# Patient Record
Sex: FEMALE | Race: WHITE | NOT HISPANIC OR LATINO | Employment: FULL TIME | URBAN - METROPOLITAN AREA
[De-identification: names, ages, dates, MRNs, and addresses within clinical notes are randomized per-mention and may not be internally consistent; named-entity substitution may affect disease eponyms.]

---

## 2020-12-15 NOTE — PROGRESS NOTES
SUBJECTIVE:   CC: Andres Trivedi is an 35 year old woman who presents for preventive health visit.       Patient has been advised of split billing requirements and indicates understanding: Yes  Healthy Habits:    Do you get at least three servings of calcium containing foods daily (dairy, green leafy vegetables, etc.)? yes    Amount of exercise or daily activities, outside of work: none    Problems taking medications regularly No    Medication side effects: No    Have you had an eye exam in the past two years? yes    Do you see a dentist twice per year? yes    Do you have sleep apnea, excessive snoring or daytime drowsiness?no      No concerns    Today's PHQ-2 Score: No flowsheet data found.    Abuse: Current or Past(Physical, Sexual or Emotional)- No  Do you feel safe in your environment? Yes        Social History     Tobacco Use     Smoking status: Never Smoker     Tobacco comment: no smokers in the household   Substance Use Topics     Alcohol use: Not on file     If you drink alcohol do you typically have >3 drinks per day or >7 drinks per week? No                     Reviewed orders with patient.  Reviewed health maintenance and updated orders accordingly - Yes  Lab work is in process    Mammogram not appropriate for this patient based on age.    Pertinent mammograms are reviewed under the imaging tab.  History of abnormal Pap smear: NO - age 30-65 PAP every 5 years with negative HPV co-testing recommended     Reviewed and updated as needed this visit by clinical staff                 Reviewed and updated as needed this visit by Provider                    ROS:  CONSTITUTIONAL: NEGATIVE for fever, chills, change in weight  INTEGUMENTARU/SKIN: NEGATIVE for worrisome rashes, moles or lesions  EYES: NEGATIVE for vision changes or irritation  ENT: NEGATIVE for ear, mouth and throat problems  RESP: NEGATIVE for significant cough or SOB  BREAST: NEGATIVE for masses, tenderness or discharge  CV: NEGATIVE for  "chest pain, palpitations or peripheral edema  GI: NEGATIVE for nausea, abdominal pain, heartburn, or change in bowel habits  : NEGATIVE for unusual urinary or vaginal symptoms. Periods are regular.  MUSCULOSKELETAL: NEGATIVE for significant arthralgias or myalgia  NEURO: NEGATIVE for weakness, dizziness or paresthesias  ENDOCRINE: NEGATIVE for temperature intolerance, skin/hair changes  HEME/ALLERGY/IMMUNE: NEGATIVE for bleeding problems  PSYCHIATRIC: NEGATIVE for changes in mood or affect    OBJECTIVE:   /82 (BP Location: Right arm, Patient Position: Sitting, Cuff Size: Adult Large)   Pulse 99   Ht 1.575 m (5' 2\")   Wt 87.7 kg (193 lb 4 oz)   LMP 12/03/2020 (Exact Date)   BMI 35.35 kg/m    EXAM:  GENERAL: healthy, alert and no distress  EYES: Eyes grossly normal to inspection, PERRL and conjunctivae and sclerae normal  HENT: ear canals and TM's normal, nose and mouth without ulcers or lesions  NECK: no adenopathy, no asymmetry, masses, or scars and thyroid normal to palpation  RESP: lungs clear to auscultation - no rales, rhonchi or wheezes  BREAST: normal without masses, tenderness or nipple discharge and no palpable axillary masses or adenopathy  CV: regular rate and rhythm, normal S1 S2, no S3 or S4, no murmur, click or rub, no peripheral edema and peripheral pulses strong  ABDOMEN: soft, nontender, no hepatosplenomegaly, no masses and bowel sounds normal   (female): normal female external genitalia, normal urethral meatus, vaginal mucosa pink, moist, well rugated, and normal cervixwithout masses or discharge  MS: no gross musculoskeletal defects noted, no edema  SKIN: no suspicious lesions or rashes  NEURO: Normal strength and tone, mentation intact and speech normal  PSYCH: mentation appears normal, affect normal/bright    Diagnostic Test Results:  Labs reviewed in Epic  No results found for this or any previous visit (from the past 24 hour(s)).    ASSESSMENT/PLAN:   (Z01.419) Encounter for " "gynecological examination without abnormal finding  (primary encounter diagnosis)  Comment:   Plan: HIV Antigen Antibody Combo, Hepatitis C         antibody            (Z68.35) BMI 35.0-35.9,adult  Comment:   Plan: COMPREHENSIVE WEIGHT MANAGEMENT        Weight fluctuates.  Has done medifast in the past with mixed results.   Feels that COVID has added to her issues with weight    (Z11.3) Routine screening for STI (sexually transmitted infection)  Comment:   Plan: Chlamydia trachomatis PCR, Neisseria         gonorrhoeae PCR, HIV Antigen Antibody Combo,         Hepatitis C antibody            (Z12.4) Screening for malignant neoplasm of cervix  Comment:   Plan: Pap imaged thin layer screen with HPV -         recommended age 30 - 65 years (select HPV order        below), HPV High Risk Types DNA Cervical                COUNSELING:   Reviewed preventive health counseling, as reflected in patient instructions       Regular exercise       Healthy diet/nutrition       Contraception       Family planning    Estimated body mass index is 35.35 kg/m  as calculated from the following:    Height as of this encounter: 1.575 m (5' 2\").    Weight as of this encounter: 87.7 kg (193 lb 4 oz).    Weight management plan: Patient referred to endocrine and/or weight management specialty    She reports that she has never smoked. She does not have any smokeless tobacco history on file.      Counseling Resources:  ATP IV Guidelines  Pooled Cohorts Equation Calculator  Breast Cancer Risk Calculator  BRCA-Related Cancer Risk Assessment: FHS-7 Tool  FRAX Risk Assessment  ICSI Preventive Guidelines  Dietary Guidelines for Americans, 2010  USDA's MyPlate  ASA Prophylaxis  Lung CA Screening    [facility] [place]  "

## 2020-12-18 ENCOUNTER — OFFICE VISIT (OUTPATIENT)
Dept: OBGYN | Facility: CLINIC | Age: 35
End: 2020-12-18
Payer: COMMERCIAL

## 2020-12-18 VITALS
DIASTOLIC BLOOD PRESSURE: 82 MMHG | WEIGHT: 193.25 LBS | HEART RATE: 99 BPM | SYSTOLIC BLOOD PRESSURE: 129 MMHG | HEIGHT: 62 IN | BODY MASS INDEX: 35.56 KG/M2

## 2020-12-18 DIAGNOSIS — Z12.4 SCREENING FOR MALIGNANT NEOPLASM OF CERVIX: ICD-10-CM

## 2020-12-18 DIAGNOSIS — Z11.3 ROUTINE SCREENING FOR STI (SEXUALLY TRANSMITTED INFECTION): ICD-10-CM

## 2020-12-18 DIAGNOSIS — Z01.419 ENCOUNTER FOR GYNECOLOGICAL EXAMINATION WITHOUT ABNORMAL FINDING: Primary | ICD-10-CM

## 2020-12-18 PROCEDURE — 86803 HEPATITIS C AB TEST: CPT | Performed by: ADVANCED PRACTICE MIDWIFE

## 2020-12-18 PROCEDURE — 87389 HIV-1 AG W/HIV-1&-2 AB AG IA: CPT | Performed by: ADVANCED PRACTICE MIDWIFE

## 2020-12-18 PROCEDURE — 87624 HPV HI-RISK TYP POOLED RSLT: CPT | Performed by: ADVANCED PRACTICE MIDWIFE

## 2020-12-18 PROCEDURE — 87491 CHLMYD TRACH DNA AMP PROBE: CPT | Performed by: ADVANCED PRACTICE MIDWIFE

## 2020-12-18 PROCEDURE — 99385 PREV VISIT NEW AGE 18-39: CPT | Performed by: ADVANCED PRACTICE MIDWIFE

## 2020-12-18 PROCEDURE — G0145 SCR C/V CYTO,THINLAYER,RESCR: HCPCS | Performed by: ADVANCED PRACTICE MIDWIFE

## 2020-12-18 PROCEDURE — 36415 COLL VENOUS BLD VENIPUNCTURE: CPT | Performed by: ADVANCED PRACTICE MIDWIFE

## 2020-12-18 PROCEDURE — 87591 N.GONORRHOEAE DNA AMP PROB: CPT | Performed by: ADVANCED PRACTICE MIDWIFE

## 2020-12-18 ASSESSMENT — MIFFLIN-ST. JEOR: SCORE: 1534.83

## 2020-12-20 LAB
C TRACH DNA SPEC QL NAA+PROBE: NEGATIVE
HCV AB SERPL QL IA: NONREACTIVE
HIV 1+2 AB+HIV1 P24 AG SERPL QL IA: NONREACTIVE
N GONORRHOEA DNA SPEC QL NAA+PROBE: NEGATIVE
SPECIMEN SOURCE: NORMAL
SPECIMEN SOURCE: NORMAL

## 2020-12-23 LAB
COPATH REPORT: NORMAL
PAP: NORMAL

## 2020-12-24 LAB
FINAL DIAGNOSIS: NORMAL
HPV HR 12 DNA CVX QL NAA+PROBE: NEGATIVE
HPV16 DNA SPEC QL NAA+PROBE: NEGATIVE
HPV18 DNA SPEC QL NAA+PROBE: NEGATIVE
SPECIMEN DESCRIPTION: NORMAL
SPECIMEN SOURCE CVX/VAG CYTO: NORMAL

## 2021-01-15 ENCOUNTER — HEALTH MAINTENANCE LETTER (OUTPATIENT)
Age: 36
End: 2021-01-15

## 2021-04-14 ENCOUNTER — OFFICE VISIT (OUTPATIENT)
Dept: DERMATOLOGY | Facility: CLINIC | Age: 36
End: 2021-04-14
Payer: COMMERCIAL

## 2021-04-14 DIAGNOSIS — L91.8 SKIN TAG: Primary | ICD-10-CM

## 2021-04-14 DIAGNOSIS — D18.01 CHERRY ANGIOMA: ICD-10-CM

## 2021-04-14 DIAGNOSIS — D22.9 BENIGN NEVUS OF SKIN: ICD-10-CM

## 2021-04-14 PROCEDURE — 11200 RMVL SKIN TAGS UP TO&INC 15: CPT | Performed by: PHYSICIAN ASSISTANT

## 2021-04-14 PROCEDURE — 99203 OFFICE O/P NEW LOW 30 MIN: CPT | Mod: 25 | Performed by: PHYSICIAN ASSISTANT

## 2021-04-14 NOTE — PATIENT INSTRUCTIONS
Cryotherapy    What is it?    Use of a very cold liquid, such as liquid nitrogen, to freeze and destroy abnormal skin cells that need to be removed    What should I expect?    Tenderness and redness    A small blister that might grow and fill with dark purple blood. There may be crusting.    More than one treatment may be needed if the lesions do not go away.    How do I care for the treated area?    Gently wash the area with your hands when bathing.    Use a thin layer of Vaseline to help with healing. You may use a Band-Aid.     The area should heal within 7-10 days and may leave behind a pink or lighter color.     Do not use an antibiotic or Neosporin ointment.     You may take acetaminophen (Tylenol) for pain.     Call your Doctor if you have:    Severe pain    Signs of infection (warmth, redness, cloudy yellow drainage, and or a bad smell)    Questions or concerns    Who should I call with questions?       [facility]: [phone]       [facility]: [phone]       For urgent needs outside of business hours call the [facility] at [phone]        and ask for the dermatology resident on call

## 2021-04-14 NOTE — LETTER
4/14/2021         RE: Andres Trivedi  [address] [facility] [address]        Dear Colleague,    Thank you for referring your patient, Andres Trivedi, to the [facility]. Please see a copy of my visit note below.    [facility] Dermatology Note  Encounter Date: Apr 14, 2021  Office Visit     Dermatology Problem List:  1. Inflamed skin tag -s/p cryo 4/14/21    Family history: Negative for skin cancer.  Social hx: uses tanning beds  ____________________________________________    Assessment & Plan:  # Cherry angioma(s).    - No further intervention needed.    # Multiple clinically benign nevi on the extremities.    - No further intervention needed.   - ABCD's of melanoma were reviewed with patient and handout provided.   - Sunscreen: Apply 20 minutes prior to going outdoors and reapply every two hours, when wet or sweating. We recommend using an SPF 30 or higher, and to use one that is water resistant.   - Strongly discouraged tanning bed use      # Inflamed skin tag, L neck x1  - See procedure note.    Procedures Performed:   - Cryotherapy procedure note, location(s): right shoulder. After verbal consent and discussion of risks and benefits including, but not limited to, dyspigmentation/scar, blister, and pain, 1 lesion(s) was(were) treated with 1-2 mm freeze border for 1-2 cycles with liquid nitrogen. Post cryotherapy instructions were provided.    Follow-up: prn for new or changing lesions    Staff and Scribe:     Scribe Disclosure:   I, Jing Benson, am serving as a scribe to document services personally performed by Shyanne Worthy PA-C, based on data collection and the provider's statements to me.    Provider Disclosure:   The documentation recorded by the scribe accurately reflects the services I personally performed and the decisions made by me.    All risks, benefits and alternatives were discussed with patient.  Patient is in agreement and  understands the assessment and plan.  All questions were answered.    Shyanne Worthy PA-C, MPAS  [facility] [place]: Phone: [phone], Fax: [phone]  [facility]: Phone: [phone],  Fax: [phone]  ____________________________________________    CC: Derm Problem (Spot check on left neck, left shoulder, right knee. Patient has never seen dermatology previously. No known family history of skin cancer.)    HPI:  Ms. Andres Trivedi is a(n) 36 year old female who presents today as a new patient for spot check.    Self-referred.     Today, notes concerns on the left neck, left shoulder, and right knee. The lesion on right medial leg has appeared in the last 2 years or so. Patient has been tanning.    Patient is otherwise feeling well, without additional concerns.    Labs:  NA    Physical Exam:  Vitals: There were no vitals taken for this visit.  SKIN: Focused examination of upper and lower extremities and back was performed.  - 7mm flesh colored papule with some brown pigment centrally on the left shoulder.  - On the right medial knee there is a 4 mm pinkish brown papule  - There are dome shaped bright red papules on the lower extremity.   - Multiple regular brown pigmented macules and papules are identified on the trunk and extremities.    - There is(are) skin colored pedunculated papules on the left side of the neck.   - Brothers's skin type I-II  - No other lesions of concern on areas examined.     Medications:  No current outpatient medications on file.     No current facility-administered medications for this visit.       Past Medical History:   Patient Active Problem List   Diagnosis     BMI 35.0-35.9,adult     Past Medical History:   Diagnosis Date     Depression        CC Dr. Pendleton on close of this encounter.      Again, thank you for allowing me to participate in the care of your patient.         Sincerely,        Shyanne Worthy PA-C

## 2021-04-14 NOTE — NURSING NOTE
Andres Trivedi's goals for this visit include:   Chief Complaint   Patient presents with     Derm Problem     Spot check on left neck, left shoulder, right knee. Patient has never seen dermatology previously. No known family history of skin cancer.       She requests these members of her care team be copied on today's visit information:     PCP: No Ref-Primary, Physician    Referring Provider:  No referring provider defined for this encounter.    There were no vitals taken for this visit.    Do you need any medication refills at today's visit? Roxanne Guerrero [facility]

## 2021-05-27 ENCOUNTER — OFFICE VISIT (OUTPATIENT)
Dept: OTOLARYNGOLOGY | Facility: CLINIC | Age: 36
End: 2021-05-27
Payer: COMMERCIAL

## 2021-05-27 DIAGNOSIS — J02.0 STREPTOCOCCAL SORE THROAT: Primary | ICD-10-CM

## 2021-05-27 LAB
DEPRECATED S PYO AG THROAT QL EIA: NEGATIVE
SPECIMEN SOURCE: NORMAL
SPECIMEN SOURCE: NORMAL
STREP GROUP A PCR: NOT DETECTED

## 2021-05-27 PROCEDURE — 99202 OFFICE O/P NEW SF 15 MIN: CPT | Performed by: OTOLARYNGOLOGY

## 2021-05-27 PROCEDURE — 99N1174 PR STATISTIC STREP A RAPID: Performed by: OTOLARYNGOLOGY

## 2021-05-27 PROCEDURE — 87651 STREP A DNA AMP PROBE: CPT | Performed by: INTERNAL MEDICINE

## 2021-05-27 NOTE — PROGRESS NOTES
Andres Trivedi presents today with sore throat since Monday night that has gotten better since yesterday. She has been taking ibuprofen. No fevers. Rapid COVID test yesterday. Fully vaccinated for COVID. Has some secretions in the morning in her throat. No rhinorrhea. Has malaise. Never had mono.     No PMH or PSH.     On exam: NAD, no stridor, voice strong. Has full mouth opening. Right submandibular tenderness. Full neck ROM. Has right pharyngeal fullness. This seems behind the tonsil and not isolated to just isolated to the anterior pillar. Uvula is midline.     Medical Decision Making: Pharyngitis. Will obtain step test. If positive will start antibiotics. If negative, will consider viral. Rapid COVID negative yesterday. I would like to reexamine after resolution of symptoms to ensure the pharyngeal fullness resolves. If you don't get better, seek care with one of the ENT partners in this practice or go to urgent care/ED. Rajinder Shetty MD

## 2021-05-27 NOTE — LETTER
5/27/2021         RE: Andres Trivedi  [address] [address]        Dear Colleague,    Thank you for referring your patient, Andres Trivedi, to the [facility]. Please see a copy of my visit note below.    Andres rTivedi presents today with sore throat since Monday night that has gotten better since yesterday. She has been taking ibuprofen. No fevers. Rapid COVID test yesterday. Fully vaccinated for COVID. Has some secretions in the morning in her throat. No rhinorrhea. Has malaise. Never had mono.     No PMH or PSH.     On exam: NAD, no stridor, voice strong. Has full mouth opening. Right submandibular tenderness. Full neck ROM. Has right pharyngeal fullness. This seems behind the tonsil and not isolated to just isolated to the anterior pillar. Uvula is midline.     Medical Decision Making: Pharyngitis. Will obtain step test. If positive will start antibiotics. If negative, will consider viral. Rapid COVID negative yesterday. I would like to reexamine after resolution of symptoms to ensure the pharyngeal fullness resolves. If you don't get better, seek care with one of the ENT partners in this practice or go to urgent care/ED. Rajinder Shetty MD            Again, thank you for allowing me to participate in the care of your patient.        Sincerely,        Rajinder Shetty MD

## 2021-09-25 ENCOUNTER — HEALTH MAINTENANCE LETTER (OUTPATIENT)
Age: 36
End: 2021-09-25

## 2022-03-12 ENCOUNTER — HEALTH MAINTENANCE LETTER (OUTPATIENT)
Age: 37
End: 2022-03-12

## 2022-08-16 ENCOUNTER — OFFICE VISIT (OUTPATIENT)
Dept: OBGYN | Facility: CLINIC | Age: 37
End: 2022-08-16
Payer: COMMERCIAL

## 2022-08-16 VITALS
BODY MASS INDEX: 39.91 KG/M2 | WEIGHT: 218.19 LBS | DIASTOLIC BLOOD PRESSURE: 82 MMHG | SYSTOLIC BLOOD PRESSURE: 124 MMHG | HEART RATE: 93 BPM

## 2022-08-16 DIAGNOSIS — Z30.430 ENCOUNTER FOR INSERTION OF INTRAUTERINE CONTRACEPTIVE DEVICE: Primary | ICD-10-CM

## 2022-08-16 DIAGNOSIS — Z97.5 IUD (INTRAUTERINE DEVICE) IN PLACE: ICD-10-CM

## 2022-08-16 PROCEDURE — 58300 INSERT INTRAUTERINE DEVICE: CPT | Performed by: ADVANCED PRACTICE MIDWIFE

## 2022-08-16 NOTE — PROGRESS NOTES
CC/HPI: Andres Trivedi is a 37 year old who presents to the clinic for an IUD insertion.   Patient's last menstrual period was 07/31/2022 (exact date)..  Current birth control method: condom and abstinence  Indication for insertion:   birth control    Patient has been provided with written information.  I have reviewed the risks of the IUD including pregnancy, PID, life threatening infection, perforation, expulsion, cramping, changes in bleeding and ovarian cysts. Benefits of the IUD and alternative birth control and cycle control methods have been discussed.  Patients questions have been answered.  Patient has verbalized understanding of risks and benefits and has signed the consent form.    No Known Allergies  Current Outpatient Medications   Medication Sig Dispense Refill     levonorgestrel (MIRENA) 20 MCG/DAY IUD 1 each (20 mcg) by Intrauterine route once        Past Medical History:   Diagnosis Date     Depression      Family History   Problem Relation Age of Onset     No Known Problems Mother      Hyperlipidemia Father      Hypertension Father      No Known Problems Sister      No Known Problems Maternal Grandmother      No Known Problems Maternal Grandfather      Diabetes Paternal Grandmother      Cardiovascular Paternal Grandmother      Autoimmune Disease Sister      Diabetes Paternal Grandfather      Cardiovascular Paternal Grandfather      Social History     Socioeconomic History     Marital status: Single     Spouse name: Not on file     Number of children: Not on file     Years of education: Not on file     Highest education level: Not on file   Occupational History     Not on file   Tobacco Use     Smoking status: Never Smoker     Smokeless tobacco: Never Used     Tobacco comment: no smokers in the household   Vaping Use     Vaping Use: Never used   Substance and Sexual Activity     Alcohol use: Yes     Comment: occ.     Drug use: Never     Sexual activity: Yes     Partners: Male     Birth  control/protection: Condom   Other Topics Concern     Not on file   Social History Narrative     Not on file     Social Determinants of Health     Financial Resource Strain: Not on file   Food Insecurity: Not on file   Transportation Needs: Not on file   Physical Activity: Not on file   Stress: Not on file   Social Connections: Not on file   Intimate Partner Violence: Not on file   Housing Stability: Not on file     Past Surgical History:   Procedure Laterality Date     NO HISTORY OF SURGERY           EXAM:  /82 (BP Location: Right arm, Patient Position: Sitting, Cuff Size: Adult Large)   Pulse 93   Wt 99 kg (218 lb 3 oz)   LMP 07/31/2022 (Exact Date)   BMI 39.91 kg/m    PELVIC EXAM:  Vulva: No external lesions, normal hair distribution, no adenopathy, BUS WNL  Vagina: Moist, pink, no abnormal discharge, well rugated, no lesions  Cervix: smooth, pink, no visible lesions, neg CMT   Uterus: Normal size, Anteverted , non-tender, mobile  Ovaries: No mass, non-tender, mobile  Rectal exam: deferred    IUD type: Mirena  Lot # [id number]  NDC# 36401-428-10 Mirena    EXP DATE:   9/2024        Procedure:  Uterus assessed for position and is Anteverted.  Speculum inserted.  Betadine prep of cervix done.  Tenaculum applied at  10/2  o'clock position and gentle traction was appiled to elongate the cervical canal.  Uterus sounded to 7 cm's.    IUD inserted in the usual fashion according to manufacture's instructions, without significant resistance, severe protracted pain or excessive bleeding. The tenaculum was removed with scant bleeding from the puncture sites   Strings trimmed to 3 cm's.  Patient  tolerated the procedure well without any prolonged pain or syncopy.    ASSESSMENT/ PLAN:  (Z30.430) Encounter for insertion of intrauterine contraceptive device  (primary encounter diagnosis)  Comment:   Plan: levonorgestrel (MIRENA) 20 MCG/DAY IUD,         levonorgestrel (MIRENA) 20 MCG/DAY IUD 20 mcg,         INSERTION  INTRAUTERINE DEVICE            (Z97.5) IUD (intrauterine device) in place  Comment:   Plan:         Instructions given to patient regarding checking IUD strings, returning to the clinic if pain, fever, unusual vaginal discharge or inability to check strings and/or irregular bleeding and avoiding placing anything in her vagina for the next 24 hours.  BUM of birth control indicated  recommended for 7 days  Return to the clinic in 4-6 weeks for IUD follow up   See AVS for complete instructions

## 2022-08-16 NOTE — PATIENT INSTRUCTIONS
What Mirena Users May Expect    What to watch for right after Mirena is placed  Some women may experience uterine cramps, bleeding, and/or dizziness during and right after Mirena is placed. To help minimize the cramps, you may taken ibuprofen 600 mg with food prior to and every 6 hours after your appointment if needed. These symptoms should improve over the next 24 hours.  Mild cramping may be present for a few days after your placement  As a follow up, you should visit your clinic once in the first 4 to 12 weeks after Mirena is placed to make sure it is in the right position. After that, Mirena can be checked once a year as part of your routine exam.    Please use a back-up method (abstinence or condoms) for 7 days after placement. Unless instructed differently at your appointment    Your periods may change  For the first 3 to 6 months, your monthly period may become irregular. You may also have frequent spotting or light bleeding. A few women have heavy bleeding during this time. After your body adjusts, the number of bleeding days is likely to decrease (but may remain irregular), and you may even find that your periods stop altogether for as long as Mirena is in place. Around the end of the third month of use, you may see up to a 75% reduction in the amount of menstrual bleeding. By one year, about 1 out of 5 users may hay have no period at all. At the end of two years, 70% have little or no bleeding. Your periods will return rapidly once Mirena is removed.     Mirena Strings  You may check your own Mirena strings by inserting a finger into the vagina and feeling the strings as they exit the cervix.  The strings will initially feel firm, like fishing line, but will soften over a few weeks.  After the strings have softened, you or your partner should not be able to feel the strings during intercourse. If your partner continues to feel the strings you can have them shortened by your provider If you can feel the  IUD, see your healthcare provider to have the position confirmed.  You may use tampons with Mirena in place.    Mirena does not protect against HIV or STDs.  Mirena does not prevent the formation of ovarian cysts.  Mirena does not typically reduce acne or cause weight gain or mood changes.    Call the clinic immediately if you:  Notice any change in the length of the strings or can feel part of the IUD  Have pain or bleeding with sex.  Have unusually heavy bleeding from the vagina.   Think you are pregnant  Have been or might have been exposed to a sexually transmitted infection  Have unusual pelvic pain, cramping or soreness in your abdomen  Have unexplained fever or chills.       Please call [facility] [phone]  if you have questions or concerns.    For more information:  [email or web address]

## 2022-12-06 ENCOUNTER — E-VISIT (OUTPATIENT)
Dept: URGENT CARE | Facility: CLINIC | Age: 37
End: 2022-12-06
Payer: COMMERCIAL

## 2022-12-06 DIAGNOSIS — J06.9 VIRAL URI WITH COUGH: Primary | ICD-10-CM

## 2022-12-06 PROCEDURE — 99421 OL DIG E/M SVC 5-10 MIN: CPT | Performed by: NURSE PRACTITIONER

## 2022-12-07 ENCOUNTER — LAB (OUTPATIENT)
Dept: LAB | Facility: CLINIC | Age: 37
End: 2022-12-07
Payer: COMMERCIAL

## 2022-12-07 DIAGNOSIS — J06.9 VIRAL URI WITH COUGH: ICD-10-CM

## 2022-12-07 LAB
FLUAV AG SPEC QL IA: POSITIVE
FLUBV AG SPEC QL IA: NEGATIVE

## 2022-12-07 PROCEDURE — 87804 INFLUENZA ASSAY W/OPTIC: CPT

## 2022-12-07 NOTE — PATIENT INSTRUCTIONS
Dear Andres,      Based on your responses, you may have influenza.     Will I be tested for COVID-19?  We would like to test you for influenza.     To schedule: go to your [employer] home page and scroll down to the section that says  You have an appointment that needs to be scheduled  and click the large green button that says  Schedule Now  and follow the steps to find the next available openings.    If you are unable to complete these [employer] scheduling steps, please call [phone] to schedule your testing.     How can I take care of myself?  Over the counter medications may help with your symptoms such as runny or stuffy nose, cough, chills, or fever.  Talk to your care team about your options.

## 2023-02-20 ENCOUNTER — TELEPHONE (OUTPATIENT)
Dept: FAMILY MEDICINE | Facility: CLINIC | Age: 38
End: 2023-02-20
Payer: COMMERCIAL

## 2023-02-20 NOTE — TELEPHONE ENCOUNTER
Reason for Call:  appointment    Detailed comments: patient has an appointment this thursday to discuss food allergies and family history of diabetes - she is worried about the weather and wondering if she should change that to virtual or just reschedule for another day    Routing to provider to review and advise     [facility]       Call taken on 2/20/2023 at 3:11 PM by Reba Pedraza

## 2023-02-23 ENCOUNTER — VIRTUAL VISIT (OUTPATIENT)
Dept: FAMILY MEDICINE | Facility: CLINIC | Age: 38
End: 2023-02-23
Payer: COMMERCIAL

## 2023-02-23 DIAGNOSIS — Z13.220 LIPID SCREENING: ICD-10-CM

## 2023-02-23 DIAGNOSIS — Z13.21 ENCOUNTER FOR VITAMIN DEFICIENCY SCREENING: ICD-10-CM

## 2023-02-23 DIAGNOSIS — Z91.018 FOOD ALLERGY: Primary | ICD-10-CM

## 2023-02-23 DIAGNOSIS — Z13.1 DIABETES MELLITUS SCREENING: ICD-10-CM

## 2023-02-23 DIAGNOSIS — R68.2 DRY MOUTH: ICD-10-CM

## 2023-02-23 PROCEDURE — 99203 OFFICE O/P NEW LOW 30 MIN: CPT | Mod: VID | Performed by: INTERNAL MEDICINE

## 2023-02-23 NOTE — PROGRESS NOTES
Andres is a 37 year old who is being evaluated via a billable video visit.      How would you like to obtain your AVS? MyChart  If the video visit is dropped, the invitation should be resent by: Send to e-mail at: [email or web address]  Will anyone else be joining your video visit? No        Assessment & Plan   Andres was seen today for allergies.    Diagnoses and all orders for this visit:    Food allergy  -     Allergen shrimp IgE; Future  -     Allergen crab IgE; Future  -     Allergen salmon IgE; Future  -     Allergen tuna IgE; Future    Dry mouth  -     Anti Nuclear Mansi IgG by IFA with Reflex; Future  -     SSA Ro DAIMON Antibody IgG; Future  -     SSB La DAMION Antibody IgG; Future  -     CBC with platelets; Future  -     Basic metabolic panel  (Ca, Cl, CO2, Creat, Gluc, K, Na, BUN); Future  -     TSH with free T4 reflex; Future    Lipid screening  -     Lipid panel reflex to direct LDL Fasting; Future    Diabetes mellitus screening  -     Glucose; Future    Encounter for vitamin deficiency screening  -     Vitamin D Deficiency; Future    Other orders  -     REVIEW OF HEALTH MAINTENANCE PROTOCOL ORDERS       Rule out food allergy, rule out Sjogren's.  If laboratory testings unremarkable, refer to ENT for further evaluation.    Return if symptoms worsen or fail to improve.    Rosaura Ambrocio MD PhD  [facility]   Andres is a 37 year old, presenting for the following health issues:  Allergies      History of Present Illness       Reason for visit:  Dry mouth concerns / eatablish PCP  Symptom onset:  More than a month  Symptoms include:  Mouth/Lips/Gums feel dry, tongue feels dry/ like it was burnt on something hot  Symptom intensity:  Moderate  Symptom progression:  Staying the same  Had these symptoms before:  No  What makes it worse:  It flucuates, unsure why.  What makes it better:  Sleeping w/ humidifier helps my lips not feel as dry.    She eats 2-3 servings of fruits and  vegetables daily.She consumes 0 sweetened beverage(s) daily.She exercises with enough effort to increase her heart rate 9 or less minutes per day.  She exercises with enough effort to increase her heart rate 3 or less days per week.   She is taking medications regularly.     At first she thought it was food allergies. It came on suddenly after eating sushi (soft shell crab, tuna, salmon, may be shrimp tempura, other raw fish, not sure the name), noted her tongue tingly, the next day, everything she eat made her tongue felt burning. She avoided seafood and nuts. Then the symptoms persisted, lipds feeling lip and dry. The tongue tingling, feels like she burnt it almost, the front half of the tongue but not the back. No difficulty swallowing.    Skin not ore dry than normal. No dry eyes.    Sister with hives outbreaks, no cause found.     No family history of autoimmune disease. Mom and maternal grandmother developed allergy to raw shrimp later in life, able to eat cooked shrimp.     Otherwise healthy, has an IUD, placed in august 2022.     Review of Systems   Constitutional, HEENT, cardiovascular, pulmonary, gi and gu systems are negative, except as otherwise noted.      Objective           Vitals:  No vitals were obtained today due to virtual visit.    Physical Exam   GENERAL: Healthy, alert and no distress  EYES: Eyes grossly normal to inspection.  No discharge or erythema, or obvious scleral/conjunctival abnormalities.  RESP: No audible wheeze, cough, or visible cyanosis.  No visible retractions or increased work of breathing.    SKIN: Visible skin clear. No significant rash, abnormal pigmentation or lesions.  NEURO: Cranial nerves grossly intact.  Mentation and speech appropriate for age.  PSYCH: Mentation appears normal, affect normal/bright, judgement and insight intact, normal speech and appearance well-groomed.        Video-Visit Details    Type of service:  Video Visit     Originating Location (pt. Location):  Home  Distant Location (provider location):  On-site  Platform used for Video Visit: Laney

## 2023-02-24 ENCOUNTER — LAB (OUTPATIENT)
Dept: LAB | Facility: CLINIC | Age: 38
End: 2023-02-24
Payer: COMMERCIAL

## 2023-02-24 DIAGNOSIS — Z91.018 FOOD ALLERGY: ICD-10-CM

## 2023-02-24 DIAGNOSIS — R68.2 DRY MOUTH: ICD-10-CM

## 2023-02-24 DIAGNOSIS — Z13.21 ENCOUNTER FOR VITAMIN DEFICIENCY SCREENING: ICD-10-CM

## 2023-02-24 DIAGNOSIS — Z13.220 LIPID SCREENING: ICD-10-CM

## 2023-02-24 DIAGNOSIS — Z13.1 DIABETES MELLITUS SCREENING: ICD-10-CM

## 2023-02-24 LAB
ANION GAP SERPL CALCULATED.3IONS-SCNC: 5 MMOL/L (ref 3–14)
BUN SERPL-MCNC: 10 MG/DL (ref 7–30)
CALCIUM SERPL-MCNC: 8.6 MG/DL (ref 8.5–10.1)
CHLORIDE BLD-SCNC: 110 MMOL/L (ref 94–109)
CHOLEST SERPL-MCNC: 180 MG/DL
CO2 SERPL-SCNC: 26 MMOL/L (ref 20–32)
CREAT SERPL-MCNC: 0.66 MG/DL (ref 0.52–1.04)
DEPRECATED CALCIDIOL+CALCIFEROL SERPL-MC: 28 UG/L (ref 20–75)
ERYTHROCYTE [DISTWIDTH] IN BLOOD BY AUTOMATED COUNT: 13 % (ref 10–15)
FASTING STATUS PATIENT QL REPORTED: YES
FASTING STATUS PATIENT QL REPORTED: YES
GFR SERPL CREATININE-BSD FRML MDRD: >90 ML/MIN/1.73M2
GLUCOSE BLD-MCNC: 92 MG/DL (ref 70–99)
GLUCOSE BLD-MCNC: 92 MG/DL (ref 70–99)
HCT VFR BLD AUTO: 40.3 % (ref 35–47)
HDLC SERPL-MCNC: 77 MG/DL
HGB BLD-MCNC: 13.1 G/DL (ref 11.7–15.7)
LDLC SERPL CALC-MCNC: 91 MG/DL
MCH RBC QN AUTO: 28.5 PG (ref 26.5–33)
MCHC RBC AUTO-ENTMCNC: 32.5 G/DL (ref 31.5–36.5)
MCV RBC AUTO: 88 FL (ref 78–100)
NONHDLC SERPL-MCNC: 103 MG/DL
PLATELET # BLD AUTO: 283 10E3/UL (ref 150–450)
POTASSIUM BLD-SCNC: 4.2 MMOL/L (ref 3.4–5.3)
RBC # BLD AUTO: 4.59 10E6/UL (ref 3.8–5.2)
SODIUM SERPL-SCNC: 141 MMOL/L (ref 133–144)
TRIGL SERPL-MCNC: 60 MG/DL
TSH SERPL DL<=0.005 MIU/L-ACNC: 1.03 MU/L (ref 0.4–4)
WBC # BLD AUTO: 8.7 10E3/UL (ref 4–11)

## 2023-02-24 PROCEDURE — 86003 ALLG SPEC IGE CRUDE XTRC EA: CPT

## 2023-02-24 PROCEDURE — 82306 VITAMIN D 25 HYDROXY: CPT

## 2023-02-24 PROCEDURE — 85027 COMPLETE CBC AUTOMATED: CPT

## 2023-02-24 PROCEDURE — 84443 ASSAY THYROID STIM HORMONE: CPT

## 2023-02-24 PROCEDURE — 80048 BASIC METABOLIC PNL TOTAL CA: CPT

## 2023-02-24 PROCEDURE — 86038 ANTINUCLEAR ANTIBODIES: CPT

## 2023-02-24 PROCEDURE — 86235 NUCLEAR ANTIGEN ANTIBODY: CPT | Mod: 59

## 2023-02-24 PROCEDURE — 36415 COLL VENOUS BLD VENIPUNCTURE: CPT

## 2023-02-24 PROCEDURE — 86235 NUCLEAR ANTIGEN ANTIBODY: CPT

## 2023-02-24 PROCEDURE — 80061 LIPID PANEL: CPT

## 2023-02-24 NOTE — RESULT ENCOUNTER NOTE
Dear Andres,   Your recent test results showed the following:  --Your labs were all normal.  Borderline chloride is not clinically significant.  --Food allergy labs and Sjogren labs are not back yet.    Please call or Mychart to our office if you have further questions.     Rosaura Ambrocio MD-PhD

## 2023-02-25 NOTE — RESULT ENCOUNTER NOTE
Dear Andres,   Your recent test results showed the following:  -- Vitamin D level is in the lower end of normal.  You can take an additional vitamin D supplement 1000 units daily to bring it into a mid to high normal range.    Please call or Mychart to our office if you have further questions.     Rosaura Ambrocio MD-PhD

## 2023-02-27 LAB
ANA SER QL IF: NEGATIVE
CRAB IGE QN: <0.1 KU(A)/L
ENA SS-A AB SER IA-ACNC: <0.5 U/ML
ENA SS-A AB SER IA-ACNC: NEGATIVE
ENA SS-B IGG SER IA-ACNC: <0.6 U/ML
ENA SS-B IGG SER IA-ACNC: NEGATIVE
SALMON IGE QN: <0.1 KU(A)/L
SHRIMP IGE QN: <0.1 KU(A)/L
TUNA IGE QN: <0.1 KU(A)/L

## 2023-03-01 NOTE — RESULT ENCOUNTER NOTE
Dear Andres,   Your recent test results showed the following:  -- no seafood or fish allergy identified.  -- Sjogren' tests were negative as well. The next step for the mouth symptoms, is to see ENT.   --  I made a referral. You can call [phone] to schedule at [facility].      Please call or Mychart to our office if you have further questions.     Rosaura Ambrocio MD-PhD

## 2023-03-17 ENCOUNTER — OFFICE VISIT (OUTPATIENT)
Dept: OTOLARYNGOLOGY | Facility: CLINIC | Age: 38
End: 2023-03-17
Payer: COMMERCIAL

## 2023-03-17 VITALS — SYSTOLIC BLOOD PRESSURE: 132 MMHG | HEART RATE: 112 BPM | DIASTOLIC BLOOD PRESSURE: 85 MMHG

## 2023-03-17 DIAGNOSIS — R43.2 TASTE IMPAIRMENT: Primary | ICD-10-CM

## 2023-03-17 PROCEDURE — 99214 OFFICE O/P EST MOD 30 MIN: CPT | Performed by: OTOLARYNGOLOGY

## 2023-03-17 ASSESSMENT — ENCOUNTER SYMPTOMS
BRUISES/BLEEDS EASILY: 0
TREMORS: 0
HEADACHES: 0
COUGH: 0
SPUTUM PRODUCTION: 0
DOUBLE VISION: 0
VOMITING: 0
HEMOPTYSIS: 0
PHOTOPHOBIA: 0
HEARTBURN: 0
TINGLING: 0
CONSTITUTIONAL NEGATIVE: 1
NAUSEA: 0
DIZZINESS: 0
BLURRED VISION: 0

## 2023-03-17 NOTE — PROGRESS NOTES
HPI     Andres is having dry mouth and taste changes for the past a couple of months. Denies any smell changes. She recently changed her toothpaste and helped a little. Denies any new medications, ear or nasal infections, trauma, food allergies, or any autoimmune diseases. No hx of thyroid diseases, diabetes, or anemia.    Review of Systems   Constitutional: Negative.    HENT: Negative.    Eyes: Negative for blurred vision, double vision and photophobia.   Respiratory: Negative for cough, hemoptysis and sputum production.    Gastrointestinal: Negative for heartburn, nausea and vomiting.   Skin: Negative.    Neurological: Negative for dizziness, tingling, tremors and headaches.   Endo/Heme/Allergies: Negative for environmental allergies. Does not bruise/bleed easily.         Physical Exam  Vitals and nursing note reviewed.   Constitutional:       Appearance: Normal appearance.   HENT:      Head: Normocephalic and atraumatic.      Right Ear: Hearing, tympanic membrane, ear canal and external ear normal. No decreased hearing noted. No middle ear effusion.      Left Ear: Tympanic membrane, ear canal and external ear normal. No decreased hearing noted.  No middle ear effusion.      Nose: Nose normal. No septal deviation, mucosal edema, congestion or rhinorrhea.      Right Turbinates: Not enlarged or swollen.      Left Turbinates: Not enlarged or swollen.      Mouth/Throat:      Mouth: Mucous membranes are dry.      Tongue: No lesions. Tongue does not deviate from midline.      Palate: No mass and lesions.      Pharynx: Oropharynx is clear. Uvula midline.   Eyes:      Extraocular Movements: Extraocular movements intact.      Pupils: Pupils are equal, round, and reactive to light.   Neurological:      Mental Status: She is alert.       A/P  Andres is having burning mouth syndrome and taste changes. Options were discussed. She will have good hydration, B12, trace elements including Mg, Zinc and be seen in the f/u as  needed.

## 2023-03-17 NOTE — NURSING NOTE
Andres Trivedi's chief complaint for this visit includes:  Chief Complaint   Patient presents with     Consult     Dry mouth and no taste on one spot on tongue. Onset in Jan, started with a burning and tingling, then dry mouth and no taste. Changed toothpaste and helped a little.      PCP: No Ref-Primary, Physician    Referring Provider:  No referring provider defined for this encounter.    /85   Pulse 112   Data Unavailable      No Known Allergies      Do you need any medication refills at today's visit?

## 2023-03-17 NOTE — PROGRESS NOTES
Chief Complaint   Patient presents with     Consult     Dry mouth and no taste on one spot on tongue. Onset in Jan, started with a burning and tingling, then dry mouth and no taste. Changed toothpaste and helped a little.

## 2023-03-17 NOTE — LETTER
3/17/2023         RE: Andres Trivedi  [id number] [address] [facility] [address]        Dear Colleague,    Thank you for referring your patient, Andres Trivedi, to the [facility]. Please see a copy of my visit note below.    Chief Complaint   Patient presents with     Consult     Dry mouth and no taste on one spot on tongue. Onset in Jan, started with a burning and tingling, then dry mouth and no taste. Changed toothpaste and helped a little.          HPI     Andres is having dry mouth and taste changes for the past a couple of months. Denies any smell changes. She recently changed her toothpaste and helped a little. Denies any new medications, ear or nasal infections, trauma, food allergies, or any autoimmune diseases. No hx of thyroid diseases, diabetes, or anemia.    Review of Systems   Constitutional: Negative.    HENT: Negative.    Eyes: Negative for blurred vision, double vision and photophobia.   Respiratory: Negative for cough, hemoptysis and sputum production.    Gastrointestinal: Negative for heartburn, nausea and vomiting.   Skin: Negative.    Neurological: Negative for dizziness, tingling, tremors and headaches.   Endo/Heme/Allergies: Negative for environmental allergies. Does not bruise/bleed easily.         Physical Exam  Vitals and nursing note reviewed.   Constitutional:       Appearance: Normal appearance.   HENT:      Head: Normocephalic and atraumatic.      Right Ear: Hearing, tympanic membrane, ear canal and external ear normal. No decreased hearing noted. No middle ear effusion.      Left Ear: Tympanic membrane, ear canal and external ear normal. No decreased hearing noted.  No middle ear effusion.      Nose: Nose normal. No septal deviation, mucosal edema, congestion or rhinorrhea.      Right Turbinates: Not enlarged or swollen.      Left Turbinates: Not enlarged or swollen.      Mouth/Throat:      Mouth: Mucous membranes are dry.      Tongue: No lesions.  Tongue does not deviate from midline.      Palate: No mass and lesions.      Pharynx: Oropharynx is clear. Uvula midline.   Eyes:      Extraocular Movements: Extraocular movements intact.      Pupils: Pupils are equal, round, and reactive to light.   Neurological:      Mental Status: She is alert.       A/P  Andres is having burning mouth syndrome and taste changes. Options were discussed. She will have good hydration, B12, trace elements including Mg, Zinc and be seen in the f/u as needed.         Again, thank you for allowing me to participate in the care of your patient.        Sincerely,        Bryanna Wolf MD

## 2023-04-22 ENCOUNTER — HEALTH MAINTENANCE LETTER (OUTPATIENT)
Age: 38
End: 2023-04-22

## 2023-05-05 ENCOUNTER — MYC MEDICAL ADVICE (OUTPATIENT)
Dept: FAMILY MEDICINE | Facility: CLINIC | Age: 38
End: 2023-05-05
Payer: COMMERCIAL

## 2023-05-05 DIAGNOSIS — U07.1 INFECTION DUE TO 2019 NOVEL CORONAVIRUS: Primary | ICD-10-CM

## 2023-12-27 ASSESSMENT — ENCOUNTER SYMPTOMS
MYALGIAS: 1
ABDOMINAL PAIN: 0
SHORTNESS OF BREATH: 0
DIARRHEA: 0
CHILLS: 0
JOINT SWELLING: 0
HEMATURIA: 0
EYE PAIN: 0
PARESTHESIAS: 0
FEVER: 0
ARTHRALGIAS: 0
SORE THROAT: 0
BREAST MASS: 0
CONSTIPATION: 0
NERVOUS/ANXIOUS: 0
WEAKNESS: 0
DYSURIA: 0
HEADACHES: 0
FREQUENCY: 0
NAUSEA: 0
COUGH: 0
PALPITATIONS: 0
HEARTBURN: 0
HEMATOCHEZIA: 0
DIZZINESS: 0

## 2023-12-28 ENCOUNTER — OFFICE VISIT (OUTPATIENT)
Dept: FAMILY MEDICINE | Facility: CLINIC | Age: 38
End: 2023-12-28
Payer: COMMERCIAL

## 2023-12-28 VITALS
HEIGHT: 62 IN | TEMPERATURE: 98.1 F | BODY MASS INDEX: 39.44 KG/M2 | RESPIRATION RATE: 17 BRPM | OXYGEN SATURATION: 97 % | DIASTOLIC BLOOD PRESSURE: 78 MMHG | SYSTOLIC BLOOD PRESSURE: 128 MMHG | WEIGHT: 214.31 LBS | HEART RATE: 90 BPM

## 2023-12-28 DIAGNOSIS — M54.9 UPPER BACK PAIN: ICD-10-CM

## 2023-12-28 DIAGNOSIS — M54.2 CERVICALGIA: ICD-10-CM

## 2023-12-28 DIAGNOSIS — Z00.00 ROUTINE GENERAL MEDICAL EXAMINATION AT A HEALTH CARE FACILITY: Primary | ICD-10-CM

## 2023-12-28 DIAGNOSIS — N64.89 BILATERAL PENDULOUS BREASTS: ICD-10-CM

## 2023-12-28 DIAGNOSIS — L08.9 SKIN INFECTION: ICD-10-CM

## 2023-12-28 PROCEDURE — 99395 PREV VISIT EST AGE 18-39: CPT | Performed by: PHYSICIAN ASSISTANT

## 2023-12-28 PROCEDURE — 99213 OFFICE O/P EST LOW 20 MIN: CPT | Mod: 25 | Performed by: PHYSICIAN ASSISTANT

## 2023-12-28 RX ORDER — PHENTERMINE HYDROCHLORIDE 15 MG/1
15 CAPSULE ORAL EVERY MORNING
Qty: 30 CAPSULE | Refills: 0 | Status: SHIPPED | OUTPATIENT
Start: 2023-12-28 | End: 2024-02-01

## 2023-12-28 ASSESSMENT — ENCOUNTER SYMPTOMS
PALPITATIONS: 0
WEAKNESS: 0
EYE PAIN: 0
SORE THROAT: 0
FREQUENCY: 0
NERVOUS/ANXIOUS: 0
MYALGIAS: 1
FEVER: 0
DIARRHEA: 0
COUGH: 0
SHORTNESS OF BREATH: 0
NAUSEA: 0
CONSTIPATION: 0
CHILLS: 0
ABDOMINAL PAIN: 0
ARTHRALGIAS: 0
HEMATOCHEZIA: 0
BREAST MASS: 0
DYSURIA: 0
HEMATURIA: 0
HEARTBURN: 0
JOINT SWELLING: 0
DIZZINESS: 0
PARESTHESIAS: 0
HEADACHES: 0

## 2023-12-28 ASSESSMENT — PAIN SCALES - GENERAL: PAINLEVEL: MILD PAIN (2)

## 2023-12-28 NOTE — PROGRESS NOTES
"   SUBJECTIVE:   Andres is a 38 year old, presenting for the following:  Physical        2023     7:52 AM   Additional Questions   Roomed by VE         2023     7:52 AM   Patient Reported Additional Medications   Patient reports taking the following new medications Vitamin D, Vitamin B12, Zince, Magnesium       Healthy Habits:     Getting at least 3 servings of Calcium per day:  Yes    Bi-annual eye exam:  NO    Dental care twice a year:  Yes    Sleep apnea or symptoms of sleep apnea:  None    Diet:  Regular (no restrictions)    Frequency of exercise:  None    Taking medications regularly:  Yes    Medication side effects:  None    Additional concerns today:  Yes    Routine Health Maintenance:  Immunizations up to date     Fasting: yes  Lipids screenin2023- normal   Diabetes screenin- normal     GYN Hx:  Pap: Last: 2020.  Repeat in 5 yr. Always normal.  Contraception: IUD-Mirena- placed 2022 Anahi Addison Day NP  Bleeding with IUD- monthly bleeding lasts 2 days, light.    Sexually active/STD screening concerns: not active. No  new partners. No STI concerns.   Denies GYN concerns of PCB, pelvic pain, dyspareunia, vaginal discharge    Back- always had upper back, shoulder and neck pain for a long time.   Breasts have always been large and feels this is a main contributor to her sx.   Has grooves in her shoulders from her bras.   38 H currently. At lowest weight 36G.  Interested in breast reduction surgery.     Weight- BMI 38- medifast in past but felt not sustainable. Calorie counting. Starting Noom past few mo - lost 5 lb. Snacking and portion sizes issue.   Gained weight past few years. Always some fluctuation. [id number]- 185-195lbs.  [id number] was down to 150 lbs. Now at 214 lb.  Exercise- not anything now. Don't enjoy it. Tried things in the past.     Cysts- prone to getting \"cysts\" between thighs or on breasts or abdomen. Deep red in color. They pop and drain and white material " "comes out. They leave a \"crater\" and scar. Can go months without them, then will have 1-2 randomly.  Not in axilla really. Not ever needed oral antibiotics. Does use bacitracin.       Have you ever done Advance Care Planning? (For example, a Health Directive, POLST, or a discussion with a medical provider or your loved ones about your wishes): Yes, patient states has an Advance Care Planning document and will bring a copy to the clinic.    Social History     Tobacco Use    Smoking status: Never     Passive exposure: Never    Smokeless tobacco: Never    Tobacco comments:     no smokers in the household   Substance Use Topics    Alcohol use: Yes     Comment: occ.             12/27/2023     9:46 AM   Alcohol Use   Prescreen: >3 drinks/day or >7 drinks/week? No       Reviewed orders with patient.  Reviewed health maintenance and updated orders accordingly - Yes  Lab work is in process  Labs reviewed in EPIC  BP Readings from Last 3 Encounters:   12/28/23 128/78   03/17/23 132/85   08/16/22 124/82    Wt Readings from Last 3 Encounters:   12/28/23 97.2 kg (214 lb 5 oz)   08/16/22 99 kg (218 lb 3 oz)   12/18/20 87.7 kg (193 lb 4 oz)                  Patient Active Problem List   Diagnosis    BMI 35.0-35.9,adult    IUD (intrauterine device) in place     Past Surgical History:   Procedure Laterality Date    NO HISTORY OF SURGERY         Social History     Tobacco Use    Smoking status: Never     Passive exposure: Never    Smokeless tobacco: Never    Tobacco comments:     no smokers in the household   Substance Use Topics    Alcohol use: Yes     Alcohol/week: 1.0 standard drink of alcohol     Types: 1 Standard drinks or equivalent per week     Comment: occ.- social. some weeks none. 1-2 socially.     Family History   Problem Relation Age of Onset    Food Allergy Mother         shrimp    Hyperlipidemia Father     Hypertension Father     Diabetes Father         Pre-Diabetic    No Known Problems Sister     Autoimmune Disease " Sister     Food Allergy Maternal Grandmother         shrimp    Dementia Maternal Grandfather     Diabetes Paternal Grandmother             Cardiovascular Paternal Grandmother     Diabetes Paternal Grandfather             Cardiovascular Paternal Grandfather          Current Outpatient Medications   Medication Sig Dispense Refill    levonorgestrel (MIRENA) 20 MCG/DAY IUD 1 each (20 mcg) by Intrauterine route once      phentermine (ADIPEX-P) 15 MG capsule Take 1 capsule (15 mg) by mouth every morning 30 capsule 0     No Known Allergies    Breast Cancer Screening:    FHS-7:       2023     9:49 AM   Breast CA Risk Assessment (FHS-7)   Did any of your first-degree relatives have breast or ovarian cancer? No   Did any of your relatives have bilateral breast cancer? Unknown   Did any man in your family have breast cancer? No   Did any woman in your family have breast and ovarian cancer? Yes   Did any woman in your family have breast cancer before age 50 y? Unknown   Do you have 2 or more relatives with breast and/or ovarian cancer? No   Do you have 2 or more relatives with breast and/or bowel cancer? No         Patient under 40 years of age: Routine Mammogram Screening not recommended.     Pertinent mammograms are reviewed under the imaging tab.    History of abnormal Pap smear: NO - age 30-65 PAP every 5 years with negative HPV co-testing recommended      Latest Ref Rng & Units 2020    11:40 AM 2020    11:22 AM   PAP / HPV   PAP (Historical)  NIL     HPV 16 DNA NEG^Negative  Negative    HPV 18 DNA NEG^Negative  Negative    Other HR HPV NEG^Negative  Negative      Reviewed and updated as needed this visit by clinical staff   Tobacco  Allergies  Meds              Reviewed and updated as needed this visit by Provider                     Review of Systems   Constitutional:  Negative for chills and fever.   HENT:  Negative for congestion, ear pain, hearing loss and sore throat.    Eyes:   "Negative for pain and visual disturbance.   Respiratory:  Negative for cough and shortness of breath.    Cardiovascular:  Negative for chest pain, palpitations and peripheral edema.   Gastrointestinal:  Negative for abdominal pain, constipation, diarrhea, heartburn, hematochezia and nausea.   Breasts:  Positive for tenderness. Negative for breast mass and discharge.   Genitourinary:  Negative for dysuria, frequency, genital sores, hematuria, pelvic pain, urgency, vaginal bleeding and vaginal discharge.   Musculoskeletal:  Positive for myalgias. Negative for arthralgias and joint swelling.   Skin:  Positive for rash.   Neurological:  Negative for dizziness, weakness, headaches and paresthesias.   Psychiatric/Behavioral:  Negative for mood changes. The patient is not nervous/anxious.           OBJECTIVE:   /78 (BP Location: Left arm, Patient Position: Chair, Cuff Size: Adult Regular)   Pulse 110   Temp 98.1  F (36.7  C) (Temporal)   Resp 17   Ht 1.581 m (5' 2.25\")   Wt 97.2 kg (214 lb 5 oz)   LMP 12/10/2023 (Approximate)   SpO2 97%   Breastfeeding No   BMI 38.88 kg/m    Physical Exam  GENERAL: healthy, alert and no distress  EYES: Eyes grossly normal to inspection, PERRL and conjunctivae and sclerae normal  HENT: ear canals and TM's normal, nose and mouth without ulcers or lesions  NECK: no adenopathy, no asymmetry, masses, or scars and thyroid normal to palpation  RESP: lungs clear to auscultation - no rales, rhonchi or wheezes  BREAST: normal without masses, tenderness or nipple discharge and no palpable axillary masses or adenopathy  CV: regular rate and rhythm, normal S1 S2, no S3 or S4, no murmur, click or rub, no peripheral edema and peripheral pulses strong  ABDOMEN: soft, nontender, no hepatosplenomegaly, no masses and bowel sounds normal   (female): normal female external genitalia, normal urethral meatus, vaginal mucosa pink, moist, well rugated, and normal cervix- IUD strings visible.   MS: " no gross musculoskeletal defects noted, no edema  SKIN: left mid-lower abdomen- single unroofed (ulcerate) lesion, no redness or drainage. Inguinale area- groin crease to vulvar area and toward buttocks hyperpigmented scars from prior skin infections, few black pores. No active cysts or pustules. no suspicious lesions or rashes  NEURO: Normal strength and tone, mentation intact and speech normal  PSYCH: mentation appears normal, affect normal/bright    Diagnostic Test Results:  Labs reviewed in Epic  No results found for any visits on 12/28/23.    ASSESSMENT/PLAN:       ICD-10-CM    1. Routine general medical examination at a health care facility  Z00.00       2. Skin infection  L08.9 Adult Dermatology  Referral      3. Bilateral pendulous breasts  N64.89       4. Cervicalgia  M54.2 Physical Therapy Referral      5. Upper back pain  M54.9 Physical Therapy Referral      6. BMI 38.0-38.9,adult  Z68.38 phentermine (ADIPEX-P) 15 MG capsule        1. Routine general medical examination at a health care facility  Reviewed VS, weight/BMI with pt   Immunizations: see discussion in HPI above or orders. Discussed some vaccines covered through the pharmacy benefit (ie Shingrix, RSV, Tdap)  Counseling see below   Health screenings/maintenance per orders below  Counseled on appropriate cancer screenings   Heart healthy exercise and eating habits discussed  If HMA for HepC or HIV screening outstanding, then discussed risk factors/indications. Ordered or discontinued/postponed   Reviewed labs pt had done 02/2023. No additional labs done today     2. Skin infection  Current lesion is healing, not infection. Area in groin/buttock region - question hidradenitis . Ref to derm.  - Adult Dermatology  Referral; Future    3. Bilateral pendulous breasts  4. Cervicalgia  5. Upper back pain  Breast size contributing to upper back and neck pain.   Ref to PT placed.   Work on wt loss efforts  She has consult with plastic  "surgery  scheduled for March 2024.  - Physical Therapy Referral; Future    6. BMI 38.0-38.9,adult  Discussed medication options, risks vs benefits, possible side effects  Her insurance plan starting Jan 2024 will only cover GLP1 meds if BMI >/= 40.   Advised dietary changes and regular exercise.   She would like to try phentermine. Recheck in 3-4 weeks in person visit.   - phentermine (ADIPEX-P) 15 MG capsule; Take 1 capsule (15 mg) by mouth every morning  Dispense: 30 capsule; Refill: 0      Patient has been advised of split billing requirements and indicates understanding: Yes      COUNSELING:  Reviewed preventive health counseling, as reflected in patient instructions       Regular exercise       Healthy diet/nutrition       Contraception      BMI:   Estimated body mass index is 38.88 kg/m  as calculated from the following:    Height as of this encounter: 1.581 m (5' 2.25\").    Weight as of this encounter: 97.2 kg (214 lb 5 oz).     Weight management plan: Discussed healthy diet and exercise guidelines discussed meds      She reports that she has never smoked. She has never been exposed to tobacco smoke. She has never used smokeless tobacco.            CRISTIAN Humphrey [facility] LUDWIG  "

## 2024-01-04 ENCOUNTER — THERAPY VISIT (OUTPATIENT)
Dept: PHYSICAL THERAPY | Facility: CLINIC | Age: 39
End: 2024-01-04
Attending: PHYSICIAN ASSISTANT
Payer: COMMERCIAL

## 2024-01-04 DIAGNOSIS — M54.2 CERVICALGIA: ICD-10-CM

## 2024-01-04 DIAGNOSIS — G89.29 CHRONIC BILATERAL THORACIC BACK PAIN: ICD-10-CM

## 2024-01-04 DIAGNOSIS — M54.9 UPPER BACK PAIN: ICD-10-CM

## 2024-01-04 DIAGNOSIS — M54.6 CHRONIC BILATERAL THORACIC BACK PAIN: ICD-10-CM

## 2024-01-04 DIAGNOSIS — M54.2 NECK PAIN: Primary | ICD-10-CM

## 2024-01-04 PROCEDURE — 97110 THERAPEUTIC EXERCISES: CPT | Mod: GP | Performed by: PHYSICAL THERAPIST

## 2024-01-04 PROCEDURE — 97161 PT EVAL LOW COMPLEX 20 MIN: CPT | Mod: GP | Performed by: PHYSICAL THERAPIST

## 2024-01-04 NOTE — PROGRESS NOTES
PHYSICAL THERAPY EVALUATION  Type of Visit: Evaluation    See electronic medical record for Abuse and Falls Screening details.    Subjective       Presenting condition or subjective complaint: Several years of near constant neck and upper back pain. It has hurt off and on for most of her adult life. No specific injuries or aggravating events.  Date of onset: 12/28/23 (date of order)    Relevant medical history: Overweight   Dates & types of surgery: None listed    Prior diagnostic imaging/testing results:       Prior therapy history for the same diagnosis, illness or injury: Yes chiropractor in the past (no major event)      Employment: Yes Optometry tech  Hobbies/Interests: cooking/baking, playing with dogs, reading    Patient goals for therapy: read, use computer, drive, daily activities without pain    Pain assessment: See objective evaluation for additional pain details     Objective   CERVICAL SPINE EVALUATION  PAIN: Pain Level at Best: 2/10  Pain Level at Worst: 5/10  Pain Location: posterior neck, upper trapezius, central thoracic spine, some upper scapular area  Pain Quality: Aching, Dull, Tender, and tight  Pain Frequency: intermittent  Pain is Exacerbated By: prolonged sitting, driving; normally worst towards the end of the day overall  Pain is Relieved By: otc medications and stretch  Pain Progression: variable/overall no different    INTEGUMENTARY (edema, incisions):   POSTURE:   GAIT:   Weightbearing Status:   Assistive Device(s):   Gait Deviations:   BALANCE/PROPRIOCEPTION:   WEIGHTBEARING ALIGNMENT:   ROM: Cervical ROM (degrees): Flexion WNL, extension 58, L rotation 66*, R rotation 70, L sidebend 40, R sidebend 40  * denotes pain       MYOTOMES: WNL  DTR S:   CORD SIGNS:   DERMATOMES:   NEURAL TENSION:   FLEXIBILITY:    SPECIAL TESTS: + Cervical flexion rotation test R  PALPATION:  B UT, L 1st rib, SCM, suboccipitals and paraspinals  SPINAL SEGMENTAL CONCLUSIONS: hypomobile C2-4, T3-6 thoracic spine  hypomobile as well      Assessment & Plan   CLINICAL IMPRESSIONS  Medical Diagnosis: Cervicalgia, upper back pain    Treatment Diagnosis: Chronic neck and thoracic pain   Impression/Assessment: Patient is a 38 year old female with neck and upper back complaints.  The following significant findings have been identified: Pain, Decreased ROM/flexibility, Decreased joint mobility, Decreased strength, and Impaired posture. These impairments interfere with their ability to perform work tasks, recreational activities, and driving  as compared to previous level of function.     Clinical Decision Making (Complexity):  Clinical Presentation: Stable/Uncomplicated  Clinical Presentation Rationale: based on medical and personal factors listed in PT evaluation  Clinical Decision Making (Complexity): Low complexity    PLAN OF CARE  Treatment Interventions:  Interventions: Manual Therapy, Neuromuscular Re-education, Therapeutic Activity, Therapeutic Exercise, Self-Care/Home Management    Long Term Goals     PT Goal 1  Goal Identifier: LTG 1  Goal Description: Patient will be able to complete 2 hours of computer work or reading without pain in neck with proper posture.  Rationale: to maximize safety and independence with performance of ADLs and functional tasks;to maximize safety and independence within the home  Target Date: 02/29/24  PT Goal 2  Goal Identifier: LTG 2  Goal Description: Patient will have no headaches over a 2 week period  Rationale: to maximize safety and independence with performance of ADLs and functional tasks;to maximize safety and independence within the home;to maximize safety and independence with self cares;to maximize safety and independence within the community  Target Date: 02/29/24      Frequency of Treatment: 2x/month  Duration of Treatment: 4 visits    Recommended Referrals to Other Professionals:  none  Education Assessment:   Learner/Method: No Barriers to  Learning;Pictures/Video;Demonstration;Reading;Listening;Patient    Risks and benefits of evaluation/treatment have been explained.   Patient/Family/caregiver agrees with Plan of Care.     Evaluation Time:     PT Eval, Low Complexity Minutes (29422): 25     Signing Clinician: Denis Champion PT

## 2024-01-18 ENCOUNTER — THERAPY VISIT (OUTPATIENT)
Dept: PHYSICAL THERAPY | Facility: CLINIC | Age: 39
End: 2024-01-18
Payer: COMMERCIAL

## 2024-01-18 DIAGNOSIS — G89.29 CHRONIC BILATERAL THORACIC BACK PAIN: ICD-10-CM

## 2024-01-18 DIAGNOSIS — M54.6 CHRONIC BILATERAL THORACIC BACK PAIN: ICD-10-CM

## 2024-01-18 DIAGNOSIS — M54.2 NECK PAIN: Primary | ICD-10-CM

## 2024-01-18 PROCEDURE — 97530 THERAPEUTIC ACTIVITIES: CPT | Mod: GP | Performed by: PHYSICAL THERAPIST

## 2024-01-18 PROCEDURE — 97110 THERAPEUTIC EXERCISES: CPT | Mod: GP | Performed by: PHYSICAL THERAPIST

## 2024-01-31 ENCOUNTER — THERAPY VISIT (OUTPATIENT)
Dept: PHYSICAL THERAPY | Facility: CLINIC | Age: 39
End: 2024-01-31
Payer: COMMERCIAL

## 2024-01-31 DIAGNOSIS — M54.6 CHRONIC BILATERAL THORACIC BACK PAIN: ICD-10-CM

## 2024-01-31 DIAGNOSIS — G89.29 CHRONIC BILATERAL THORACIC BACK PAIN: ICD-10-CM

## 2024-01-31 DIAGNOSIS — M54.2 NECK PAIN: Primary | ICD-10-CM

## 2024-01-31 PROCEDURE — 97140 MANUAL THERAPY 1/> REGIONS: CPT | Mod: GP | Performed by: PHYSICAL THERAPY ASSISTANT

## 2024-01-31 PROCEDURE — 97110 THERAPEUTIC EXERCISES: CPT | Mod: GP | Performed by: PHYSICAL THERAPY ASSISTANT

## 2024-02-01 ENCOUNTER — OFFICE VISIT (OUTPATIENT)
Dept: FAMILY MEDICINE | Facility: CLINIC | Age: 39
End: 2024-02-01
Payer: COMMERCIAL

## 2024-02-01 VITALS
HEIGHT: 62 IN | TEMPERATURE: 97.8 F | RESPIRATION RATE: 18 BRPM | BODY MASS INDEX: 38.28 KG/M2 | WEIGHT: 208 LBS | SYSTOLIC BLOOD PRESSURE: 122 MMHG | OXYGEN SATURATION: 100 % | HEART RATE: 92 BPM | DIASTOLIC BLOOD PRESSURE: 76 MMHG

## 2024-02-01 DIAGNOSIS — M54.2 CERVICALGIA: ICD-10-CM

## 2024-02-01 DIAGNOSIS — M54.9 UPPER BACK PAIN: ICD-10-CM

## 2024-02-01 DIAGNOSIS — E66.09 CLASS 2 OBESITY DUE TO EXCESS CALORIES WITH BODY MASS INDEX (BMI) OF 38.0 TO 38.9 IN ADULT, UNSPECIFIED WHETHER SERIOUS COMORBIDITY PRESENT: Primary | ICD-10-CM

## 2024-02-01 DIAGNOSIS — E66.812 CLASS 2 OBESITY DUE TO EXCESS CALORIES WITH BODY MASS INDEX (BMI) OF 38.0 TO 38.9 IN ADULT, UNSPECIFIED WHETHER SERIOUS COMORBIDITY PRESENT: Primary | ICD-10-CM

## 2024-02-01 PROCEDURE — 99213 OFFICE O/P EST LOW 20 MIN: CPT | Performed by: PHYSICIAN ASSISTANT

## 2024-02-01 RX ORDER — PHENTERMINE HYDROCHLORIDE 15 MG/1
15 CAPSULE ORAL EVERY MORNING
Qty: 30 CAPSULE | Refills: 1 | Status: SHIPPED | OUTPATIENT
Start: 2024-02-01 | End: 2024-04-18

## 2024-02-01 ASSESSMENT — PAIN SCALES - GENERAL: PAINLEVEL: MILD PAIN (2)

## 2024-02-01 NOTE — PROGRESS NOTES
Assessment & Plan     Class 2 obesity due to excess calories with body mass index (BMI) of 38.0 to 38.9 in adult, unspecified whether serious comorbidity present  Doing well with addition of phentermine to her weight loss efforts  Tolerating well without side effects  Weight is down 6lb since visit 5 weeks ago.   Refilled phentermine x 2 mo.  Will be able to see vital sign and weight for upcoming visit in March with surgeon.  Follow up visit May 2024.   Continue to increase exercise  Cont calorie controlled eating plans   - phentermine (ADIPEX-P) 15 MG capsule; Take 1 capsule (15 mg) by mouth every morning    Upper back pain  Cervicalgia  Cont with PT. Surgery consult scheduled for breast reduction      Follow Up: see above. Additionally patient was instructed to contact clinic for worsening symptoms, non-improvement in time frame discussed, and for questions regarding treatment plan.   For virtual visits, the patient was advised to be seen for in person evaluation if symptoms or condition are worsening or non-improvement as expected.       Sommer Campos is a 38 year old, presenting for the following health issues:  Recheck Medication      2/1/2024     7:52 AM   Additional Questions   Roomed by VE     History of Present Illness       Reason for visit:  Medication follow up    She eats 4 or more servings of fruits and vegetables daily.She consumes 0 sweetened beverage(s) daily.She exercises with enough effort to increase her heart rate 10 to 19 minutes per day.  She exercises with enough effort to increase her heart rate 3 or less days per week.   She is taking medications regularly.         Medication Followup of Phentermine  Taking Medication as prescribed: yes  Side Effects:  None  Medication Helping Symptoms:  yes    Weight- BMI 37.7.   Started phentermine 15mg daily. Takes right away in the morning.   I can tell I feel more full. Helps reduce snacking.   It is helping.   Denies any side effects. No CV,  "sleep side effects.   Down 6 lb on our scale here.   Calorie counting. Doing online [email or web address] tim past few mo - lost 5 lb.   Exercise- walking the dogs 3d per week- 20min.      Neck/back/shoulders- doing PT .   Last visit yesterday, added some strength training.   I still have the pain but I am gaining flexibility and ROM is better.           Review of Systems  Constitutional, HEENT, cardiovascular, pulmonary, gi and gu systems are negative, except as otherwise noted.      Objective    /76 (BP Location: Left arm, Patient Position: Chair, Cuff Size: Adult Regular)   Pulse 92   Temp 97.8  F (36.6  C) (Temporal)   Resp 18   Ht 1.581 m (5' 2.25\")   Wt 94.3 kg (208 lb)   LMP 01/14/2024 (Approximate)   SpO2 100%   Breastfeeding No   BMI 37.74 kg/m    Body mass index is 37.74 kg/m .  Physical Exam   GENERAL: alert and no distress  EYES: Eyes grossly normal to inspection, PERRL and conjunctivae and sclerae normal  HENT: ear canals and TM's normal, nose and mouth without ulcers or lesions  NECK: no adenopathy, no asymmetry, masses, or scars  RESP: lungs clear to auscultation - no rales, rhonchi or wheezes  CV: regular rate and rhythm, normal S1 S2, no S3 or S4, no murmur, click or rub, no peripheral edema  MS: Cspine and shoulders normal ROM. Mild tenderness w/palpation of super trapezius and between shoulder blades and tspine.  NEURO: Normal strength and tone, mentation intact and speech normal  PSYCH: mentation appears normal, affect normal/bright            Signed Electronically by: Paulina Phipps PA-C    "

## 2024-02-01 NOTE — PATIENT INSTRUCTIONS
I refilled the phentermine for Feb and March 2024   I can look at weight and vital signs from your consult in March with the surgeon  Then we can recheck again at a preop if you decide to proceed with surgery (or visit in May 2024)

## 2024-02-09 PROBLEM — E66.09 CLASS 2 OBESITY DUE TO EXCESS CALORIES WITH BODY MASS INDEX (BMI) OF 38.0 TO 38.9 IN ADULT, UNSPECIFIED WHETHER SERIOUS COMORBIDITY PRESENT: Status: ACTIVE | Noted: 2024-02-09

## 2024-02-09 PROBLEM — E66.812 CLASS 2 OBESITY DUE TO EXCESS CALORIES WITH BODY MASS INDEX (BMI) OF 38.0 TO 38.9 IN ADULT, UNSPECIFIED WHETHER SERIOUS COMORBIDITY PRESENT: Status: ACTIVE | Noted: 2024-02-09

## 2024-02-13 ENCOUNTER — THERAPY VISIT (OUTPATIENT)
Dept: PHYSICAL THERAPY | Facility: CLINIC | Age: 39
End: 2024-02-13
Payer: COMMERCIAL

## 2024-02-13 DIAGNOSIS — M54.6 CHRONIC BILATERAL THORACIC BACK PAIN: ICD-10-CM

## 2024-02-13 DIAGNOSIS — G89.29 CHRONIC BILATERAL THORACIC BACK PAIN: ICD-10-CM

## 2024-02-13 DIAGNOSIS — M54.2 NECK PAIN: Primary | ICD-10-CM

## 2024-02-13 PROCEDURE — 97110 THERAPEUTIC EXERCISES: CPT | Mod: GP | Performed by: PHYSICAL THERAPIST

## 2024-02-13 PROCEDURE — 97530 THERAPEUTIC ACTIVITIES: CPT | Mod: GP | Performed by: PHYSICAL THERAPIST

## 2024-03-22 ENCOUNTER — OFFICE VISIT (OUTPATIENT)
Dept: PLASTIC SURGERY | Facility: AMBULATORY SURGERY CENTER | Age: 39
End: 2024-03-22
Payer: COMMERCIAL

## 2024-03-22 VITALS
HEIGHT: 62 IN | HEART RATE: 75 BPM | BODY MASS INDEX: 36.93 KG/M2 | WEIGHT: 200.7 LBS | SYSTOLIC BLOOD PRESSURE: 118 MMHG | DIASTOLIC BLOOD PRESSURE: 70 MMHG

## 2024-03-22 DIAGNOSIS — N64.59 NIPPLE PROBLEM: ICD-10-CM

## 2024-03-22 DIAGNOSIS — N62 MACROMASTIA: Primary | ICD-10-CM

## 2024-03-22 PROCEDURE — 99203 OFFICE O/P NEW LOW 30 MIN: CPT | Performed by: PLASTIC SURGERY

## 2024-03-22 NOTE — NURSING NOTE
Patient here today for breast reduction consultation.     The patient reports shoulder, neck and back pain related to breast size. The patient also reports rashes under bilateral breast during the summer. She treats the rashes by keeping the area clean and dry.     She reports trying chiropractic care and physical therapy without relief of symptoms.      Ashley Vega RN on 3/22/2024 at 2:47 PM

## 2024-03-22 NOTE — LETTER
3/22/2024         RE: Andres Trivedi  [address] [address] [address]        Dear Colleague,    Thank you for referring your patient, Andres Trivedi, to the [facility] [place]. Please see a copy of my visit note below.    Chief complaint:  Bilateral macromastia     History of present illness:  This is a 38 year old year old who complains of symptomatic bilateral macromastia.  Essentially this patient wears a 38 H size bra and she is complaining of neck pain, upper back pain, significant shoulder grooving as well as feeling pressure on her chest secondary to the weight of her breasts upon her chest. She complains of having difficulties exercising secondary to the size of her breasts.  For the pain she has been taking ibuprofen and Tylenol. She is having difficulty sleeping as well secondary to her macromastia.  In the summertime she also presents with intertrigo at the level of bilateral inframammary folds. She treats this condition with baby powder.  She has seen both a physical therapist and a chiropractor for her neck and back pain but her symptoms persist.  Patient reports that she is having the symptoms for the last 20 years.  Patient feels that her quality of life is affected by her macromastia and she is referred to me for evaluation of possible bilateral reduction mammoplasty.     Past medical history:  Denies     Past surgical history:  Denies     Allergies:  No known drug allergies     Medications:    Current Outpatient Medications:      levonorgestrel (MIRENA) 20 MCG/DAY IUD, 1 each (20 mcg) by Intrauterine route once, Disp: , Rfl:      phentermine (ADIPEX-P) 15 MG capsule, Take 1 capsule (15 mg) by mouth every morning, Disp: 30 capsule, Rfl: 1     Family history:  Noncontributory     GYN history:  G 0, P 0     Social History:  Denies tobacco, drinks alcohol socially.     Review of systems:  General ROS: No complaints or constitutional symptoms  Skin:  "No complaints or symptoms   Hematologic/Lymphatic: No symptoms or complaints  Psychiatric: No symptoms or complaints  Endocrine: No excessive fatigue, no hypermetabolic symptoms reported  Respiratory ROS: No cough, shortness of breath, or wheezing  Cardiovascular ROS: No chest pain or dyspnea on exertion  Breast ROS: Denies nipple discharge, denies nipple inversion, denies palpable breast masses, denies changes in the color of the skin of both breasts  Gastrointestinal ROS: No abdominal pain, nausea, diarrhea, or constipation  Musculoskeletal ROS: Complains of neck and upper back pain.  Neurological ROS: No focal neurologic defects reported.       Physical exam:    /70 (BP Location: Right arm, Patient Position: Sitting)   Pulse 75   Ht 1.575 m (5' 2\")   Wt 91 kg (200 lb 11.2 oz)   LMP 01/14/2024 (Approximate)   BMI 36.71 kg/m    General: Alert, cooperative, appears stated age   Skin: Skin color, texture, turgor normal, no rashes or lesions   Lymphatic: No obvious adenopathy, no swelling   Eyes: No scleral icterus, pupils equal  HENT: No traumatic injury to the head or face, no gross abnormalities  Lungs: Normal respiratory effort, breath sounds equal bilaterally  Heart: Regular rate and rhythm  Breasts: Bilateral grade 2 ptosis. There is no nipple inversion or nipple discharge.  There is no peau d'orange.  There are no palpable breast masses.  There are no palpable axillary lymphadenopathies.  The right breast presents with sternal notch to nipple distance of 38 cm, nipple to inframammary fold 14 cm, and breast diameter 29 cm.  On the left breast sternal notch to nipple distance is 34 cm, nipple to inframammary fold is 10 cm and breast diameter is 26 cm.  Abdomen: Soft, non-distended and non-tender to palpation  Neurologic: Grossly intact                                Assessment:     38 year old years old female with symptomatic bilateral macromastia.     According to the Schnur scale based upon her body " surface area which is 1.92 which is given by her height 157 cm and her weight which is 91 kg, this patient should have at least 500 g removed per breast.         PLAN:   This patient is a good surgical candidate for bilateral reduction mammoplasty.  I will utilize Soriano pattern reduction with inferior pedicle.     Risks were explained to the patient and they include but are not limited to scarring, keloid, infection, bleeding, temporary versus permanent altered sensation of the nipple areolar complexes as well of the skin of both breasts, necrosis of the nipple areolar complex requiring potential tattooing, inability to breast-feed, need for further surgeries. Patient has acknowledged all these risks and has agreed to proceed.    Time spent with the patient and charting 30 minutes.    Avinash Johnson MD , FACS   Diplomate American Board of Plastic Surgery  Diplomate American Board of Surgery  Adj. Assistant Professor of Surgery  Division of Plastic & Reconstructive Surgery   [facility]  Office: [phone]   3/22/2024 at 3:30 PM          Again, thank you for allowing me to participate in the care of your patient.        Sincerely,        Avinash Johnson MD

## 2024-03-22 NOTE — PROGRESS NOTES
Chief complaint:  Bilateral macromastia     History of present illness:  This is a 38 year old year old who complains of symptomatic bilateral macromastia.  Essentially this patient wears a 38 H size bra and she is complaining of neck pain, upper back pain, significant shoulder grooving as well as feeling pressure on her chest secondary to the weight of her breasts upon her chest. She complains of having difficulties exercising secondary to the size of her breasts.  For the pain she has been taking ibuprofen and Tylenol. She is having difficulty sleeping as well secondary to her macromastia.  In the summertime she also presents with intertrigo at the level of bilateral inframammary folds. She treats this condition with baby powder.  She has seen both a physical therapist and a chiropractor for her neck and back pain but her symptoms persist.  Patient reports that she is having the symptoms for the last 20 years.  Patient feels that her quality of life is affected by her macromastia and she is referred to me for evaluation of possible bilateral reduction mammoplasty.     Past medical history:  Denies     Past surgical history:  Denies     Allergies:  No known drug allergies     Medications:    Current Outpatient Medications:     levonorgestrel (MIRENA) 20 MCG/DAY IUD, 1 each (20 mcg) by Intrauterine route once, Disp: , Rfl:     phentermine (ADIPEX-P) 15 MG capsule, Take 1 capsule (15 mg) by mouth every morning, Disp: 30 capsule, Rfl: 1     Family history:  Noncontributory     GYN history:  G 0, P 0     Social History:  Denies tobacco, drinks alcohol socially.     Review of systems:  General ROS: No complaints or constitutional symptoms  Skin: No complaints or symptoms   Hematologic/Lymphatic: No symptoms or complaints  Psychiatric: No symptoms or complaints  Endocrine: No excessive fatigue, no hypermetabolic symptoms reported  Respiratory ROS: No cough, shortness of breath, or wheezing  Cardiovascular ROS: No chest  "pain or dyspnea on exertion  Breast ROS: Denies nipple discharge, denies nipple inversion, denies palpable breast masses, denies changes in the color of the skin of both breasts  Gastrointestinal ROS: No abdominal pain, nausea, diarrhea, or constipation  Musculoskeletal ROS: Complains of neck and upper back pain.  Neurological ROS: No focal neurologic defects reported.       Physical exam:    /70 (BP Location: Right arm, Patient Position: Sitting)   Pulse 75   Ht 1.575 m (5' 2\")   Wt 91 kg (200 lb 11.2 oz)   LMP 01/14/2024 (Approximate)   BMI 36.71 kg/m    General: Alert, cooperative, appears stated age   Skin: Skin color, texture, turgor normal, no rashes or lesions   Lymphatic: No obvious adenopathy, no swelling   Eyes: No scleral icterus, pupils equal  HENT: No traumatic injury to the head or face, no gross abnormalities  Lungs: Normal respiratory effort, breath sounds equal bilaterally  Heart: Regular rate and rhythm  Breasts: Bilateral grade 2 ptosis. There is no nipple inversion or nipple discharge.  There is no peau d'orange.  There are no palpable breast masses.  There are no palpable axillary lymphadenopathies.  The right breast presents with sternal notch to nipple distance of 38 cm, nipple to inframammary fold 14 cm, and breast diameter 29 cm.  On the left breast sternal notch to nipple distance is 34 cm, nipple to inframammary fold is 10 cm and breast diameter is 26 cm.  Abdomen: Soft, non-distended and non-tender to palpation  Neurologic: Grossly intact                                Assessment:     38 year old years old female with symptomatic bilateral macromastia.     According to the Schnur scale based upon her body surface area which is 1.92 which is given by her height 157 cm and her weight which is 91 kg, this patient should have at least 600 g removed per breast.         PLAN:   This patient is a good surgical candidate for bilateral reduction mammoplasty.  I will utilize Soriano pattern " reduction with inferior pedicle.     Risks were explained to the patient and they include but are not limited to scarring, keloid, infection, bleeding, temporary versus permanent altered sensation of the nipple areolar complexes as well of the skin of both breasts, necrosis of the nipple areolar complex requiring potential tattooing, inability to breast-feed, need for further surgeries. Patient has acknowledged all these risks and has agreed to proceed.    Time spent with the patient and charting 30 minutes.    Avinash Johnson MD , FACS   Diplomate American Board of Plastic Surgery  Diplomate American Board of Surgery  Adj. Assistant Professor of Surgery  Division of Plastic & Reconstructive Surgery   [facility]  Office: [phone]   3/22/2024 at 3:30 PM

## 2024-03-28 ENCOUNTER — DOCUMENTATION ONLY (OUTPATIENT)
Dept: PLASTIC SURGERY | Facility: AMBULATORY SURGERY CENTER | Age: 39
End: 2024-03-28
Payer: COMMERCIAL

## 2024-03-28 ENCOUNTER — MYC MEDICAL ADVICE (OUTPATIENT)
Dept: SURGERY | Facility: CLINIC | Age: 39
End: 2024-03-28
Payer: COMMERCIAL

## 2024-03-28 NOTE — LETTER
Pre-op Physical: 5/6/2024 at 11:00 am with Paulina Phipps PA-C at the [facility]    Surgery Date: 5/13/2024     Location: [place] [address]    Approximate Arrival Time: 8:00 am  (Unless instructed differently by the pre-op call nurse)     Post op Appointment: 5/16/2024 at  3:15 pm with  Dr[facility] [address] [place] [address] [address] [address].    Pre-Surgical Tasks:     Schedule a pre-op physical with your primary care doctor if not internal to [facility].  If internal, we have scheduled this.   The pre-op physical must be 10-30 days before surgery and since it is required by anesthesia, your surgery will be cancelled if it's not done.      Review all medications with your primary care or prescribing physician; they will advise you which meds to stop and when, and when you can resume taking.  Certain medications like blood thinners and weight loss medications need to be stopped in advance of surgery to proceed safely.      Blood thinners including but not exclusive to drugs like Xarelto, Eliquis, Warfarin and Aspirin, should be stopped five days before surgery, if your prescribing provider agrees. Follow your provider's advice on stopping blood thinners because they know you best.  If you are unsure if your medication is a blood thinner, ask your prescribing provider.    Weight loss medications: There are multiple medications being used for weight management and diabetes today, and the list is growing.  Phentermine, Ozempic, Wegovy, Trulicity, and other similar medications need to be stopped one week before surgery to avoid being cancelled.  Victoza and Saxenda can be continued longer but must be stopped one full day before surgery.  Please ask your prescribing provider for advice.    Diabetic medications: in addition to the medications talked about above that are used for either weight  loss or diabetes, some people are on insulin that may require adjustment.  Please discuss managing diabetic medications with your prescribing doctor as these medications may require modification prior to surgery.     Please shower the evening before and morning of surgery with Hibiclens soap.  Any [facility] can provide this to you at no cost, or it can be found at your local pharmacy.     Fasting instructions will be provided by the pre-op nurse who will call you 1-3 days before surgery.  Typically, we advise normal food up to 8 hours before you arrive for surgery. Clear liquids only from then until 2 hours before you arrive surgery, then nothing at all by mouth.  The nurse will review your specific instructions with you at the call.      Smoking impacts your body's ability to heal properly so we advise patients to quit if possible before surgery.  Plastic Surgery patients are required to be nicotine free for at least 8 weeks before surgery.      You will need an adult to drive you home and stay with you 24 hours after surgery. Public transportation or Medical Van Services are not permitted.    Visitor restrictions are subject to change, please verify with the pre-op nurse when they call how many people are permitted to accompany you.    We always encourage you to notify your insurance any time you have medical tests or procedures scheduled including surgery. The number is usually right on the back of your insurance card. To obtain pricing for surgery, please call [facility] Cost of Care at [phone] or email [email or web address].        Call our office if you have any questions! Thank you!     Ondina Griffin MA  [facility]   (General Surgery/ ENT/ Plastics)  Direct Office: [phone]

## 2024-03-28 NOTE — PROGRESS NOTES
Surgical Prior-Auth (Pre-Scheduling)  DOS: TBD   Facility: [facility]   Insurance: [employer]   Plan Exclusion? -   Case # -   CPT: 11477 Description: Breast Reduction       Clinicals Provided:    Order: -   Visit Notes: yes   Results: -   Photos: yes   Other: -   Submitted Via: email   Date Submitted: 3/28/2024       Follow-up Phone# [phone]   : Ondina Griffin       Surgeon Name: Avinash Huggins NPI: [id number]

## 2024-04-04 NOTE — PROGRESS NOTES
Denial received due to amount of tissue to be removed.  Insurance will not approved unless it is a minimum of 600 g, provider initially stated a minimum of 500 g.    Provider did addend notes to a minimum of 600 g - updated information sent to insurance.

## 2024-04-18 ENCOUNTER — MYC REFILL (OUTPATIENT)
Dept: FAMILY MEDICINE | Facility: CLINIC | Age: 39
End: 2024-04-18
Payer: COMMERCIAL

## 2024-04-18 DIAGNOSIS — E66.09 CLASS 2 OBESITY DUE TO EXCESS CALORIES WITH BODY MASS INDEX (BMI) OF 38.0 TO 38.9 IN ADULT, UNSPECIFIED WHETHER SERIOUS COMORBIDITY PRESENT: ICD-10-CM

## 2024-04-18 DIAGNOSIS — E66.812 CLASS 2 OBESITY DUE TO EXCESS CALORIES WITH BODY MASS INDEX (BMI) OF 38.0 TO 38.9 IN ADULT, UNSPECIFIED WHETHER SERIOUS COMORBIDITY PRESENT: ICD-10-CM

## 2024-04-18 NOTE — PROGRESS NOTES
Physical Therapy Discharge Note      DISCHARGE  Reason for Discharge: Patient chooses to discontinue therapy.    Equipment Issued: none    Discharge Plan: Patient to continue home program.    Referring Provider:  Paulina Phipps

## 2024-04-23 ENCOUNTER — MYC MEDICAL ADVICE (OUTPATIENT)
Dept: FAMILY MEDICINE | Facility: CLINIC | Age: 39
End: 2024-04-23
Payer: COMMERCIAL

## 2024-04-23 RX ORDER — PHENTERMINE HYDROCHLORIDE 15 MG/1
15 CAPSULE ORAL EVERY MORNING
Qty: 30 CAPSULE | Refills: 0 | Status: SHIPPED | OUTPATIENT
Start: 2024-04-23 | End: 2024-09-26

## 2024-04-23 NOTE — TELEPHONE ENCOUNTER
Are you ok doing a follow up/med check on her phentermine at her preop scheduled with you  on 5/6/2024 or does she need a separate appt?    Ashleigh Perez CMA ([facility])

## 2024-04-26 ENCOUNTER — TELEPHONE (OUTPATIENT)
Dept: FAMILY MEDICINE | Facility: CLINIC | Age: 39
End: 2024-04-26
Payer: COMMERCIAL

## 2024-04-29 ENCOUNTER — MYC MEDICAL ADVICE (OUTPATIENT)
Dept: PLASTIC SURGERY | Facility: AMBULATORY SURGERY CENTER | Age: 39
End: 2024-04-29
Payer: COMMERCIAL

## 2024-04-30 NOTE — TELEPHONE ENCOUNTER
FMLA / NANCY or Short Term Disability Paperwork completed and signed on providers behalf.     Leave start date: 05/13/2024    Leave end date: 05/27/2024    RTW on 05/27/2024 with light duty restrictions until 06/24/2024    Forms faxed to: [employer], at: [phone].    Forms labeled and sent to HIM for scanning. Copy e-mailed to patient for personal records to [email or web address]    Ashley Vega RN on 4/30/2024 at 11:28 AM

## 2024-05-01 NOTE — TELEPHONE ENCOUNTER
[employer] called to discuss patients FMLA / LOS Paperwork. All questions answered.     Ashley Vega RN on 5/1/2024 at 12:48 PM

## 2024-05-02 ENCOUNTER — DOCUMENTATION ONLY (OUTPATIENT)
Dept: PLASTIC SURGERY | Facility: CLINIC | Age: 39
End: 2024-05-02
Payer: COMMERCIAL

## 2024-05-02 NOTE — PROGRESS NOTES
[facility] sent a fax requesting additional information for patients NACNY. All documentation was completed and provided.     Fax sent back to [facility] at [phone]    Ashley Vega RN on 5/2/2024 at 8:27 AM

## 2024-05-03 NOTE — PROGRESS NOTES
[employer] called to follow-up on paperwork that was faxed yesterday and again today.     All questions answered. No additional information was needed.     Ashley Vega RN on 5/3/2024 at 2:37 PM

## 2024-05-06 ENCOUNTER — OFFICE VISIT (OUTPATIENT)
Dept: FAMILY MEDICINE | Facility: CLINIC | Age: 39
End: 2024-05-06
Payer: COMMERCIAL

## 2024-05-06 VITALS
DIASTOLIC BLOOD PRESSURE: 80 MMHG | HEIGHT: 62 IN | TEMPERATURE: 98.4 F | BODY MASS INDEX: 36.83 KG/M2 | OXYGEN SATURATION: 100 % | HEART RATE: 102 BPM | SYSTOLIC BLOOD PRESSURE: 124 MMHG | RESPIRATION RATE: 17 BRPM | WEIGHT: 200.13 LBS

## 2024-05-06 DIAGNOSIS — E66.812 CLASS 2 OBESITY DUE TO EXCESS CALORIES WITHOUT SERIOUS COMORBIDITY WITH BODY MASS INDEX (BMI) OF 36.0 TO 36.9 IN ADULT: ICD-10-CM

## 2024-05-06 DIAGNOSIS — Z53.1 TRANSFUSION OF BLOOD PRODUCT DECLINED DUE TO RELIGIOUS REASON: ICD-10-CM

## 2024-05-06 DIAGNOSIS — Z01.818 PREOP GENERAL PHYSICAL EXAM: Primary | ICD-10-CM

## 2024-05-06 DIAGNOSIS — Z97.5 IUD (INTRAUTERINE DEVICE) IN PLACE: ICD-10-CM

## 2024-05-06 DIAGNOSIS — N62 MACROMASTIA: ICD-10-CM

## 2024-05-06 DIAGNOSIS — E66.09 CLASS 2 OBESITY DUE TO EXCESS CALORIES WITHOUT SERIOUS COMORBIDITY WITH BODY MASS INDEX (BMI) OF 36.0 TO 36.9 IN ADULT: ICD-10-CM

## 2024-05-06 PROCEDURE — 99214 OFFICE O/P EST MOD 30 MIN: CPT | Performed by: PHYSICIAN ASSISTANT

## 2024-05-06 ASSESSMENT — PAIN SCALES - GENERAL: PAINLEVEL: NO PAIN (0)

## 2024-05-06 NOTE — PROGRESS NOTES
Preoperative Evaluation  [facility] [place]  [address]  [place] [address]  Phone: [phone]  Fax: [phone]  Primary Provider: No Ref-Primary, Physician  Pre-op Performing Provider: KASH ERIC  May 6, 2024       Andres is a 39 year old, presenting for the following:  Pre-Op Exam        5/6/2024    10:44 AM   Additional Questions   Roomed by VE         5/6/2024    10:44 AM   Patient Reported Additional Medications   Patient reports taking the following new medications Vitamin B12, Vitamin D, Zinc, Magnesium     Surgical Information  Surgery/Procedure: MAMMOPLASTY, REDUCTION   Surgery Location: [facility]  Surgeon: Avinash Johnson MD  Surgery Date: 5/13/2024  Time of Surgery: TBD  Where patient plans to recover: At home with family  Fax number for surgical facility: Note does not need to be faxed, will be available electronically in Epic.    Assessment & Plan     The proposed surgical procedure is considered INTERMEDIATE risk.    Preop general physical exam  Macromastia  Pt does not have heart disease, arrhythmia history, diabetes on insulin, CKD or PVD.   Pt can exercise >4 METs , no CV sx at rest or with exertion.  Chronic conditions are stable  Surgery as scheduled.   Labs ordered below or as indicated.   Reviewed preop info in AVS with pt including NPO, showering, medication recommendations, indications to delay/schedule (ie new illness s/sx). Pt questions answered.    IUD (intrauterine device) in place  No concerns for pregnancy. IUD in place. Has monthly bleeding on schedule, has currently.     Transfusion of blood product declined due to [place] reason  Pt declines blood products. This was added to her chart for this surgery and any potential future needs.     Class 2 obesity due to excess calories without serious comorbidity with body mass index (BMI) of 36.0 to 36.9 in adult  Has been doing well with phentermine  Has lost 14 lbs  over 4 months of use. Starting BMI of 38.9 to now BMI 36.3.   She is holding phentermine for 7 days prior to surgery. She will resume post-operatively per surgeon.   Will have follow up visit in 2-3months, targeting early August for wt management.             - No identified additional risk factors other than previously addressed    Antiplatelet or Anticoagulation Medication Instructions   - Patient is on no antiplatelet or anticoagulation medications.    Additional Medication Instructions   - phentermine: HOLD 7 days prior to surgery.    Recommendation  APPROVAL GIVEN to proceed with proposed procedure, without further diagnostic evaluation.    Follow Up: see above. Additionally patient was instructed to contact clinic for worsening symptoms, non-improvement in time frame discussed, and for questions regarding treatment plan.   For virtual visits, the patient was advised to be seen for in person evaluation if symptoms or condition are worsening or non-improvement as expected.   Paulina Phipps PA-C      Subjective       HPI related to upcoming procedure: symptomatic macromastia. Breast size causing neck and upper back pain. Plans for reduction.         5/3/2024     8:50 AM   Preop Questions   1. Have you ever had a heart attack or stroke? No   2. Have you ever had surgery on your heart or blood vessels, such as a stent placement, a coronary artery bypass, or surgery on an artery in your head, neck, heart, or legs? No   3. Do you have chest pain with activity? No   4. Do you have a history of  heart failure? No   5. Do you currently have a cold, bronchitis or symptoms of other infection? No   6. Do you have a cough, shortness of breath, or wheezing? No   7. Do you or anyone in your family have previous history of blood clots? No   8. Do you or does anyone in your family have a serious bleeding problem such as prolonged bleeding following surgeries or cuts? No   9. Have you ever had problems with anemia or been  told to take iron pills? No   10. Have you had any abnormal blood loss such as black, tarry or bloody stools, or abnormal vaginal bleeding? No   11. Have you ever had a blood transfusion? No   12. Are you willing to have a blood transfusion if it is medically needed before, during, or after your surgery? NO - she declines blood transfusion.    13. Have you or any of your relatives ever had problems with anesthesia? No   14. Do you have sleep apnea, excessive snoring or daytime drowsiness? No   15. Do you have any artifical heart valves or other implanted medical devices like a pacemaker, defibrillator, or continuous glucose monitor? No   16. Do you have artificial joints? No   17. Are you allergic to latex? No   18. Is there any chance that you may be pregnant? No       Health Care Directive  Patient does not have a Health Care Directive or Living Will: Patient states has Advance Directive and will bring in a copy to clinic.    Preoperative Review of    reviewed - no record of controlled substances prescribed.    Obesity- has been on phentermine for about 4 months.  She is tolerating well. No significant side effects. No palpitations or CV sx.   Starting weight in clinic 214 lbs. She is down to 200 lbs on our scale..   Has been using [employer] tim to track calories  Exercise- walking the dog, stretches/floor exercises.     Has IUD in place.     Status of Chronic Conditions:  See problem list for active medical problems.  Problems all longstanding and stable, except as noted/documented.  See ROS for pertinent symptoms related to these conditions.    Patient Active Problem List    Diagnosis Date Noted    Class 2 obesity due to excess calories with body mass index (BMI) of 38.0 to 38.9 in adult, unspecified whether serious comorbidity present 02/09/2024     Priority: Medium    Neck pain 01/04/2024     Priority: Medium    Chronic bilateral thoracic back pain 01/04/2024     Priority: Medium    IUD (intrauterine device)  in place 2022     Priority: Medium     Mirena placed 2022        BMI 35.0-35.9,adult 2020     Priority: Medium      Past Medical History:   Diagnosis Date    Depression     Depressive disorder 2019    Was on Lexapro for 2 years. No current medications.     Past Surgical History:   Procedure Laterality Date    NO HISTORY OF SURGERY       Current Outpatient Medications   Medication Sig Dispense Refill    levonorgestrel (MIRENA) 20 MCG/DAY IUD 1 each (20 mcg) by Intrauterine route once      phentermine 15 MG capsule Take 1 capsule (15 mg) by mouth every morning Due for visit May 2024. 30 capsule 0       No Known Allergies     Social History     Tobacco Use    Smoking status: Never     Passive exposure: Never    Smokeless tobacco: Never    Tobacco comments:     no smokers in the household   Substance Use Topics    Alcohol use: Yes     Alcohol/week: 1.0 standard drink of alcohol     Types: 1 Standard drinks or equivalent per week     Comment: occ.- social. some weeks none. 1-2 socially.     Family History   Problem Relation Age of Onset    Food Allergy Mother         shrimp    Hyperlipidemia Father     Hypertension Father     Diabetes Father         Pre-Diabetic    No Known Problems Sister     Autoimmune Disease Sister     Food Allergy Maternal Grandmother         shrimp    Dementia Maternal Grandfather     Diabetes Paternal Grandmother             Cardiovascular Paternal Grandmother     Diabetes Paternal Grandfather             Cardiovascular Paternal Grandfather      History   Drug Use Unknown         Review of Systems    Review of Systems  CONSTITUTIONAL: NEGATIVE for fever, chills, change in weight  INTEGUMENTARY/SKIN: NEGATIVE for worrisome rashes, moles or lesions  EYES: NEGATIVE for vision changes or irritation  ENT/MOUTH: NEGATIVE for ear, mouth and throat problems  RESP: NEGATIVE for significant cough or SOB  BREAST: NEGATIVE for masses, tenderness or discharge  CV: NEGATIVE for  "chest pain, palpitations or peripheral edema  GI: NEGATIVE for nausea, abdominal pain, heartburn, or change in bowel habits  : NEGATIVE for frequency, dysuria, or hematuria  MUSCULOSKELETAL: NEGATIVE for significant arthralgias or myalgia  NEURO: NEGATIVE for weakness, dizziness or paresthesias  ENDOCRINE: NEGATIVE for temperature intolerance, skin/hair changes  HEME: NEGATIVE for bleeding problems  PSYCHIATRIC: NEGATIVE for changes in mood or affect    Objective    /80 (BP Location: Left arm, Patient Position: Chair, Cuff Size: Adult Regular)   Pulse 102   Temp 98.4  F (36.9  C) (Temporal)   Resp 17   Ht 1.581 m (5' 2.25\")   Wt 90.8 kg (200 lb 2 oz)   LMP  (LMP Unknown)   SpO2 100%   Breastfeeding No   BMI 36.31 kg/m     Estimated body mass index is 36.31 kg/m  as calculated from the following:    Height as of this encounter: 1.581 m (5' 2.25\").    Weight as of this encounter: 90.8 kg (200 lb 2 oz).  Physical Exam  GENERAL: alert and no distress  EYES: Eyes grossly normal to inspection, PERRL and conjunctivae and sclerae normal  HENT: ear canals and TM's normal, nose and mouth without ulcers or lesions  NECK: no adenopathy, no asymmetry, masses, or scars  RESP: lungs clear to auscultation - no rales, rhonchi or wheezes  CV: regular rate and rhythm, normal S1 S2, no S3 or S4, no murmur, click or rub, no peripheral edema  ABDOMEN: soft, nontender, no hepatosplenomegaly, no masses and bowel sounds normal  MS: no gross musculoskeletal defects noted, no edema  NEURO: Normal strength and tone, mentation intact and speech normal  PSYCH: mentation appears normal, affect normal/bright    Recent Labs   Lab Test 02/24/23  0751   HGB 13.1         POTASSIUM 4.2   CR 0.66        Diagnostics  No labs were ordered during this visit.   No EKG required, no history of coronary heart disease, significant arrhythmia, peripheral arterial disease or other structural heart disease.    Revised Cardiac Risk " Index (RCRI)  The patient has the following serious cardiovascular risks for perioperative complications:   - No serious cardiac risks = 0 points     RCRI Interpretation: 0 points: Class I (very low risk - 0.4% complication rate)         Signed Electronically by: Paulina Phipps PA-C  Copy of this evaluation report is provided to requesting physician.

## 2024-05-06 NOTE — PATIENT INSTRUCTIONS
Preparing for Your Surgery  Getting started  A nurse will call you to review your health history and instructions. They will give you an arrival time based on your scheduled surgery time. Please be ready to share:  Your doctor's clinic name and phone number  Your medical, surgical, and anesthesia history  A list of allergies and sensitivities  A list of medicines, including herbal treatments and over-the-counter drugs  Whether the patient has a legal guardian (ask how to send us the papers in advance)  Please tell us if you're pregnant--or if there's any chance you might be pregnant. Some surgeries may injure a fetus (unborn baby), so they require a pregnancy test. Surgeries that are safe for a fetus don't always need a test, and you can choose whether to have one.   If you have a child who's having surgery, please ask for a copy of Preparing for Your Child's Surgery.    Preparing for surgery  Within 10 to 30 days of surgery: Have a pre-op exam (sometimes called an H&P, or History and Physical). This can be done at a clinic or pre-operative center.  If you're having a , you may not need this exam. Talk to your care team.  At your pre-op exam, talk to your care team about all medicines you take. If you need to stop any medicines before surgery, ask when to start taking them again.  We do this for your safety. Many medicines can make you bleed too much during surgery. Some change how well surgery (anesthesia) drugs work.  Call your insurance company to let them know you're having surgery. (If you don't have insurance, call [phone].)  Call your clinic if there's any change in your health. This includes signs of a cold or flu (sore throat, runny nose, cough, rash, fever). It also includes a scrape or scratch near the surgery site.  If you have questions on the day of surgery, call your hospital or surgery center.  Eating and drinking guidelines  For your safety: Unless your surgeon tells you otherwise,  follow the guidelines below.  Eat and drink as usual until 8 hours before you arrive for surgery. After that, no food or milk.  Drink clear liquids until 2 hours before you arrive. These are liquids you can see through, like water, Gatorade, and Propel Water. They also include plain black coffee and tea (no cream or milk), candy, and breath mints. You can spit out gum when you arrive.  If you drink alcohol: Stop drinking it the night before surgery.  If your care team tells you to take medicine on the morning of surgery, it's okay to take it with a sip of water.  Preventing infection  Shower or bathe the night before and morning of your surgery. Follow the instructions your clinic gave you. (If no instructions, use regular soap.)  Don't shave or clip hair near your surgery site. We'll remove the hair if needed.  Don't smoke or vape the morning of surgery. You may chew nicotine gum up to 2 hours before surgery. A nicotine patch is okay.  Note: Some surgeries require you to completely quit smoking and nicotine. Check with your surgeon.  Your care team will make every effort to keep you safe from infection. We will:  Clean our hands often with soap and water (or an alcohol-based hand rub).  Clean the skin at your surgery site with a special soap that kills germs.  Give you a special gown to keep you warm. (Cold raises the risk of infection.)  Wear special hair covers, masks, gowns and gloves during surgery.  Give antibiotic medicine, if prescribed. Not all surgeries need antibiotics.  What to bring on the day of surgery  Photo ID and insurance card  Copy of your health care directive, if you have one  Glasses and hearing aids (bring cases)  You can't wear contacts during surgery  Inhaler and eye drops, if you use them (tell us about these when you arrive)  CPAP machine or breathing device, if you use them  A few personal items, if spending the night  If you have . . .  A pacemaker, ICD (cardiac defibrillator) or other  implant: Bring the ID card.  An implanted stimulator: Bring the remote control.  A legal guardian: Bring a copy of the certified (court-stamped) guardianship papers.  Please remove any jewelry, including body piercings. Leave jewelry and other valuables at home.  If you're going home the day of surgery  You must have a responsible adult drive you home. They should stay with you overnight as well.  If you don't have someone to stay with you, and you aren't safe to go home alone, we may keep you overnight. Insurance often won't pay for this.  After surgery  If it's hard to control your pain or you need more pain medicine, please call your surgeon's office.  Questions?   If you have any questions for your care team, list them here: _________________________________________________________________________________________________________________________________________________________________________ ____________________________________ ____________________________________ ____________________________________  For informational purposes only. Not to replace the advice of your health care provider. Copyright   2003, 2019 [employer]. All rights reserved. Clinically reviewed by Shayla Gutierrez MD. [employer] 469904 - REV 12/22.    How to Take Your Medication Before Surgery  - HOLD (do not take) the phentermine for 7 days prior to surgery  Hold supplements starting now.

## 2024-05-06 NOTE — H&P (VIEW-ONLY)
Preoperative Evaluation  [facility] [place]  [address]  [place] [address]  Phone: [phone]  Fax: [phone]  Primary Provider: No Ref-Primary, Physician  Pre-op Performing Provider: KASH ERIC  May 6, 2024       Andres is a 39 year old, presenting for the following:  Pre-Op Exam        5/6/2024    10:44 AM   Additional Questions   Roomed by VE         5/6/2024    10:44 AM   Patient Reported Additional Medications   Patient reports taking the following new medications Vitamin B12, Vitamin D, Zinc, Magnesium     Surgical Information  Surgery/Procedure: MAMMOPLASTY, REDUCTION   Surgery Location: [facility]  Surgeon: Avinash Johnson MD  Surgery Date: 5/13/2024  Time of Surgery: TBD  Where patient plans to recover: At home with family  Fax number for surgical facility: Note does not need to be faxed, will be available electronically in Epic.    Assessment & Plan     The proposed surgical procedure is considered INTERMEDIATE risk.    Preop general physical exam  Macromastia  Pt does not have heart disease, arrhythmia history, diabetes on insulin, CKD or PVD.   Pt can exercise >4 METs , no CV sx at rest or with exertion.  Chronic conditions are stable  Surgery as scheduled.   Labs ordered below or as indicated.   Reviewed preop info in AVS with pt including NPO, showering, medication recommendations, indications to delay/schedule (ie new illness s/sx). Pt questions answered.    IUD (intrauterine device) in place  No concerns for pregnancy. IUD in place. Has monthly bleeding on schedule, has currently.     Transfusion of blood product declined due to Gnosticism reason  Pt declines blood products. This was added to her chart for this surgery and any potential future needs.     Class 2 obesity due to excess calories without serious comorbidity with body mass index (BMI) of 36.0 to 36.9 in adult  Has been doing well with phentermine  Has lost 14 lbs  over 4 months of use. Starting BMI of 38.9 to now BMI 36.3.   She is holding phentermine for 7 days prior to surgery. She will resume post-operatively per surgeon.   Will have follow up visit in 2-3months, targeting early August for wt management.             - No identified additional risk factors other than previously addressed    Antiplatelet or Anticoagulation Medication Instructions   - Patient is on no antiplatelet or anticoagulation medications.    Additional Medication Instructions   - phentermine: HOLD 7 days prior to surgery.    Recommendation  APPROVAL GIVEN to proceed with proposed procedure, without further diagnostic evaluation.    Follow Up: see above. Additionally patient was instructed to contact clinic for worsening symptoms, non-improvement in time frame discussed, and for questions regarding treatment plan.   For virtual visits, the patient was advised to be seen for in person evaluation if symptoms or condition are worsening or non-improvement as expected.   Paulina Phipps PA-C      Subjective       HPI related to upcoming procedure: symptomatic macromastia. Breast size causing neck and upper back pain. Plans for reduction.         5/3/2024     8:50 AM   Preop Questions   1. Have you ever had a heart attack or stroke? No   2. Have you ever had surgery on your heart or blood vessels, such as a stent placement, a coronary artery bypass, or surgery on an artery in your head, neck, heart, or legs? No   3. Do you have chest pain with activity? No   4. Do you have a history of  heart failure? No   5. Do you currently have a cold, bronchitis or symptoms of other infection? No   6. Do you have a cough, shortness of breath, or wheezing? No   7. Do you or anyone in your family have previous history of blood clots? No   8. Do you or does anyone in your family have a serious bleeding problem such as prolonged bleeding following surgeries or cuts? No   9. Have you ever had problems with anemia or been  told to take iron pills? No   10. Have you had any abnormal blood loss such as black, tarry or bloody stools, or abnormal vaginal bleeding? No   11. Have you ever had a blood transfusion? No   12. Are you willing to have a blood transfusion if it is medically needed before, during, or after your surgery? NO - she declines blood transfusion.    13. Have you or any of your relatives ever had problems with anesthesia? No   14. Do you have sleep apnea, excessive snoring or daytime drowsiness? No   15. Do you have any artifical heart valves or other implanted medical devices like a pacemaker, defibrillator, or continuous glucose monitor? No   16. Do you have artificial joints? No   17. Are you allergic to latex? No   18. Is there any chance that you may be pregnant? No       Health Care Directive  Patient does not have a Health Care Directive or Living Will: Patient states has Advance Directive and will bring in a copy to clinic.    Preoperative Review of    reviewed - no record of controlled substances prescribed.    Obesity- has been on phentermine for about 4 months.  She is tolerating well. No significant side effects. No palpitations or CV sx.   Starting weight in clinic 214 lbs. She is down to 200 lbs on our scale..   Has been using [employer] Snap tim to track calories  Exercise- walking the dog, stretches/floor exercises.     Has IUD in place.     Status of Chronic Conditions:  See problem list for active medical problems.  Problems all longstanding and stable, except as noted/documented.  See ROS for pertinent symptoms related to these conditions.    Patient Active Problem List    Diagnosis Date Noted    Class 2 obesity due to excess calories with body mass index (BMI) of 38.0 to 38.9 in adult, unspecified whether serious comorbidity present 02/09/2024     Priority: Medium    Neck pain 01/04/2024     Priority: Medium    Chronic bilateral thoracic back pain 01/04/2024     Priority: Medium    IUD (intrauterine device)  in place 2022     Priority: Medium     Mirena placed 2022        BMI 35.0-35.9,adult 2020     Priority: Medium      Past Medical History:   Diagnosis Date    Depression     Depressive disorder 2019    Was on Lexapro for 2 years. No current medications.     Past Surgical History:   Procedure Laterality Date    NO HISTORY OF SURGERY       Current Outpatient Medications   Medication Sig Dispense Refill    levonorgestrel (MIRENA) 20 MCG/DAY IUD 1 each (20 mcg) by Intrauterine route once      phentermine 15 MG capsule Take 1 capsule (15 mg) by mouth every morning Due for visit May 2024. 30 capsule 0       No Known Allergies     Social History     Tobacco Use    Smoking status: Never     Passive exposure: Never    Smokeless tobacco: Never    Tobacco comments:     no smokers in the household   Substance Use Topics    Alcohol use: Yes     Alcohol/week: 1.0 standard drink of alcohol     Types: 1 Standard drinks or equivalent per week     Comment: occ.- social. some weeks none. 1-2 socially.     Family History   Problem Relation Age of Onset    Food Allergy Mother         shrimp    Hyperlipidemia Father     Hypertension Father     Diabetes Father         Pre-Diabetic    No Known Problems Sister     Autoimmune Disease Sister     Food Allergy Maternal Grandmother         shrimp    Dementia Maternal Grandfather     Diabetes Paternal Grandmother             Cardiovascular Paternal Grandmother     Diabetes Paternal Grandfather             Cardiovascular Paternal Grandfather      History   Drug Use Unknown         Review of Systems    Review of Systems  CONSTITUTIONAL: NEGATIVE for fever, chills, change in weight  INTEGUMENTARY/SKIN: NEGATIVE for worrisome rashes, moles or lesions  EYES: NEGATIVE for vision changes or irritation  ENT/MOUTH: NEGATIVE for ear, mouth and throat problems  RESP: NEGATIVE for significant cough or SOB  BREAST: NEGATIVE for masses, tenderness or discharge  CV: NEGATIVE for  "chest pain, palpitations or peripheral edema  GI: NEGATIVE for nausea, abdominal pain, heartburn, or change in bowel habits  : NEGATIVE for frequency, dysuria, or hematuria  MUSCULOSKELETAL: NEGATIVE for significant arthralgias or myalgia  NEURO: NEGATIVE for weakness, dizziness or paresthesias  ENDOCRINE: NEGATIVE for temperature intolerance, skin/hair changes  HEME: NEGATIVE for bleeding problems  PSYCHIATRIC: NEGATIVE for changes in mood or affect    Objective    /80 (BP Location: Left arm, Patient Position: Chair, Cuff Size: Adult Regular)   Pulse 102   Temp 98.4  F (36.9  C) (Temporal)   Resp 17   Ht 1.581 m (5' 2.25\")   Wt 90.8 kg (200 lb 2 oz)   LMP  (LMP Unknown)   SpO2 100%   Breastfeeding No   BMI 36.31 kg/m     Estimated body mass index is 36.31 kg/m  as calculated from the following:    Height as of this encounter: 1.581 m (5' 2.25\").    Weight as of this encounter: 90.8 kg (200 lb 2 oz).  Physical Exam  GENERAL: alert and no distress  EYES: Eyes grossly normal to inspection, PERRL and conjunctivae and sclerae normal  HENT: ear canals and TM's normal, nose and mouth without ulcers or lesions  NECK: no adenopathy, no asymmetry, masses, or scars  RESP: lungs clear to auscultation - no rales, rhonchi or wheezes  CV: regular rate and rhythm, normal S1 S2, no S3 or S4, no murmur, click or rub, no peripheral edema  ABDOMEN: soft, nontender, no hepatosplenomegaly, no masses and bowel sounds normal  MS: no gross musculoskeletal defects noted, no edema  NEURO: Normal strength and tone, mentation intact and speech normal  PSYCH: mentation appears normal, affect normal/bright    Recent Labs   Lab Test 02/24/23  0751   HGB 13.1         POTASSIUM 4.2   CR 0.66        Diagnostics  No labs were ordered during this visit.   No EKG required, no history of coronary heart disease, significant arrhythmia, peripheral arterial disease or other structural heart disease.    Revised Cardiac Risk " Index (RCRI)  The patient has the following serious cardiovascular risks for perioperative complications:   - No serious cardiac risks = 0 points     RCRI Interpretation: 0 points: Class I (very low risk - 0.4% complication rate)         Signed Electronically by: Paulina Phipps PA-C  Copy of this evaluation report is provided to requesting physician.

## 2024-05-13 ENCOUNTER — HOSPITAL ENCOUNTER (OUTPATIENT)
Facility: HOSPITAL | Age: 39
Setting detail: OBSERVATION
Discharge: HOME | End: 2024-05-16
Attending: PLASTIC SURGERY | Admitting: PLASTIC SURGERY
Payer: COMMERCIAL

## 2024-05-13 ENCOUNTER — ANESTHESIA (OUTPATIENT)
Dept: SURGERY | Facility: HOSPITAL | Age: 39
End: 2024-05-13
Payer: COMMERCIAL

## 2024-05-13 ENCOUNTER — ANESTHESIA EVENT (OUTPATIENT)
Dept: SURGERY | Facility: HOSPITAL | Age: 39
End: 2024-05-13
Payer: COMMERCIAL

## 2024-05-13 DIAGNOSIS — Z98.890 S/P BILATERAL BREAST REDUCTION: Primary | ICD-10-CM

## 2024-05-13 PROCEDURE — 250N000011 HC RX IP 250 OP 636: Performed by: PLASTIC SURGERY

## 2024-05-13 PROCEDURE — 999N000141 HC STATISTIC PRE-PROCEDURE NURSING ASSESSMENT: Performed by: PLASTIC SURGERY

## 2024-05-13 PROCEDURE — 19318 BREAST REDUCTION: CPT | Mod: 50 | Performed by: PLASTIC SURGERY

## 2024-05-13 PROCEDURE — 250N000011 HC RX IP 250 OP 636

## 2024-05-13 PROCEDURE — 710N000009 HC RECOVERY PHASE 1, LEVEL 1, PER MIN: Performed by: PLASTIC SURGERY

## 2024-05-13 PROCEDURE — 250N000009 HC RX 250: Performed by: ANESTHESIOLOGY

## 2024-05-13 PROCEDURE — 272N000001 HC OR GENERAL SUPPLY STERILE: Performed by: PLASTIC SURGERY

## 2024-05-13 PROCEDURE — 37799 UNLISTED PX VASCULAR SURGERY: CPT | Performed by: PLASTIC SURGERY

## 2024-05-13 PROCEDURE — 258N000003 HC RX IP 258 OP 636: Performed by: ANESTHESIOLOGY

## 2024-05-13 PROCEDURE — 250N000009 HC RX 250: Performed by: PLASTIC SURGERY

## 2024-05-13 PROCEDURE — 250N000025 HC SEVOFLURANE, PER MIN: Performed by: PLASTIC SURGERY

## 2024-05-13 PROCEDURE — 250N000011 HC RX IP 250 OP 636: Performed by: ANESTHESIOLOGY

## 2024-05-13 PROCEDURE — 88305 TISSUE EXAM BY PATHOLOGIST: CPT | Mod: 26 | Performed by: PATHOLOGY

## 2024-05-13 PROCEDURE — 360N000076 HC SURGERY LEVEL 3, PER MIN: Performed by: PLASTIC SURGERY

## 2024-05-13 PROCEDURE — 258N000003 HC RX IP 258 OP 636

## 2024-05-13 PROCEDURE — 88305 TISSUE EXAM BY PATHOLOGIST: CPT | Mod: TC | Performed by: PLASTIC SURGERY

## 2024-05-13 PROCEDURE — 370N000017 HC ANESTHESIA TECHNICAL FEE, PER MIN: Performed by: PLASTIC SURGERY

## 2024-05-13 PROCEDURE — 250N000009 HC RX 250

## 2024-05-13 PROCEDURE — 250N000013 HC RX MED GY IP 250 OP 250 PS 637: Performed by: PLASTIC SURGERY

## 2024-05-13 PROCEDURE — 258N000003 HC RX IP 258 OP 636: Performed by: PLASTIC SURGERY

## 2024-05-13 RX ORDER — AMOXICILLIN 250 MG
1 CAPSULE ORAL 2 TIMES DAILY
Status: DISCONTINUED | OUTPATIENT
Start: 2024-05-13 | End: 2024-05-16 | Stop reason: HOSPADM

## 2024-05-13 RX ORDER — SODIUM CHLORIDE, SODIUM LACTATE, POTASSIUM CHLORIDE, CALCIUM CHLORIDE 600; 310; 30; 20 MG/100ML; MG/100ML; MG/100ML; MG/100ML
INJECTION, SOLUTION INTRAVENOUS CONTINUOUS
Status: DISCONTINUED | OUTPATIENT
Start: 2024-05-13 | End: 2024-05-13 | Stop reason: HOSPADM

## 2024-05-13 RX ORDER — HYDROCODONE BITARTRATE AND ACETAMINOPHEN 5; 325 MG/1; MG/1
1-2 TABLET ORAL EVERY 4 HOURS PRN
Qty: 30 TABLET | Refills: 0 | Status: SHIPPED | OUTPATIENT
Start: 2024-05-13 | End: 2024-05-31

## 2024-05-13 RX ORDER — GINSENG 100 MG
CAPSULE ORAL PRN
Status: DISCONTINUED | OUTPATIENT
Start: 2024-05-13 | End: 2024-05-13 | Stop reason: HOSPADM

## 2024-05-13 RX ORDER — CEFAZOLIN SODIUM/WATER 2 G/20 ML
2 SYRINGE (ML) INTRAVENOUS
Status: COMPLETED | OUTPATIENT
Start: 2024-05-13 | End: 2024-05-13

## 2024-05-13 RX ORDER — ONDANSETRON 2 MG/ML
4 INJECTION INTRAMUSCULAR; INTRAVENOUS EVERY 30 MIN PRN
Status: CANCELLED | OUTPATIENT
Start: 2024-05-13

## 2024-05-13 RX ORDER — ONDANSETRON 4 MG/1
4 TABLET, ORALLY DISINTEGRATING ORAL EVERY 30 MIN PRN
Status: DISCONTINUED | OUTPATIENT
Start: 2024-05-13 | End: 2024-05-13 | Stop reason: HOSPADM

## 2024-05-13 RX ORDER — PROPOFOL 10 MG/ML
INJECTION, EMULSION INTRAVENOUS PRN
Status: DISCONTINUED | OUTPATIENT
Start: 2024-05-13 | End: 2024-05-13

## 2024-05-13 RX ORDER — PROPOFOL 10 MG/ML
INJECTION, EMULSION INTRAVENOUS CONTINUOUS PRN
Status: DISCONTINUED | OUTPATIENT
Start: 2024-05-13 | End: 2024-05-13

## 2024-05-13 RX ORDER — LIDOCAINE HYDROCHLORIDE 10 MG/ML
INJECTION, SOLUTION INFILTRATION; PERINEURAL PRN
Status: DISCONTINUED | OUTPATIENT
Start: 2024-05-13 | End: 2024-05-13

## 2024-05-13 RX ORDER — HYDROMORPHONE HCL IN WATER/PF 6 MG/30 ML
0.2 PATIENT CONTROLLED ANALGESIA SYRINGE INTRAVENOUS EVERY 5 MIN PRN
Status: DISCONTINUED | OUTPATIENT
Start: 2024-05-13 | End: 2024-05-13 | Stop reason: HOSPADM

## 2024-05-13 RX ORDER — HYDROMORPHONE HYDROCHLORIDE 4 MG/1
4 TABLET ORAL EVERY 4 HOURS PRN
Status: DISCONTINUED | OUTPATIENT
Start: 2024-05-13 | End: 2024-05-16 | Stop reason: HOSPADM

## 2024-05-13 RX ORDER — OXYCODONE HYDROCHLORIDE 5 MG/1
10 TABLET ORAL
Status: CANCELLED | OUTPATIENT
Start: 2024-05-13

## 2024-05-13 RX ORDER — CEFAZOLIN SODIUM 2 G/100ML
2 INJECTION, SOLUTION INTRAVENOUS EVERY 8 HOURS
Qty: 200 ML | Refills: 0 | Status: COMPLETED | OUTPATIENT
Start: 2024-05-14 | End: 2024-05-14

## 2024-05-13 RX ORDER — DIMENHYDRINATE 50 MG/ML
25 INJECTION, SOLUTION INTRAMUSCULAR; INTRAVENOUS
Status: CANCELLED | OUTPATIENT
Start: 2024-05-13

## 2024-05-13 RX ORDER — POLYETHYLENE GLYCOL 3350 17 G/17G
17 POWDER, FOR SOLUTION ORAL DAILY
Status: DISCONTINUED | OUTPATIENT
Start: 2024-05-14 | End: 2024-05-16 | Stop reason: HOSPADM

## 2024-05-13 RX ORDER — HALOPERIDOL 5 MG/ML
1 INJECTION INTRAMUSCULAR
Status: DISCONTINUED | OUTPATIENT
Start: 2024-05-13 | End: 2024-05-13 | Stop reason: HOSPADM

## 2024-05-13 RX ORDER — MAGNESIUM HYDROXIDE 1200 MG/15ML
LIQUID ORAL PRN
Status: DISCONTINUED | OUTPATIENT
Start: 2024-05-13 | End: 2024-05-13 | Stop reason: HOSPADM

## 2024-05-13 RX ORDER — FENTANYL CITRATE 50 UG/ML
25 INJECTION, SOLUTION INTRAMUSCULAR; INTRAVENOUS EVERY 5 MIN PRN
Status: DISCONTINUED | OUTPATIENT
Start: 2024-05-13 | End: 2024-05-13 | Stop reason: HOSPADM

## 2024-05-13 RX ORDER — ACETAMINOPHEN 325 MG/1
650 TABLET ORAL EVERY 4 HOURS PRN
Status: DISCONTINUED | OUTPATIENT
Start: 2024-05-16 | End: 2024-05-16 | Stop reason: HOSPADM

## 2024-05-13 RX ORDER — AMOXICILLIN 250 MG
1-2 CAPSULE ORAL 2 TIMES DAILY
Qty: 30 TABLET | Refills: 0 | Status: SHIPPED | OUTPATIENT
Start: 2024-05-13 | End: 2024-05-31

## 2024-05-13 RX ORDER — PROPOFOL 10 MG/ML
INJECTION, EMULSION INTRAVENOUS
Status: COMPLETED
Start: 2024-05-13 | End: 2024-05-13

## 2024-05-13 RX ORDER — LIDOCAINE 40 MG/G
CREAM TOPICAL
Status: DISCONTINUED | OUTPATIENT
Start: 2024-05-13 | End: 2024-05-16 | Stop reason: HOSPADM

## 2024-05-13 RX ORDER — ONDANSETRON 2 MG/ML
INJECTION INTRAMUSCULAR; INTRAVENOUS PRN
Status: DISCONTINUED | OUTPATIENT
Start: 2024-05-13 | End: 2024-05-13

## 2024-05-13 RX ORDER — ONDANSETRON 4 MG/1
4 TABLET, ORALLY DISINTEGRATING ORAL EVERY 6 HOURS PRN
Status: DISCONTINUED | OUTPATIENT
Start: 2024-05-13 | End: 2024-05-16 | Stop reason: HOSPADM

## 2024-05-13 RX ORDER — ONDANSETRON 2 MG/ML
4 INJECTION INTRAMUSCULAR; INTRAVENOUS EVERY 6 HOURS PRN
Status: DISCONTINUED | OUTPATIENT
Start: 2024-05-13 | End: 2024-05-16 | Stop reason: HOSPADM

## 2024-05-13 RX ORDER — ACETAMINOPHEN 325 MG/1
975 TABLET ORAL EVERY 8 HOURS
Status: COMPLETED | OUTPATIENT
Start: 2024-05-13 | End: 2024-05-16

## 2024-05-13 RX ORDER — HYDROMORPHONE HCL IN WATER/PF 6 MG/30 ML
0.2 PATIENT CONTROLLED ANALGESIA SYRINGE INTRAVENOUS
Status: DISCONTINUED | OUTPATIENT
Start: 2024-05-13 | End: 2024-05-16 | Stop reason: HOSPADM

## 2024-05-13 RX ORDER — HYDROMORPHONE HCL IN WATER/PF 6 MG/30 ML
0.4 PATIENT CONTROLLED ANALGESIA SYRINGE INTRAVENOUS
Status: DISCONTINUED | OUTPATIENT
Start: 2024-05-13 | End: 2024-05-16 | Stop reason: HOSPADM

## 2024-05-13 RX ORDER — NALOXONE HYDROCHLORIDE 0.4 MG/ML
0.2 INJECTION, SOLUTION INTRAMUSCULAR; INTRAVENOUS; SUBCUTANEOUS
Status: DISCONTINUED | OUTPATIENT
Start: 2024-05-13 | End: 2024-05-16 | Stop reason: HOSPADM

## 2024-05-13 RX ORDER — FENTANYL CITRATE 50 UG/ML
25 INJECTION, SOLUTION INTRAMUSCULAR; INTRAVENOUS
Status: CANCELLED | OUTPATIENT
Start: 2024-05-13

## 2024-05-13 RX ORDER — PROCHLORPERAZINE MALEATE 10 MG
10 TABLET ORAL EVERY 6 HOURS PRN
Status: DISCONTINUED | OUTPATIENT
Start: 2024-05-13 | End: 2024-05-16 | Stop reason: HOSPADM

## 2024-05-13 RX ORDER — HYDROMORPHONE HCL IN WATER/PF 6 MG/30 ML
0.4 PATIENT CONTROLLED ANALGESIA SYRINGE INTRAVENOUS EVERY 5 MIN PRN
Status: DISCONTINUED | OUTPATIENT
Start: 2024-05-13 | End: 2024-05-13 | Stop reason: HOSPADM

## 2024-05-13 RX ORDER — NALOXONE HYDROCHLORIDE 0.4 MG/ML
0.1 INJECTION, SOLUTION INTRAMUSCULAR; INTRAVENOUS; SUBCUTANEOUS
Status: DISCONTINUED | OUTPATIENT
Start: 2024-05-13 | End: 2024-05-13 | Stop reason: HOSPADM

## 2024-05-13 RX ORDER — CEPHALEXIN 500 MG/1
500 CAPSULE ORAL 3 TIMES DAILY
Qty: 9 CAPSULE | Refills: 0 | Status: SHIPPED | OUTPATIENT
Start: 2024-05-13 | End: 2024-05-16

## 2024-05-13 RX ORDER — NALOXONE HYDROCHLORIDE 0.4 MG/ML
0.4 INJECTION, SOLUTION INTRAMUSCULAR; INTRAVENOUS; SUBCUTANEOUS
Status: DISCONTINUED | OUTPATIENT
Start: 2024-05-13 | End: 2024-05-16 | Stop reason: HOSPADM

## 2024-05-13 RX ORDER — SCOLOPAMINE TRANSDERMAL SYSTEM 1 MG/1
1 PATCH, EXTENDED RELEASE TRANSDERMAL ONCE
Status: COMPLETED | OUTPATIENT
Start: 2024-05-13 | End: 2024-05-14

## 2024-05-13 RX ORDER — OXYCODONE HYDROCHLORIDE 5 MG/1
5 TABLET ORAL
Status: CANCELLED | OUTPATIENT
Start: 2024-05-13

## 2024-05-13 RX ORDER — KETAMINE HYDROCHLORIDE 10 MG/ML
INJECTION INTRAMUSCULAR; INTRAVENOUS PRN
Status: DISCONTINUED | OUTPATIENT
Start: 2024-05-13 | End: 2024-05-13

## 2024-05-13 RX ORDER — DEXAMETHASONE SODIUM PHOSPHATE 10 MG/ML
INJECTION, SOLUTION INTRAMUSCULAR; INTRAVENOUS PRN
Status: DISCONTINUED | OUTPATIENT
Start: 2024-05-13 | End: 2024-05-13

## 2024-05-13 RX ORDER — HALOPERIDOL 5 MG/ML
1 INJECTION INTRAMUSCULAR
Status: CANCELLED | OUTPATIENT
Start: 2024-05-13

## 2024-05-13 RX ORDER — MAGNESIUM SULFATE 4 G/50ML
4 INJECTION INTRAVENOUS ONCE
Status: COMPLETED | OUTPATIENT
Start: 2024-05-13 | End: 2024-05-14

## 2024-05-13 RX ORDER — HYDROMORPHONE HYDROCHLORIDE 2 MG/1
2 TABLET ORAL EVERY 4 HOURS PRN
Status: DISCONTINUED | OUTPATIENT
Start: 2024-05-13 | End: 2024-05-16 | Stop reason: HOSPADM

## 2024-05-13 RX ORDER — BISACODYL 10 MG
10 SUPPOSITORY, RECTAL RECTAL DAILY PRN
Status: DISCONTINUED | OUTPATIENT
Start: 2024-05-16 | End: 2024-05-16 | Stop reason: HOSPADM

## 2024-05-13 RX ORDER — ONDANSETRON 4 MG/1
4 TABLET, ORALLY DISINTEGRATING ORAL EVERY 6 HOURS PRN
Qty: 16 TABLET | Refills: 0 | Status: SHIPPED | OUTPATIENT
Start: 2024-05-13 | End: 2024-06-18

## 2024-05-13 RX ORDER — FENTANYL CITRATE 50 UG/ML
50 INJECTION, SOLUTION INTRAMUSCULAR; INTRAVENOUS EVERY 5 MIN PRN
Status: DISCONTINUED | OUTPATIENT
Start: 2024-05-13 | End: 2024-05-13 | Stop reason: HOSPADM

## 2024-05-13 RX ORDER — LIDOCAINE 40 MG/G
CREAM TOPICAL
Status: DISCONTINUED | OUTPATIENT
Start: 2024-05-13 | End: 2024-05-13 | Stop reason: HOSPADM

## 2024-05-13 RX ORDER — FENTANYL CITRATE 50 UG/ML
INJECTION, SOLUTION INTRAMUSCULAR; INTRAVENOUS PRN
Status: DISCONTINUED | OUTPATIENT
Start: 2024-05-13 | End: 2024-05-13

## 2024-05-13 RX ORDER — DIMENHYDRINATE 50 MG/ML
25 INJECTION, SOLUTION INTRAMUSCULAR; INTRAVENOUS
Status: DISCONTINUED | OUTPATIENT
Start: 2024-05-13 | End: 2024-05-13 | Stop reason: HOSPADM

## 2024-05-13 RX ORDER — ONDANSETRON 4 MG/1
4 TABLET, ORALLY DISINTEGRATING ORAL EVERY 30 MIN PRN
Status: CANCELLED | OUTPATIENT
Start: 2024-05-13

## 2024-05-13 RX ORDER — NALOXONE HYDROCHLORIDE 0.4 MG/ML
0.1 INJECTION, SOLUTION INTRAMUSCULAR; INTRAVENOUS; SUBCUTANEOUS
Status: CANCELLED | OUTPATIENT
Start: 2024-05-13

## 2024-05-13 RX ORDER — ONDANSETRON 2 MG/ML
4 INJECTION INTRAMUSCULAR; INTRAVENOUS EVERY 30 MIN PRN
Status: DISCONTINUED | OUTPATIENT
Start: 2024-05-13 | End: 2024-05-13 | Stop reason: HOSPADM

## 2024-05-13 RX ADMIN — PROPOFOL 100 MCG/KG/MIN: 10 INJECTION, EMULSION INTRAVENOUS at 17:13

## 2024-05-13 RX ADMIN — PROPOFOL 30 MG: 10 INJECTION, EMULSION INTRAVENOUS at 19:37

## 2024-05-13 RX ADMIN — PHENYLEPHRINE HYDROCHLORIDE 100 MCG: 10 INJECTION INTRAVENOUS at 12:04

## 2024-05-13 RX ADMIN — PHENYLEPHRINE HYDROCHLORIDE 100 MCG: 10 INJECTION INTRAVENOUS at 11:48

## 2024-05-13 RX ADMIN — SODIUM CHLORIDE, POTASSIUM CHLORIDE, SODIUM LACTATE AND CALCIUM CHLORIDE: 600; 310; 30; 20 INJECTION, SOLUTION INTRAVENOUS at 21:13

## 2024-05-13 RX ADMIN — FENTANYL CITRATE 50 MCG: 50 INJECTION INTRAMUSCULAR; INTRAVENOUS at 12:56

## 2024-05-13 RX ADMIN — PROPOFOL 40 MG: 10 INJECTION, EMULSION INTRAVENOUS at 19:57

## 2024-05-13 RX ADMIN — PROPOFOL 125 MCG/KG/MIN: 10 INJECTION, EMULSION INTRAVENOUS at 14:43

## 2024-05-13 RX ADMIN — Medication 2 G: at 16:02

## 2024-05-13 RX ADMIN — SENNOSIDES AND DOCUSATE SODIUM 1 TABLET: 8.6; 5 TABLET ORAL at 22:28

## 2024-05-13 RX ADMIN — HYDROMORPHONE HYDROCHLORIDE 0.2 MG: 0.2 INJECTION, SOLUTION INTRAMUSCULAR; INTRAVENOUS; SUBCUTANEOUS at 23:51

## 2024-05-13 RX ADMIN — SODIUM CHLORIDE, POTASSIUM CHLORIDE, SODIUM LACTATE AND CALCIUM CHLORIDE: 600; 310; 30; 20 INJECTION, SOLUTION INTRAVENOUS at 17:36

## 2024-05-13 RX ADMIN — ACETAMINOPHEN 975 MG: 325 TABLET ORAL at 22:28

## 2024-05-13 RX ADMIN — PROPOFOL 200 MG: 10 INJECTION, EMULSION INTRAVENOUS at 11:48

## 2024-05-13 RX ADMIN — FENTANYL CITRATE 50 MCG: 50 INJECTION INTRAMUSCULAR; INTRAVENOUS at 12:23

## 2024-05-13 RX ADMIN — SODIUM CHLORIDE, POTASSIUM CHLORIDE, SODIUM LACTATE AND CALCIUM CHLORIDE: 600; 310; 30; 20 INJECTION, SOLUTION INTRAVENOUS at 12:58

## 2024-05-13 RX ADMIN — Medication 7.5 MG: at 15:57

## 2024-05-13 RX ADMIN — SODIUM CHLORIDE, POTASSIUM CHLORIDE, SODIUM LACTATE AND CALCIUM CHLORIDE: 600; 310; 30; 20 INJECTION, SOLUTION INTRAVENOUS at 08:56

## 2024-05-13 RX ADMIN — Medication 7.5 MG: at 20:06

## 2024-05-13 RX ADMIN — PROPOFOL 110 MCG/KG/MIN: 10 INJECTION, EMULSION INTRAVENOUS at 16:20

## 2024-05-13 RX ADMIN — Medication 7.5 MG: at 18:09

## 2024-05-13 RX ADMIN — DEXAMETHASONE SODIUM PHOSPHATE 5 MG: 10 INJECTION, SOLUTION INTRAMUSCULAR; INTRAVENOUS at 11:48

## 2024-05-13 RX ADMIN — MAGNESIUM SULFATE HEPTAHYDRATE 4 G: 80 INJECTION, SOLUTION INTRAVENOUS at 08:56

## 2024-05-13 RX ADMIN — HYDROMORPHONE HYDROCHLORIDE 0.5 MG: 1 INJECTION, SOLUTION INTRAMUSCULAR; INTRAVENOUS; SUBCUTANEOUS at 13:41

## 2024-05-13 RX ADMIN — FENTANYL CITRATE 50 MCG: 50 INJECTION INTRAMUSCULAR; INTRAVENOUS at 12:47

## 2024-05-13 RX ADMIN — HYDROMORPHONE HYDROCHLORIDE 0.5 MG: 1 INJECTION, SOLUTION INTRAMUSCULAR; INTRAVENOUS; SUBCUTANEOUS at 12:43

## 2024-05-13 RX ADMIN — FENTANYL CITRATE 150 MCG: 50 INJECTION INTRAMUSCULAR; INTRAVENOUS at 11:48

## 2024-05-13 RX ADMIN — ONDANSETRON 4 MG: 2 INJECTION INTRAMUSCULAR; INTRAVENOUS at 12:45

## 2024-05-13 RX ADMIN — PROPOFOL 150 MCG/KG/MIN: 10 INJECTION, EMULSION INTRAVENOUS at 11:48

## 2024-05-13 RX ADMIN — HYDROMORPHONE HYDROCHLORIDE 0.5 MG: 1 INJECTION, SOLUTION INTRAMUSCULAR; INTRAVENOUS; SUBCUTANEOUS at 16:06

## 2024-05-13 RX ADMIN — HYDROMORPHONE HYDROCHLORIDE 0.5 MG: 1 INJECTION, SOLUTION INTRAMUSCULAR; INTRAVENOUS; SUBCUTANEOUS at 19:44

## 2024-05-13 RX ADMIN — PROPOFOL 40 MG: 10 INJECTION, EMULSION INTRAVENOUS at 20:10

## 2024-05-13 RX ADMIN — ONDANSETRON 4 MG: 2 INJECTION INTRAMUSCULAR; INTRAVENOUS at 14:33

## 2024-05-13 RX ADMIN — PROPOFOL 125 MCG/KG/MIN: 10 INJECTION, EMULSION INTRAVENOUS at 15:29

## 2024-05-13 RX ADMIN — Medication 2 G: at 20:04

## 2024-05-13 RX ADMIN — PROPOFOL 40 MG: 10 INJECTION, EMULSION INTRAVENOUS at 20:02

## 2024-05-13 RX ADMIN — KETAMINE HYDROCHLORIDE 50 MG: 10 INJECTION INTRAMUSCULAR; INTRAVENOUS at 11:48

## 2024-05-13 RX ADMIN — TRANEXAMIC ACID 1 G: 1 INJECTION, SOLUTION INTRAVENOUS at 12:30

## 2024-05-13 RX ADMIN — SODIUM CHLORIDE, POTASSIUM CHLORIDE, SODIUM LACTATE AND CALCIUM CHLORIDE: 600; 310; 30; 20 INJECTION, SOLUTION INTRAVENOUS at 19:38

## 2024-05-13 RX ADMIN — PROPOFOL 30 MG: 10 INJECTION, EMULSION INTRAVENOUS at 20:15

## 2024-05-13 RX ADMIN — MIDAZOLAM 2 MG: 1 INJECTION INTRAMUSCULAR; INTRAVENOUS at 11:39

## 2024-05-13 RX ADMIN — SCOPALAMINE 1 PATCH: 1 PATCH, EXTENDED RELEASE TRANSDERMAL at 08:57

## 2024-05-13 RX ADMIN — LIDOCAINE HYDROCHLORIDE 20 MG: 10 INJECTION, SOLUTION INFILTRATION; PERINEURAL at 11:48

## 2024-05-13 RX ADMIN — Medication 2 G: at 11:55

## 2024-05-13 ASSESSMENT — ACTIVITIES OF DAILY LIVING (ADL)
ADLS_ACUITY_SCORE: 35
ADLS_ACUITY_SCORE: 33
ADLS_ACUITY_SCORE: 35
ADLS_ACUITY_SCORE: 39
ADLS_ACUITY_SCORE: 37
ADLS_ACUITY_SCORE: 35

## 2024-05-13 NOTE — ANESTHESIA PREPROCEDURE EVALUATION
Anesthesia Pre-Procedure Evaluation    Patient: Andres Trivedi   MRN: [id number] : 1985        Procedure : Procedure(s):  MAMMOPLASTY, REDUCTION          Past Medical History:   Diagnosis Date    Chronic bilateral thoracic back pain     Depressive disorder 2019    Was on Lexapro for 2 years. No current medications.    Macromastia     Neck pain     Obesity     Transfusion of blood product declined due to Sikh reason       Past Surgical History:   Procedure Laterality Date    NO HISTORY OF SURGERY        No Known Allergies   Social History     Tobacco Use    Smoking status: Never     Passive exposure: Never    Smokeless tobacco: Never    Tobacco comments:     no smokers in the household   Substance Use Topics    Alcohol use: Yes     Alcohol/week: 1.0 standard drink of alcohol     Types: 1 Standard drinks or equivalent per week     Comment: occ.- social. some weeks none. 1-2 socially.      Wt Readings from Last 1 Encounters:   24 90.1 kg (198 lb 11.2 oz)        Anesthesia Evaluation   Pt has not had prior anesthetic         ROS/MED HX  ENT/Pulmonary:  - neg pulmonary ROS     Neurologic:  - neg neurologic ROS     Cardiovascular:  - neg cardiovascular ROS     METS/Exercise Tolerance: >4 METS    Hematologic:  - neg hematologic  ROS     Musculoskeletal:       GI/Hepatic:  - neg GI/hepatic ROS     Renal/Genitourinary:  - neg Renal ROS     Endo:     (+)               Obesity,       Psychiatric/Substance Use:  - neg psychiatric ROS     Infectious Disease:  - neg infectious disease ROS     Malignancy:  - neg malignancy ROS     Other:  - neg other ROS    (+)  , H/O Chronic Pain,         Physical Exam    Airway  airway exam normal      Mallampati: II   TM distance: > 3 FB   Neck ROM: full   Mouth opening: > 3 cm    Respiratory Devices and Support         Dental  no notable dental history         Cardiovascular   cardiovascular exam normal          Pulmonary   pulmonary exam normal                OUTSIDE  "LABS:  CBC:   Lab Results   Component Value Date    WBC 8.7 02/24/2023    HGB 13.1 02/24/2023    HCT 40.3 02/24/2023     02/24/2023     BMP:   Lab Results   Component Value Date     02/24/2023    POTASSIUM 4.2 02/24/2023    CHLORIDE 110 (H) 02/24/2023    CO2 26 02/24/2023    BUN 10 02/24/2023    CR 0.66 02/24/2023    GLC 92 02/24/2023    GLC 92 02/24/2023     COAGS: No results found for: \"PTT\", \"INR\", \"FIBR\"  POC: No results found for: \"BGM\", \"HCG\", \"HCGS\"  HEPATIC: No results found for: \"ALBUMIN\", \"PROTTOTAL\", \"ALT\", \"AST\", \"GGT\", \"ALKPHOS\", \"BILITOTAL\", \"BILIDIRECT\", \"CRYSTAL\"  OTHER:   Lab Results   Component Value Date    HENNA 8.6 02/24/2023    TSH 1.03 02/24/2023       Anesthesia Plan    ASA Status:  2    NPO Status:  NPO Appropriate    Anesthesia Type: General.     - Airway: LMA   Induction: Intravenous, Propofol.   Maintenance: TIVA.        Consents    Anesthesia Plan(s) and associated risks, benefits, and realistic alternatives discussed. Questions answered and patient/representative(s) expressed understanding.     - Discussed:     - Discussed with:  Patient      - Extended Intubation/Ventilatory Support Discussed: No.      - Patient is DNR/DNI Status: No     Use of blood products discussed: No .     Postoperative Care    Pain management: IV analgesics, Multi-modal analgesia.     - Plan for long acting post-op opioid use   PONV prophylaxis: Ondansetron (or other 5HT-3), Dexamethasone or Solumedrol, Droperidol or Haldol, Scopolamine patch     Comments:    Other Comments: 50 mg ketamine IV on induction.             Norberto Aguirre MD    I have reviewed the pertinent notes and labs in the chart from the past 30 days and (re)examined the patient.  Any updates or changes from those notes are reflected in this note.              # Obesity: Estimated body mass index is 36.34 kg/m  as calculated from the following:    Height as of this encounter: 1.575 m (5' 2\").    Weight as of this encounter: 90.1 kg " (198 lb 11.2 oz).

## 2024-05-13 NOTE — ANESTHESIA PROCEDURE NOTES
Airway       Patient location during procedure: OR  Staff -        CRNA: Daysi Pfeiffer APRN CRNA       Performed By: CRNAIndications and Patient Condition       Indications for airway management: adelia-procedural       Induction type:intravenous       Mask difficulty assessment: 1 - vent by mask    Final Airway Details       Final airway type: endotracheal airway       Successful airway: ETT - single and Oral  Endotracheal Airway Details        ETT size (mm): 7.0       Cuffed: yes       Successful intubation technique: direct laryngoscopy       DL Blade Type: Silvestre 2       Grade View of Cords: 1       Adjucts: stylet and tooth guard       Position: Right       Measured from: gums/teeth       Secured at (cm): 22       Bite block used: None    Post intubation assessment        Placement verified by: capnometry, equal breath sounds and chest rise        Number of attempts at approach: 1       Secured with: silk tape       Ease of procedure: easy       Dentition: Intact and Unchanged       Dental guard used and removed. Dental Guard Type: Proguard Red.

## 2024-05-13 NOTE — INTERVAL H&P NOTE
"I have reviewed the surgical (or preoperative) H&P that is linked to this encounter, and examined the patient. There are no significant changes    Clinical Conditions Present on Arrival:  Clinically Significant Risk Factors Present on Admission                  # Obesity: Estimated body mass index is 36.34 kg/m  as calculated from the following:    Height as of this encounter: 1.575 m (5' 2\").    Weight as of this encounter: 90.1 kg (198 lb 11.2 oz).     Avinash Johnson MD , FACS   Diplomate American Board of Plastic Surgery  Diplomate American Board of Surgery  Adj. Assistant Professor of Surgery  Division of Plastic & Reconstructive Surgery   [facility]  Office: [phone]   5/13/2024 at 11:42 AM   "

## 2024-05-14 LAB — GLUCOSE BLDC GLUCOMTR-MCNC: 103 MG/DL (ref 70–99)

## 2024-05-14 PROCEDURE — 250N000011 HC RX IP 250 OP 636: Performed by: PLASTIC SURGERY

## 2024-05-14 PROCEDURE — 250N000013 HC RX MED GY IP 250 OP 250 PS 637: Performed by: PLASTIC SURGERY

## 2024-05-14 PROCEDURE — 82962 GLUCOSE BLOOD TEST: CPT

## 2024-05-14 PROCEDURE — G0378 HOSPITAL OBSERVATION PER HR: HCPCS

## 2024-05-14 RX ADMIN — NITROGLYCERIN 15 MG: 20 OINTMENT TOPICAL at 23:13

## 2024-05-14 RX ADMIN — CEFAZOLIN SODIUM 2 G: 2 INJECTION, SOLUTION INTRAVENOUS at 11:52

## 2024-05-14 RX ADMIN — ACETAMINOPHEN 975 MG: 325 TABLET ORAL at 05:09

## 2024-05-14 RX ADMIN — HYDROMORPHONE HYDROCHLORIDE 4 MG: 4 TABLET ORAL at 19:14

## 2024-05-14 RX ADMIN — HYDROMORPHONE HYDROCHLORIDE 2 MG: 2 TABLET ORAL at 10:40

## 2024-05-14 RX ADMIN — ACETAMINOPHEN 975 MG: 325 TABLET ORAL at 21:26

## 2024-05-14 RX ADMIN — SENNOSIDES AND DOCUSATE SODIUM 1 TABLET: 8.6; 5 TABLET ORAL at 20:21

## 2024-05-14 RX ADMIN — HYDROMORPHONE HYDROCHLORIDE 4 MG: 4 TABLET ORAL at 05:57

## 2024-05-14 RX ADMIN — CEFAZOLIN SODIUM 2 G: 2 INJECTION, SOLUTION INTRAVENOUS at 05:09

## 2024-05-14 RX ADMIN — HYDROMORPHONE HYDROCHLORIDE 0.4 MG: 0.2 INJECTION, SOLUTION INTRAMUSCULAR; INTRAVENOUS; SUBCUTANEOUS at 16:46

## 2024-05-14 RX ADMIN — ACETAMINOPHEN 975 MG: 325 TABLET ORAL at 13:05

## 2024-05-14 RX ADMIN — SENNOSIDES AND DOCUSATE SODIUM 1 TABLET: 8.6; 5 TABLET ORAL at 08:00

## 2024-05-14 RX ADMIN — NITROGLYCERIN 15 MG: 20 OINTMENT TOPICAL at 15:44

## 2024-05-14 RX ADMIN — HYDROMORPHONE HYDROCHLORIDE 2 MG: 2 TABLET ORAL at 14:40

## 2024-05-14 RX ADMIN — POLYETHYLENE GLYCOL 3350 17 G: 17 POWDER, FOR SOLUTION ORAL at 08:01

## 2024-05-14 ASSESSMENT — ACTIVITIES OF DAILY LIVING (ADL)
ADLS_ACUITY_SCORE: 36
ADLS_ACUITY_SCORE: 39
ADLS_ACUITY_SCORE: 39
ADLS_ACUITY_SCORE: 36
ADLS_ACUITY_SCORE: 36
ADLS_ACUITY_SCORE: 35
ADLS_ACUITY_SCORE: 40
ADLS_ACUITY_SCORE: 35
ADLS_ACUITY_SCORE: 35
ADLS_ACUITY_SCORE: 39
ADLS_ACUITY_SCORE: 35
ADLS_ACUITY_SCORE: 35
ADLS_ACUITY_SCORE: 39
ADLS_ACUITY_SCORE: 39
ADLS_ACUITY_SCORE: 36
ADLS_ACUITY_SCORE: 39
ADLS_ACUITY_SCORE: 35
ADLS_ACUITY_SCORE: 36
ADLS_ACUITY_SCORE: 40
ADLS_ACUITY_SCORE: 35

## 2024-05-14 NOTE — OP NOTE
May 14, 2024    Andres Trivedi    [id number]     Preoperative diagnosis: Symptomatic bilateral macromastia.    Postoperative diagnosis: Same.    Procedures:      1) Bilateral reduction mammoplasties.     2) Bilateral nipple areolar complex (NAC) interrogation with the SPY machine    Surgeon: Avinash Johnson MD.    Assistant: None    Anesthesia: General.    IV fluid: 3300 mL     Tumescent solution: 600 mL (from 1000 mL of lactated Omak mixed with 1 mg of epinephrine).  300 mL was infiltrated on each breast, for a total of 600 mL.    EBL: 50 ml.    U/O: 1300 ml.    Findings: Bilateral macromastia.  Right breast was significantly larger than the left breast.    Specimens: Right breast parenchyma, 1318 grams.  Left breast parenchyma, 744 grams.    Drains: # 15 FR Blaine drain per breast.  2 drains total.    Disposition: Patient tolerated procedure well.  She was extubated and transferred to recovery room awake and in stable condition.     Indications:     Ms. Trivedi is a 39 year old lady who presented with symptomatic bilateral macromastia characterized by neck pain, upper back pain, bilateral shoulder grooving, occasional intertrigo as well as difficulty to asleep and to exercise secondary to the heaviness of her breasts.  She has failed nonmedical treatment with physical therapy and non steroidal anti-inflammatory medications.  Patient has been been deemed an appropriate surgical candidate for bilateral reduction mammoplasties with a Wise pattern technique and inferior pedicle bilaterally.    Risks has been explained to the patient and they include but are not limited to scarring, keloid formation, infection, wound healing problems, hematoma, temporary versus permanent altered sensation on the skin of the breasts as well as on the nipple areolar complexes, necrosis of the nipple areolar complex (NAC) with possibility of future tattooing of the NAC, hypopigmentation on the NAC, breast asymmetries, NAC asymmetries,  inability to breast-feed, need for further surgeries.  Patient has acknowledged all these risks, has signed informed consent and has agreed to proceed.    Procedure:     Patient was identified in the preoperative holding area and preoperative IV antibiotics were given to her. I then proceeded to draw the Wise pattern bilaterally for her bilateral reduction mammoplasties.     First, I localized the acromioclavicular junction on each side of her shoulders as well as the sternal notch.  Then I measured the distance between each acromioclavicular junction to the sternal notch.  The center of this distance was marked and the breast meridian was drawn on the anterior surface of each breast.  Then I proceeded to localize  Pitanguy's point on the anterior surface of each breast and then marked 2 cm superior to this point. From this point and utilizing a keyhole pattern, I draw a 6 cm line medially and laterally on each breast encompassing the NAC.  These 2 lines will join in the future in order to create the vertical limb of the superior flap of the Wise pattern.  From the inferior end of each of these 6 cm lines, I draw a transverse line both medially and laterally on each breast until they met the medial and the lateral ends of the inframammary fold (IMF).     This was the drawing of the Soriano pattern:                       Patient was then brought to the operating room and was placed supine in the operating room table. After general anesthesia was obtained the chest and both breasts were prepped and draped in the sterile surgical fashion.     We then proceeded to design and demarcate the inferior pedicle on both breasts.  First I localized the breast meridian on the right breast IMF. From this point I measured 4.5 cm laterally and 4.5 cm medially to the breast meridian and this created a 9 cm width for the inferior pedicle that extended superiorly along the breast and encompassing the NAC.  On the left breast, the pedicle  measured 10 cm width.     At this time I made a stab incision with scalpel #15 on the anterior and lateral aspect of bilateral inframammary fold and proceeded to infiltrate the breast base with tumescent solution in order to minimize bleeding and to facilitate breast parenchyma dissection.    Utilizing a 38 mm cookie cutter placed on the center of the NAC, I proceeded to make hash marks, utilizing a #15 blade, on the impression left by the cookie-cutter on the NAC.  Utilizing a #15 blade we proceeded to make hash marks along the Wise pattern markings, bilateral.    We then proceeded to de-epithelialized the inferior pedicles bilaterally.  Then, the superior flap of the Wise pattern on each breast was elevated with electrocautery.  The dissection was carried out until we reached the clavicle bilaterally.  Each flap was elevated from the fascia of the pectoralis major muscle underneath.  The fascia was left undisturbed.      At this time we proceeded to resect the skin and breast parenchyma that was medial, cranial and lateral to the inferior pedicle.    After the above-mentioned skin and breast parenchymal resections, on the right breast we removed 1318 grams, and on the left breast we remove 744 grams.    Then we proceeded to provide irrigation with antibiotic solution and we found that hemostasis was excellent on both breasts.  A #15 Georgian Blaine drain was applied to each breast, and the drains were secured to the skin with 2-0 silk stitch.    We also applied a 2-0 silk reference stitch at the 12 o'clock position on each NAC for future reference and to avoid any twisting of the pedicle during the future inset of the NAC on the superior flap.    At this time the lateral and medial opening of the superior flap of the Wise pattern were temporarily closed with staples as a vertical limb on each breast.  The superior flap was also approximated to the IMF with temporary staples as well.  Finally, the NAC was inset on the  superior flap at the level of the opening designed by the keyhole pattern with temporary staples as well.    We then proceeded to sit the patient up in order to compare symmetry on both breasts and we were satisfied with the symmetry achieved.     The patient was then placed back in the supine position and we proceeded to close all the incisions.    Each NAC was permanently inset with 3-0 Monocryl buried interrupted stitches.    The vertical limb of the superior flap was then closed in layers with 3-0 Monocryl buried interrupted stitches. Each IMF was also closed in layers, first with 3-0 Monocryl buried interrupted stitches followed by 4-0 STRATAFIX running subcuticular stitches.     After closure, I noticed that both NAC were looking dusky, with a slightly delayed capillary refill of approximately 3 seconds.  This was more noticeable on the right breast compared to the left breast.  Due to this fact, I decided to interrogate both NAC with the SPY machine.     The ICG dye for the SPY machine was given intravenously by the CRNA and with a hand-held probe I proceeded to interrogate bilateral NAC with the SPY machine.  The percentage of perfusion was less than 5%, which confirmed acute ischemia on bilateral NAC.     Immediately, all the stitches around the areola: 3-0 Monocryl's were taken down.  I inspected both pedicles and the space around the pedicles and did not find any evidence of twisting of the pedicle or hematoma.  I did resect however more tissue on the cranial aspect of the right breast pedicle, just behind the right breast NAC.  There was a lot of dense tissue in the area.    I apply warm compresses on both NAC and after few minutes I re interrogated it with the spy machine and found that the percentage of perfusion was back to normal: Greater than 50%, bilateral.     I then decided to re inset both NAC with 3-0 Monocryl multiple interrupted stitches and I did not close the skin around the areola (I did  not apply 4-0 STRATAFIX subcuticular stitches around each NAC).  I did this on purpose to prevent any further ischemia on the NAC.     I re interrogate the NAC with the spy machine and confirm excellent perfusion.     I then applied Nitropaste on both NAC.    Bacitracin ointment followed by Xeroform gauze strip were applied along the incisions.      Instrument count was reported by nursing personnel as correct.  Patient tolerated procedure well and she was extubated and transferred to recovery room awake and in stable condition.    Avinash Johnson MD , FACS   Diplomate American Board of Plastic Surgery  Diplomate American Board of Surgery  Adj. Assistant Professor of Surgery  Division of Plastic & Reconstructive Surgery   [facility]  Office: [phone]   5/14/2024 at 4:55 PM

## 2024-05-14 NOTE — PROGRESS NOTES
Capnography on and patient tolerating. IS provided and education given on proper usage. SCD on. Starting with clear liquid diet.

## 2024-05-14 NOTE — BRIEF OP NOTE
[facility]    Brief Operative Note    Pre-operative diagnosis: Macromastia [N62]  Post-operative diagnosis Same as pre-operative diagnosis    Procedure: MAMMOPLASTY, REDUCTION, Bilateral - Breast    Surgeon: Surgeons and Role:     * Avinash Johnson MD - Primary  Anesthesia: General   Estimated Blood Loss: 550 mL from 5/13/2024 11:41 AM to 5/13/2024  8:30 PM      Drains: Jayant-Luong  Specimens:   ID Type Source Tests Collected by Time Destination   1 : left breast tissue Tissue Breast, Left SURGICAL PATHOLOGY EXAM Avinash Johnson MD 5/13/2024  2:21 PM    2 : RIGHT BREAST TISSUE Tissue Breast, Right SURGICAL PATHOLOGY EXAM Avinash Johnson MD 5/13/2024  4:48 PM      Findings:   None.  Complications: None.  Implants: * No implants in log *      Avinash Johnson MD , FACS   Diplomate American Board of Plastic Surgery  Diplomate American Board of Surgery  Adj. Assistant Professor of Surgery  Division of Plastic & Reconstructive Surgery   [facility]  Office: [phone]   5/13/2024 at 8:56 PM

## 2024-05-14 NOTE — PLAN OF CARE
"PRIMARY DIAGNOSIS: \"GENERIC\" NURSING  OUTPATIENT/OBSERVATION GOALS TO BE MET BEFORE DISCHARGE:  ADLs back to baseline: No    Activity and level of assistance:     Pain status: Improved but still requiring IV narcotics.    Return to near baseline physical activity: No     Discharge Planner Nurse   Safe discharge environment identified: Yes  Barriers to discharge: Yes       Entered by: Vipul Obando RN 05/14/2024 3:53 AM     Please review provider order for any additional goals.   Nurse to notify provider when observation goals have been met and patient is ready for discharge.      " English

## 2024-05-14 NOTE — PROGRESS NOTES
Patient arrived to the unit from PCU at 2145 on a stretcher. Transferred to bed with hover mat and maximum assist. Postop vitals as per order. Patient appears alert and pleasant.

## 2024-05-14 NOTE — PROGRESS NOTES
RECEIVING UNIT PACU HANDOFF REVIEW    PACU Nurse Handoff Report was reviewed by: Vipul Obando RN on May 13, 2024 at 9:38 PM

## 2024-05-14 NOTE — PLAN OF CARE
Problem: Adult Inpatient Plan of Care  Goal: Plan of Care Review  Description: The Plan of Care Review/Shift note should be completed every shift.  The Outcome Evaluation is a brief statement about your assessment that the patient is improving, declining, or no change.  This information will be displayed automatically on your shift  note.  Outcome: Progressing  Flowsheets (Taken 5/14/2024 0608)  Plan of Care Reviewed With: patient  Overall Patient Progress: improving   Goal Outcome Evaluation:    POD 1 bilateral breast reduction.  Pain managed with IV and PO dilaudid. Patient took first po dilaudid this morning at 0557. 2 FARZAD drains. Passed gas.  Dangled and ambulated from bed to bathroom with assist of 1.  Voided.Tolerated clear liquid diet. Diet advanced to full liquid.  Head of bed at 30 degrees.

## 2024-05-14 NOTE — ANESTHESIA CARE TRANSFER NOTE
Patient: Andres Trivedi    Procedure: Procedure(s):  MAMMOPLASTY, REDUCTION       Diagnosis: Macromastia [N62]  Diagnosis Additional Information: No value filed.    Anesthesia Type:   General     Note:  Anesthesia Care Transfer Notewriter  Vitals:  Vitals Value Taken Time   BP 97/57 05/13/24 2031   Temp     Pulse 87 05/13/24 2033   Resp 21 05/13/24 2031   SpO2 98 % 05/13/24 2033   Vitals shown include unfiled device data.    Electronically Signed By: ANIL Sam CRNA  May 13, 2024  8:35 PM

## 2024-05-14 NOTE — PHARMACY-ADMISSION MEDICATION HISTORY
Pharmacist Admission Medication History    Admission medication history is complete. The information provided in this note is only as accurate as the sources available at the time of the update.    Information Source(s): Clinic records and RN completed preop  via N/A    Pertinent Information: reviewed info already reviewed, no changes needed    Changes made to PTA medication list:  Added: None  Deleted: None  Changed: None    Allergies reviewed with patient and updates made in EHR: yes    Medication History Completed By: Toya Myrick [facility] 5/14/2024 7:41 AM    PTA Med List   Medication Sig Last Dose    cephALEXin (KEFLEX) 500 MG capsule Take 1 capsule (500 mg) by mouth 3 times daily for 3 days     HYDROcodone-acetaminophen (NORCO) 5-325 MG tablet Take 1-2 tablets by mouth every 4 hours as needed for moderate to severe pain     levonorgestrel (MIRENA) 20 MCG/DAY IUD 1 each (20 mcg) by Intrauterine route once 5/13/2024    ondansetron (ZOFRAN ODT) 4 MG ODT tab Take 1 tablet (4 mg) by mouth every 6 hours as needed for nausea     senna-docusate (SENOKOT-S/PERICOLACE) 8.6-50 MG tablet Take 1-2 tablets by mouth 2 times daily

## 2024-05-14 NOTE — PLAN OF CARE
Problem: Adult Inpatient Plan of Care  Goal: Optimal Comfort and Wellbeing  Outcome: Progressing    Goal Outcome Evaluation:      Plan of Care Reviewed With: patient      A&Ox4. RA. C/o minimal R breast pain radiating down to R arm. 2x prn oral dilaudid given. Heat & cold packs present at bedside. Chest binder in place. R & L FARZAD CDI w/serosang output. Daily BG w/lab was 103. Up sba. Continue POC.

## 2024-05-14 NOTE — PLAN OF CARE
PRIMARY DIAGNOSIS: S/P bilateral breast reduction  OUTPATIENT/OBSERVATION GOALS TO BE MET BEFORE DISCHARGE:  ADLs back to baseline: No    Activity and level of assistance: Up 1x assist    Pain status: Improved-controlled with oral pain medications.    Return to near baseline physical activity: No     Discharge Planner Nurse   Safe discharge environment identified: Yes  Barriers to discharge: Yes. Needing to have a BM.        Entered by: Yobany Delgado RN 05/14/2024 8:48 AM     Please review provider order for any additional goals.   Nurse to notify provider when observation goals have been met and patient is ready for discharge.

## 2024-05-14 NOTE — PLAN OF CARE
PRIMARY DIAGNOSIS: ACUTE PAIN  OUTPATIENT/OBSERVATION GOALS TO BE MET BEFORE DISCHARGE:  1. Pain Status: Improved-controlled with oral pain medications. 1x oral Dilaudid.    2. Return to near baseline physical activity: Yes    3. Cleared for discharge by consultants (if involved): No    Discharge Planner Nurse   Safe discharge environment identified: Yes  Barriers to discharge: Yes.        Entered by: Yobany Delgado RN 05/14/2024 12:35 PM     Please review provider order for any additional goals.   Nurse to notify provider when observation goals have been met and patient is ready for discharge.

## 2024-05-14 NOTE — ANESTHESIA CARE TRANSFER NOTE
Patient: Andres Trivedi    Procedure: Procedure(s):  MAMMOPLASTY, REDUCTION       Diagnosis: Macromastia [N62]  Diagnosis Additional Information: No value filed.    Anesthesia Type:   General     Note:    Oropharynx: oropharynx clear of all foreign objects  Level of Consciousness: drowsy  Oxygen Supplementation: face mask  Level of Supplemental Oxygen (L/min / FiO2): 6  Independent Airway: airway patency satisfactory and stable  Dentition: dentition unchanged  Vital Signs Stable: post-procedure vital signs reviewed and stable  Report to RN Given: handoff report given  Patient transferred to: PACU    Handoff Report: Identifed the Patient, Identified the Reponsible Provider, Reviewed the pertinent medical history, Discussed the surgical course, Reviewed Intra-OP anesthesia mangement and issues during anesthesia, Set expectations for post-procedure period and Allowed opportunity for questions and acknowledgement of understanding    Vitals:  Vitals Value Taken Time   BP 97/57 05/13/24 2031   Temp 97.9 f    Pulse 88 05/13/24 2034   Resp 21 05/13/24 2031   SpO2 98 % 05/13/24 2034   Vitals shown include unfiled device data.    Electronically Signed By: ANIL Sam CRNA  May 13, 2024  8:36 PM

## 2024-05-14 NOTE — PROGRESS NOTES
Ms. Trivedi is a 39 years old lady status post bilateral breast reduction.  She is less than 24 hours out from surgery.  Uneventful night.  Pain is well-controlled.  Tolerating diet.    At the physical exam, there is evidence of ischemia on bilateral nipple areolar complexes.  There is capillary refill, however, is delayed at approximately 3 seconds with vascular venous congestion.  Both nipple-areolar complexes look dusky.  This is more evident on the right breast compared to the left breast.  Otherwise patient presents with good shape and symmetry bilaterally.  No evidence of hematoma.  Bilateral Blaine drain with serosanguineous drainage.    Plan: Bedside emergency release of the periareolar stitches bilaterally and application of Nitropaste every 12 hours on each nipple-areolar complex.    Procedure: Bedside emergency release of the periareolar stitches bilaterally.    Both nipple-areolar complexes were cleansed with alcohol.  Utilizing 15 blade, all the periareolar stitches on the right breast were removed.  On the left breast, the interrupted stitches at 2:00, 4:00, 8:00 and 10:00 were removed.    Nitropaste was applied on both nipple areolar complexes.  Patient tolerated procedure well.                Patient will stay another 24 to 48 hours in-house for Nitropaste application and monitoring of the blood supply to bilateral nipple areolar complexes.    Avinash Johnson MD , FACS   Diplomate American Board of Plastic Surgery  Diplomate American Board of Surgery  Adj. Assistant Professor of Surgery  Division of Plastic & Reconstructive Surgery   [facility]  Office: [phone]   5/14/2024 at 4:31 PM

## 2024-05-15 LAB — GLUCOSE BLDC GLUCOMTR-MCNC: 91 MG/DL (ref 70–99)

## 2024-05-15 PROCEDURE — 82962 GLUCOSE BLOOD TEST: CPT

## 2024-05-15 PROCEDURE — 250N000013 HC RX MED GY IP 250 OP 250 PS 637: Performed by: PLASTIC SURGERY

## 2024-05-15 PROCEDURE — G0378 HOSPITAL OBSERVATION PER HR: HCPCS

## 2024-05-15 RX ADMIN — HYDROMORPHONE HYDROCHLORIDE 4 MG: 4 TABLET ORAL at 14:42

## 2024-05-15 RX ADMIN — ACETAMINOPHEN 975 MG: 325 TABLET ORAL at 13:08

## 2024-05-15 RX ADMIN — HYDROMORPHONE HYDROCHLORIDE 4 MG: 4 TABLET ORAL at 23:12

## 2024-05-15 RX ADMIN — HYDROMORPHONE HYDROCHLORIDE 4 MG: 4 TABLET ORAL at 18:37

## 2024-05-15 RX ADMIN — ACETAMINOPHEN 975 MG: 325 TABLET ORAL at 06:19

## 2024-05-15 RX ADMIN — ACETAMINOPHEN 975 MG: 325 TABLET ORAL at 22:14

## 2024-05-15 RX ADMIN — SENNOSIDES AND DOCUSATE SODIUM 1 TABLET: 8.6; 5 TABLET ORAL at 20:27

## 2024-05-15 RX ADMIN — SENNOSIDES AND DOCUSATE SODIUM 1 TABLET: 8.6; 5 TABLET ORAL at 07:57

## 2024-05-15 RX ADMIN — NITROGLYCERIN 15 MG: 20 OINTMENT TOPICAL at 13:10

## 2024-05-15 RX ADMIN — HYDROMORPHONE HYDROCHLORIDE 4 MG: 4 TABLET ORAL at 10:22

## 2024-05-15 RX ADMIN — POLYETHYLENE GLYCOL 3350 17 G: 17 POWDER, FOR SOLUTION ORAL at 07:57

## 2024-05-15 RX ADMIN — HYDROMORPHONE HYDROCHLORIDE 4 MG: 4 TABLET ORAL at 02:12

## 2024-05-15 RX ADMIN — HYDROMORPHONE HYDROCHLORIDE 4 MG: 4 TABLET ORAL at 06:20

## 2024-05-15 ASSESSMENT — ACTIVITIES OF DAILY LIVING (ADL)
ADLS_ACUITY_SCORE: 36
ADLS_ACUITY_SCORE: 33
ADLS_ACUITY_SCORE: 36
ADLS_ACUITY_SCORE: 36
ADLS_ACUITY_SCORE: 34
ADLS_ACUITY_SCORE: 34
ADLS_ACUITY_SCORE: 36
ADLS_ACUITY_SCORE: 34
ADLS_ACUITY_SCORE: 36
ADLS_ACUITY_SCORE: 34
ADLS_ACUITY_SCORE: 33
ADLS_ACUITY_SCORE: 36
ADLS_ACUITY_SCORE: 34
ADLS_ACUITY_SCORE: 36
ADLS_ACUITY_SCORE: 36
ADLS_ACUITY_SCORE: 34

## 2024-05-15 NOTE — PROGRESS NOTES
Ms. Trivedi is a 39 years old lady status post bilateral breast reduction.  She is postoperative day #2.  Patient was experiencing bilateral nipple areolar complexes ischemia, more pronounced on the right nipple compared to the left nipple.  She underwent emergency suture release at the bedside yesterday as well as application of Nitropaste on both nipple areolar complexes.  No events overnight.  Patient feeling well.    At the physical exam, right nipple presents with sign of persistent ischemia at 3 o'clock position and 9 o'clock position.  There is capillary refill, less than 2 seconds at 12 o'clock position and 6 o'clock position.    The left nipple presents with capillary refill less than 2 seconds.  No sign of persistent ischemia on the left nipple by physical exam.    There is no evidence of hematoma or surgical site infection or wound dehiscence bilaterally.  Overall good shape and symmetry bilaterally.  Blaine drains with serosanguineous drainage.                  Plan: Patient with persistent signs of ischemia on the right nipple.  I have explained to her that we will have to wait for demarcation in order to determine how much of her right nipple areolar complex will survive.  Her left nipple is no longer showing sign of ischemia.  This is welcoming news.    Continue with Nitropaste.    I will discharge her tomorrow in the afternoon.  Prior to discharge, I will remove her bilateral Blaine drains.    Once again, I will discharge her tomorrow in the afternoon after clinic.    Avinash Johnson MD , FACS   Diplomate American Board of Plastic Surgery  Diplomate American Board of Surgery  Adj. Assistant Professor of Surgery  Division of Plastic & Reconstructive Surgery   [facility]  Office: [phone]   5/15/2024 at 6:21 PM

## 2024-05-15 NOTE — PROGRESS NOTES
"PRIMARY DIAGNOSIS: \"GENERIC\" NURSING  OUTPATIENT/OBSERVATION GOALS TO BE MET BEFORE DISCHARGE:  ADLs back to baseline: No    Activity and level of assistance: Ambulating independently.    Pain status: Improved but still requiring IV narcotics.    Return to near baseline physical activity: Yes     Discharge Planner Nurse   Safe discharge environment identified: Yes  Barriers to discharge: Yes       Entered by: Johan Hill RN 05/14/2024 10:45 PM     A & O x 4. VSS except tachycardic & BP soft. Pain addressed with PRN and scheduled medication. L PIV SL. FARZAD drain x 2, R and L abdomen, dressings CDI. IS education provided, reinforced. Regular diet, tolerating well. Up with standby assist. No BM yet. Dressing change (ace wrap, Kerlix) over chest due at midnight. Nursing continue to monitor.  "

## 2024-05-15 NOTE — PROGRESS NOTES
"PRIMARY DIAGNOSIS: \"GENERIC\" NURSING  OUTPATIENT/OBSERVATION GOALS TO BE MET BEFORE DISCHARGE:  ADLs back to baseline: No    Activity and level of assistance: Up with standby assistance.    Pain status: Improved-controlled with oral pain medications.    Return to near baseline physical activity: No     Discharge Planner Nurse   Safe discharge environment identified: Yes  Barriers to discharge: Yes       Entered by: Tanya Schulz RN 05/15/2024 10:38 AM     Please review provider order for any additional goals.   Nurse to notify provider when observation goals have been met and patient is ready for discharge.  "

## 2024-05-15 NOTE — PLAN OF CARE
Goal Outcome Evaluation:       Pt. Pleasant and cooperative, cont to take PRN oral dilaudid q4hrs for bilateral surgical breast pain, pt. Up and walking hallway with walker and tolerating well, dressing changed to bilateral breasts, pt. Had a loose BM today, eating well, FARZAD drains patent L drain 8cc and R drain 15cc, will cont to monitor.

## 2024-05-15 NOTE — PROGRESS NOTES
"PRIMARY DIAGNOSIS: \"GENERIC\" NURSING  OUTPATIENT/OBSERVATION GOALS TO BE MET BEFORE DISCHARGE:  ADLs back to baseline: No    Activity and level of assistance: Ambulating independently.    Pain status: Improved but still requiring IV narcotics.    Return to near baseline physical activity: Yes     Discharge Planner Nurse   Safe discharge environment identified: Yes  Barriers to discharge: Yes         "

## 2024-05-15 NOTE — PLAN OF CARE
"PRIMARY DIAGNOSIS: \"GENERIC\" NURSING  OUTPATIENT/OBSERVATION GOALS TO BE MET BEFORE DISCHARGE:  ADLs back to baseline: No    Activity and level of assistance: Up with standby assistance.    Pain status: Improved-controlled with oral pain medications.    Return to near baseline physical activity: No     Discharge Planner Nurse   Safe discharge environment identified: Yes  Barriers to discharge: Yes       Entered by: Vipul Obando RN 05/15/2024 4:36 AM     Please review provider order for any additional goals.   Nurse to notify provider when observation goals have been met and patient is ready for discharge.         "

## 2024-05-15 NOTE — PLAN OF CARE
Problem: Adult Inpatient Plan of Care  Goal: Plan of Care Review  Description: The Plan of Care Review/Shift note should be completed every shift.  The Outcome Evaluation is a brief statement about your assessment that the patient is improving, declining, or no change.  This information will be displayed automatically on your shift  note.  Outcome: Progressing  Flowsheets (Taken 5/15/2024 0606)  Plan of Care Reviewed With: patient  Overall Patient Progress: improving   Goal Outcome Evaluation:      Plan of Care Reviewed With: patient    Overall Patient Progress: improvingOverall Patient Progress: improving    POD 2 bilateral breast reduction.  Nitrobid and dressing change to areola and dressing change every 12 hours.  Pain managed with PO dilaudid.  Patient has been ambulating to the bathroom.  2 FARZAD drain, 15 ml on the right, 10 ml on the left for output this morning. No bowel movement since surgery.

## 2024-05-15 NOTE — PLAN OF CARE
PRIMARY DIAGNOSIS: Bilateral Breast Reduction  OUTPATIENT/OBSERVATION GOALS TO BE MET BEFORE DISCHARGE:  ADLs back to baseline: Yes    Activity and level of assistance: Ambulating independently.    Pain status: Improved but still requiring oral pain narcotics    Return to near baseline physical activity: Yes     Discharge Planner Nurse   Safe discharge environment identified: Yes  Barriers to discharge: Yes       Entered by: Ubaldo Menon RN 05/15/2024 5:23 PM     Please review provider order for any additional goals.   Nurse to notify provider when observation goals have been met and patient is ready for discharge.    Goal Outcome Evaluation:      Plan of Care Reviewed With: patient    Overall Patient Progress: no changeOverall Patient Progress: no change    Assumed cares: [id number]  Vitals: VSS on RA  Pain: Pt reports pain at surgical site, PRN oral dilaudid provided to pt.   Neuro: A&Ox4  Cardiac: WDL  Respiratory: WDL  GI/: WDL  Skin: surgical wounds from bilateral breast reduction- dressing CDI  IV/Drains: PIV-SL  Activity: SBA  Behavior: Pt is calm and cooperative    Plan of Care: Follow patient plan of care. Possible discharge tomorrow at 4pm.

## 2024-05-16 VITALS
SYSTOLIC BLOOD PRESSURE: 98 MMHG | HEART RATE: 105 BPM | HEIGHT: 62 IN | DIASTOLIC BLOOD PRESSURE: 62 MMHG | WEIGHT: 202.82 LBS | TEMPERATURE: 98 F | BODY MASS INDEX: 37.32 KG/M2 | OXYGEN SATURATION: 96 % | RESPIRATION RATE: 18 BRPM

## 2024-05-16 PROCEDURE — 250N000011 HC RX IP 250 OP 636: Performed by: PLASTIC SURGERY

## 2024-05-16 PROCEDURE — G0378 HOSPITAL OBSERVATION PER HR: HCPCS

## 2024-05-16 PROCEDURE — 250N000013 HC RX MED GY IP 250 OP 250 PS 637: Performed by: PLASTIC SURGERY

## 2024-05-16 RX ADMIN — ONDANSETRON 4 MG: 4 TABLET, ORALLY DISINTEGRATING ORAL at 10:38

## 2024-05-16 RX ADMIN — ACETAMINOPHEN 975 MG: 325 TABLET ORAL at 06:29

## 2024-05-16 RX ADMIN — NITROGLYCERIN 15 MG: 20 OINTMENT TOPICAL at 00:06

## 2024-05-16 RX ADMIN — HYDROMORPHONE HYDROCHLORIDE 2 MG: 2 TABLET ORAL at 15:11

## 2024-05-16 RX ADMIN — ACETAMINOPHEN 650 MG: 325 TABLET ORAL at 04:03

## 2024-05-16 RX ADMIN — PROCHLORPERAZINE MALEATE 10 MG: 10 TABLET ORAL at 15:11

## 2024-05-16 RX ADMIN — ONDANSETRON 4 MG: 4 TABLET, ORALLY DISINTEGRATING ORAL at 17:05

## 2024-05-16 RX ADMIN — SENNOSIDES AND DOCUSATE SODIUM 1 TABLET: 8.6; 5 TABLET ORAL at 08:20

## 2024-05-16 RX ADMIN — ACETAMINOPHEN 975 MG: 325 TABLET ORAL at 13:17

## 2024-05-16 RX ADMIN — NITROGLYCERIN 15 MG: 20 OINTMENT TOPICAL at 12:29

## 2024-05-16 RX ADMIN — HYDROMORPHONE HYDROCHLORIDE 2 MG: 2 TABLET ORAL at 08:28

## 2024-05-16 ASSESSMENT — ACTIVITIES OF DAILY LIVING (ADL)
ADLS_ACUITY_SCORE: 34

## 2024-05-16 NOTE — CARE PLAN
PRIMARY Concern: post surgical pain/ishem.  SAFETY RISK Concerns (fall risk, behaviors, etc.): none      Isolation/Type: none  Tests/Procedures for NEXT shift: none  Consults? (Pending/following, signed-off?) surgical  Other Important info for NEXT shift: HA  Anticipated DC date & active delays: today  __________________________________________________________  Orientation/Cognitive: A&Ox4  Observation Goals (Met/ Not Met): met  Mobility Level/Assist Equipment: indpt  Antibiotics & Plan (IV/po, length of tx left): TBD  Pain Management: dilaudid PO  Tele/VS/O2: VSS, RA  ABNL Lab/BG: none  Diet: regular  Bowel/Bladder: cont.  Skin Concerns: surgical sites bilateral breast  Drains/Devices: FARZAD drains 2x

## 2024-05-16 NOTE — CARE PLAN
"PRIMARY DIAGNOSIS: \"GENERIC\" NURSING  OUTPATIENT/OBSERVATION GOALS TO BE MET BEFORE DISCHARGE:  ADLs back to baseline: Yes    Activity and level of assistance: Ambulating independently.    Pain status: Improved-controlled with oral pain medications.    Return to near baseline physical activity: Yes     Discharge Planner Nurse   Safe discharge environment identified: Yes  Barriers to discharge: No       Entered by: Rema Cheng RN 05/16/2024 3:33 PM     Please review provider order for any additional goals.   Nurse to notify provider when observation goals have been met and patient is ready for discharge.  "

## 2024-05-16 NOTE — PROGRESS NOTES
No events overnight.  Patient resting comfortably in bed.  However, she is complaining of headache.    At the physical exam, right nipple-areolar complex still with sign of ischemia at 3:00 and 9 o'clock position.  Unchanged from yesterday's exam.  There is capillary refill less than 2 seconds at 12:00 and 6 o'clock position.  Left nipple areolar complex, viable, with no sign of ischemia and capillary refill less than 2 seconds.  No evidence of hematoma.  No sign of infection.  Overall good shape and symmetry bilaterally.  Bilateral Blaine drain have been removed.    Plan: Patient will be discharged home.  I will not prescribe more Nitropaste because I believe that the effect of the Nitropaste has been achieved and patient is now developing secondary effect of this medication which is severe headache.  Patient is to take showers and wash wound with soap and water and then apply antibiotic ointment over the nipples.  I will follow-up with the patient on May 23 at 4 PM in my clinic.  My personal cell phone number has been provided to the patient for contact.    I have explained to them that we have to wait for mother nature to demarcate the final area of ischemia and necrosis on the right nipple.  I still have the hope that the patient will only develop epidermal lysis on the right nipple.  I did discuss with the patient that if the nipple is lost, she will need tattooing.  She was in agreement with this plan.    Avinash Johnson MD , FACS   Diplomate American Board of Plastic Surgery  Diplomate American Board of Surgery  Adj. Assistant Professor of Surgery  Division of Plastic & Reconstructive Surgery   [facility]  Office: [phone]   5/16/2024 at 5:15 PM

## 2024-05-16 NOTE — PROGRESS NOTES
"PRIMARY DIAGNOSIS: \"GENERIC\" NURSING  OUTPATIENT/OBSERVATION GOALS TO BE MET BEFORE DISCHARGE:  ADLs back to baseline: Yes    Activity and level of assistance: Up with standby assistance.    Pain status: Improved-controlled with oral pain medications. PRN Dilaudid 4mg.    Return to near baseline physical activity: Yes     Discharge Planner Nurse   Safe discharge environment identified: Yes  Barriers to discharge: Yes       Entered by: Vandana Olea RN 05/16/2024 1:08 AM    Pt A&O. VSS in RA. Up with SBA to bathroom. Sugical wounds covered with non adherent pad, secure with kerlix.      Please review provider order for any additional goals.   Nurse to notify provider when observation goals have been met and patient is ready for discharge.  "

## 2024-05-16 NOTE — PLAN OF CARE
Problem: Adult Inpatient Plan of Care  Goal: Optimal Comfort and Wellbeing  Intervention: Monitor Pain and Promote Comfort  Recent Flowsheet Documentation  Taken 5/16/2024 0447 by Vandana Olea RN  Pain Management Interventions: rest  Taken 5/15/2024 0416 by Vandana Olea, RN  Pain Management Interventions:   repositioned   rest   Goal Outcome Evaluation:      Pt A&Ox4. Vitals within baseline. FARZAD drained serosanguineous, recorded see flowsheet. R breast  dressing have small drainage. Gave PRN Dilaudid 4mg oral & Tylenol. SBA to bathroom.     Vandana Olea RN

## 2024-05-16 NOTE — DISCHARGE SUMMARY
Discharge Summary    Patient: Andres Trivedi  MR: [id number]  Date of Admission: 5/13/2024  Date of Discharge: May 16, 2024  Attending: Avinash Johnson MD, FACS  Procedures: Bilateral reduction mammoplasty with emergency interrogation of bilateral nipple areolar complexes with the spy machine.  Emergency suture removal at bedside, of the stitches from bilateral nipple areolar complexes.  Consults: None  Clinical Course: Andres Trivedi is a 39 year old lady who was admitted s/p bilateral reduction mammoplasty.  She did experience intraoperatively sign of ischemia on bilateral nipple areolar complex and I utilized the spy machine during surgery.  I admitted the patient for observation and in less than 24 hours, patient was again developing signs of ischemia on bilateral nipple areolar complexes.  Emergency bedside removal of periareolar stitches was performed and I start applying Nitropaste to both nipple areolar complexes.  There was immediate improvement on the left nipple as far as blood supply.  However, there was sign of persistent ischemia on the right nipple, especially at 3:00 and 9 o'clock position.  After 3 days there were no more sign of pending ischemia on the left nipple.  However there was persistent ischemia on the right nipple, despite the suture removal and the Nitropaste application.  Patient is going to be discharged home and will follow-up with me in 6 days.    Physical findings on discharge:  At the physical exam, right nipple-areolar complex still with sign of ischemia at 3:00 and 9 o'clock position.  Unchanged from yesterday's exam.  There is capillary refill less than 2 seconds at 12:00 and 6 o'clock position.  Left nipple areolar complex, viable, with no sign of ischemia and capillary refill less than 2 seconds.  No evidence of hematoma.  No sign of infection.  Overall good shape and symmetry bilaterally.  Bilateral Blaine drain have been removed.       Discharge Instructions:   Activity:  Ad terri.  Dressings: Antibiotic ointment over bilateral nipple areolar complexes, followed by Telfa dressing Kerlix and an Ace wrap  Drains: None  Restrictions: Avoid heavy lifting, less than 10 pounds, for the next 6 weeks.    Discharge Medications:     Medication List        Started      cephALEXin 500 MG capsule  Commonly known as: KEFLEX  500 mg, Oral, 3 TIMES DAILY     HYDROcodone-acetaminophen 5-325 MG tablet  Commonly known as: NORCO  1-2 tablets, Oral, EVERY 4 HOURS PRN     ondansetron 4 MG ODT tab  Commonly known as: ZOFRAN ODT  4 mg, Oral, EVERY 6 HOURS PRN     senna-docusate 8.6-50 MG tablet  Commonly known as: SENOKOT-S/PERICOLACE  1-2 tablets, Oral, 2 TIMES DAILY              Follow-up:  Patient should follow up with Dr. Johnson at 4 PM at the plastic surgery clinic, [facility], [facility]. [address] on May 23..  Call the clinic at [phone] or plastic surgery nurse at [phone] if any questions.    Avinash Johnson MD , FACS   Diplomate American Board of Plastic Surgery  Diplomate American Board of Surgery  Adj. Assistant Professor of Surgery  Division of Plastic & Reconstructive Surgery   [facility]  Office: [phone]   5/16/2024 at 5:15 PM

## 2024-05-16 NOTE — DISCHARGE INSTRUCTIONS
Discharge Instructions   Routine, Follow-up with Dr. Johnson on May 23 rd, at 4:00 PM, [facility], [place]. [address]. Avoid heavy lifting, less than 10 pounds, for the next 6 weeks. You may take regular showers starting tomorrow and wash all the wounds with soap and water. After you have finished showering, pat dry your wounds and apply Neosporin over your nipples. Then apply a dry gauze and your sports bra. Make sure that your sports bra are not too tight on your breasts. I recommend that you sleep with your sports bra on. After shower, apply dry gauze where the drains used to be. Apply these gauzes over the holes for the next 3 days. After 3 days, do not apply any more gauze and leave wounds open. Please make sure to ambulate, at least every 3-4 hours, in order to prevent blood clots on your legs. Make sure to drink plenty of fluids/Gatorade in the first 3 days to prevent dehydration and blood clots. In order to prevent constipation, make sure to take your stool softeners, and increase your fiber intake with: pineapple chunks, broccoli, prunes and even prune juice. Should you have any questions, contact Dr. Johnson's nurses: Ellen Ramirze or Ashleigh at [phone], Monday - Friday, 8:30 a.m. - 4:30 p.m. After hours and on weekends-holidays, contact Dr. Johnson's call center 24/7 at [phone] and follow the instructions. Otherwise, you can ALWAYS call Dr. Johnson, 24/7, on his mobile number: [phone].

## 2024-05-16 NOTE — PROGRESS NOTES
Patient is discharging today.  Discharge teaching provided to patient and family. Medications from pharmacy through a secure code handed to patient and family. Medication included narcotics.  Teaching on medication provided and all questions answered. Education and care plan resolved.

## 2024-05-16 NOTE — PROGRESS NOTES
Assumed care for this patient at 1900. Bilateral breast reduction. MD removed drain. Plan is to discharge today. Gathered supplies for dressing changes. Waiting on discharge orders from MD

## 2024-05-17 ENCOUNTER — MYC MEDICAL ADVICE (OUTPATIENT)
Dept: PLASTIC SURGERY | Facility: AMBULATORY SURGERY CENTER | Age: 39
End: 2024-05-17
Payer: COMMERCIAL

## 2024-05-17 DIAGNOSIS — Z98.890 S/P BILATERAL BREAST REDUCTION: Primary | ICD-10-CM

## 2024-05-17 LAB
PATH REPORT.COMMENTS IMP SPEC: NORMAL
PATH REPORT.COMMENTS IMP SPEC: NORMAL
PATH REPORT.FINAL DX SPEC: NORMAL
PATH REPORT.GROSS SPEC: NORMAL
PATH REPORT.MICROSCOPIC SPEC OTHER STN: NORMAL
PATH REPORT.RELEVANT HX SPEC: NORMAL
PHOTO IMAGE: NORMAL

## 2024-05-20 PROBLEM — N64.59 NIPPLE PROBLEM: Status: ACTIVE | Noted: 2024-03-22

## 2024-05-20 RX ORDER — OXYCODONE AND ACETAMINOPHEN 5; 325 MG/1; MG/1
1 TABLET ORAL EVERY 6 HOURS PRN
Qty: 21 TABLET | Refills: 0 | Status: SHIPPED | OUTPATIENT
Start: 2024-05-20 | End: 2024-05-25

## 2024-05-20 NOTE — TELEPHONE ENCOUNTER
Patient requesting more pain medication.  She has right nipple areolar complex ischemia/necrosis.  I think a refill is appropriate.    Avinash Johnson MD , FACS   Diplomate American Board of Plastic Surgery  Diplomate American Board of Surgery  Adj. Assistant Professor of Surgery  Division of Plastic & Reconstructive Surgery   [facility]  Office: [phone]   5/20/2024 at 3:41 PM

## 2024-05-21 ENCOUNTER — DOCUMENTATION ONLY (OUTPATIENT)
Dept: PLASTIC SURGERY | Facility: CLINIC | Age: 39
End: 2024-05-21
Payer: COMMERCIAL

## 2024-05-21 NOTE — PROGRESS NOTES
Call placed to [facility] to verify receipt of order. Per Shadi the order has been received and is being worked on.     Ashley Vega RN on 5/21/2024 at 10:13 AM

## 2024-05-21 NOTE — PROGRESS NOTES
Order for wound vac faxed to [phone]. Order form was faxed along with demographics, H&P, Op Notes, Progress notes x3 and discharge summary.     Wound vac is needed by Friday 05/24/24 at 0900 for use in the OR on Tuesday by Dr. Johnson. Will continue to check on status throughout the day.     Ashley Vega RN on 5/21/2024 at 8:38 AM

## 2024-05-22 ENCOUNTER — MYC MEDICAL ADVICE (OUTPATIENT)
Dept: PLASTIC SURGERY | Facility: AMBULATORY SURGERY CENTER | Age: 39
End: 2024-05-22
Payer: COMMERCIAL

## 2024-05-22 ENCOUNTER — DOCUMENTATION ONLY (OUTPATIENT)
Dept: OTHER | Facility: CLINIC | Age: 39
End: 2024-05-22
Payer: COMMERCIAL

## 2024-05-22 NOTE — PROGRESS NOTES
Call placed to [facility] beds at the [facility] to again check status of wound vac order. Per Do the representative I was able to speak with who states the order is still pending. I have requested a call from Lisa who is working the case for a status update as no additional documentation or information has been requested and we need the vac by Friday morning.     Ashley Vega RN on 5/22/2024 at 2:04 PM

## 2024-05-22 NOTE — PROGRESS NOTES
Call placed to [facility] to verify receipt of order. Per Lisa Ponce is working on the order still at this time no additional information is needed.     Reference #: [id number]    Aslhey Vega RN on 5/22/2024 at 8:23 AM

## 2024-05-23 ENCOUNTER — OFFICE VISIT (OUTPATIENT)
Dept: PLASTIC SURGERY | Facility: AMBULATORY SURGERY CENTER | Age: 39
End: 2024-05-23
Payer: COMMERCIAL

## 2024-05-23 VITALS — HEIGHT: 62 IN | WEIGHT: 202 LBS | BODY MASS INDEX: 37.17 KG/M2

## 2024-05-23 DIAGNOSIS — Z98.890 S/P BILATERAL BREAST REDUCTION: Primary | ICD-10-CM

## 2024-05-23 PROCEDURE — 99207 PR NO CHARGE LOS: CPT | Performed by: PLASTIC SURGERY

## 2024-05-23 RX ORDER — IBUPROFEN 800 MG/1
800 TABLET, FILM COATED ORAL EVERY 6 HOURS PRN
Qty: 30 TABLET | Refills: 0 | Status: SHIPPED | OUTPATIENT
Start: 2024-05-23 | End: 2024-07-26

## 2024-05-23 NOTE — LETTER
[facility] [place]  [address], [address]  [place] [address]  [phone]          May 23, 2024    RE:  Andres Trivedi                                                                                                                                                       [address] [address] [address]            To whom it may concern:    Andres Trivedi is under my professional care for a surgical procedure on 05/13/2024. Due to a complication she will be having a secondary surgery on 05/28/2024. She is unable to return to work at this time. We are estimating a possible return to work of 06/10/2024. We will re-evaluate this date at a post-op closer to 06/10/2024.    Please excuse Andres at this time.         Sincerely,        Avinash Johnson MD

## 2024-05-23 NOTE — NURSING NOTE
FMLA / NANCY or Short Term Disability Paperwork completed and signed on providers behalf.     Leave start date: 05/13/2024    Leave end date: 06/10/2024 with possible extension      Forms faxed to: Rajinder at [facility], at [phone].    Forms labeled and sent to HIM for scanning.    Ashley Vega RN on 5/23/2024 at 3:37 PM

## 2024-05-23 NOTE — PROGRESS NOTES
Call placed to Lisa at [facility] for status update on wound vac.     Per Lisa the order was released yesterday but they were attempting to ship it to the patient. I discussed that the order was to be shipped to our clinic and that was stated 3x on the order form. Lisa will work on this update and call once completed.   They have been informed the vac is needed by tomorrow morning 05/24/2024 at 0900.    Ashley Vega RN on 5/23/2024 at 9:16 AM

## 2024-05-23 NOTE — LETTER
5/23/2024         RE: Andres Trivedi  [address] [address]        Dear Colleague,    Thank you for referring your patient, Andres Trivedi, to the [facility] [place]. Please see a copy of my visit note below.    Andres is a 39 years old lady status post bilateral breast reduction.  She is 10 days out from surgery.  Patient did develop bilateral nipple areolar complex ischemia that required emergent periareolar stitches release at the bedside.  The left breast nipple-areolar complex have survive.  However, the right breast nipple-areolar complex progressed to full thickness ischemia.  Patient returns today for reevaluation.    At the physical exam, patient present with good shape and symmetry.  Soriano pattern incisions are healing well with no sign of infection or dehiscence.  There is no evidence of hematoma or seroma.  There is no evidence of surgical site infection.  The right breast nipple areolar complex has progressed to full-thickness necrosis.  The left breast nipple is viable with capillary refill less than 2 seconds.                Plan: Patient will be taken to the operating room next Tuesday for irrigation and debridement of this right breast nipple necrosis and application of wound VAC therapy.    Once the wound has healed, she will have 3D tattooing and possible nipple reconstruction (if she so desires).    All questions has been answered.  Patient is ready for surgery.    Avinash Johnson MD , FACS   Diplomate American Board of Plastic Surgery  Diplomate American Board of Surgery  Adj. Assistant Professor of Surgery  Division of Plastic & Reconstructive Surgery   [facility]  Office: [phone]   5/23/2024 at 4:38 PM     Chief complaint:  Right breast nipple areolar complex necrosis    History of present illness:  This is a 39 year old lady who presents with right breast nipple areola complex necrosis after  bilateral breast reduction 10 days ago.    She will be taken to the operating room for debridement of this necrotic nipple areolar complex (NAC) and application of wound VAC therapy.    Past medical history:  Past Medical History:   Diagnosis Date     Chronic bilateral thoracic back pain      Depressive disorder 2019    Was on Lexapro for 2 years. No current medications.     Macromastia      Neck pain      Obesity      Transfusion of blood product declined due to Congregation reason        Past surgical history:  Bilateral breast reduction    Allergies:  No known drug allergies    Medications:    Current Outpatient Medications:      HYDROcodone-acetaminophen (NORCO) 5-325 MG tablet, Take 1-2 tablets by mouth every 4 hours as needed for moderate to severe pain, Disp: 30 tablet, Rfl: 0     ibuprofen (ADVIL/MOTRIN) 800 MG tablet, Take 1 tablet (800 mg) by mouth every 6 hours as needed for moderate pain, Disp: 30 tablet, Rfl: 0     levonorgestrel (MIRENA) 20 MCG/DAY IUD, 1 each (20 mcg) by Intrauterine route once, Disp: , Rfl:      ondansetron (ZOFRAN ODT) 4 MG ODT tab, Take 1 tablet (4 mg) by mouth every 6 hours as needed for nausea, Disp: 16 tablet, Rfl: 0     oxyCODONE-acetaminophen (PERCOCET) 5-325 MG tablet, Take 1 tablet by mouth every 6 hours as needed for pain, Disp: 21 tablet, Rfl: 0     phentermine 15 MG capsule, Take 1 capsule (15 mg) by mouth every morning Due for visit May 2024., Disp: 30 capsule, Rfl: 0     senna-docusate (SENOKOT-S/PERICOLACE) 8.6-50 MG tablet, Take 1-2 tablets by mouth 2 times daily, Disp: 30 tablet, Rfl: 0    Family history:  Noncontributory    Social History:  Denies tobacco, drinks alcohol socially    Review of systems:  General ROS: No complaints or constitutional symptoms  Skin: No complaints or symptoms   Hematologic/Lymphatic: No symptoms or complaints  Psychiatric: No symptoms or complaints  Endocrine: No excessive fatigue, no hypermetabolic symptoms reported  Respiratory ROS: No  "cough, shortness of breath, or wheezing  Cardiovascular ROS: No chest pain or dyspnea on exertion  Breast ROS: Necrosis of the right breast NAC  Gastrointestinal ROS: No abdominal pain, nausea, diarrhea, or constipation  Musculoskeletal ROS: No recent injuries reported  Neurological ROS: No focal neurologic defects reported.      Physical exam:  Ht 1.575 m (5' 2\")   Wt 91.6 kg (202 lb)   LMP  (Approximate)   BMI 36.95 kg/m    General: Alert, cooperative, appears stated age   Skin: Skin color, texture, turgor normal, no rashes or lesions   Lymphatic: No obvious adenopathy, no swelling   Eyes: No scleral icterus, pupils equal  HENT: No traumatic injury to the head or face, no gross abnormalities  Lungs: Normal respiratory effort, breath sounds equal bilaterally  Heart: Regular rate and rhythm  Breasts: patient presents with good shape and symmetry bilaterally. Soriano pattern incisions are healing well with no sign of infection or dehiscence. There is no evidence of hematoma or seroma. There is no evidence of surgical site infection. The right breast nipple areolar complex has progressed to full-thickness necrosis. The left breast nipple is viable with capillary refill less than 2 seconds.   Abdomen: Soft, non-distended and non-tender to palpation  Neurologic: Grossly intact                      ASSESSMENT:    This is a 39 year old lady with right breast NAC necrosis after bilateral breast reduction.      PLAN:   Patient will be taken to the operating room next Tuesday for irrigation and debridement of this right breast nipple necrosis and application of wound VAC therapy.    I will also close the rest of the areola on the left breast.     Once the wound has healed, she will have 3D tattooing and possible nipple reconstruction (if she so desires).    Avinash Johnson MD, FACS   Diplomate American Board of Plastic Surgery  Diplomate American Board of Surgery  [facility]  Division of Plastic & " Reconstructive Surgery  Office: [phone]   5/23/2024 at 4:39 PM      Again, thank you for allowing me to participate in the care of your patient.        Sincerely,        Avinash Johnson MD

## 2024-05-23 NOTE — PROGRESS NOTES
Andres is a 39 years old lady status post bilateral breast reduction.  She is 10 days out from surgery.  Patient did develop bilateral nipple areolar complex ischemia that required emergent periareolar stitches release at the bedside.  The left breast nipple-areolar complex have survive.  However, the right breast nipple-areolar complex progressed to full thickness ischemia.  Patient returns today for reevaluation.    At the physical exam, patient present with good shape and symmetry.  Soriano pattern incisions are healing well with no sign of infection or dehiscence.  There is no evidence of hematoma or seroma.  There is no evidence of surgical site infection.  The right breast nipple areolar complex has progressed to full-thickness necrosis.  The left breast nipple is viable with capillary refill less than 2 seconds.                Plan: Patient will be taken to the operating room next Tuesday for irrigation and debridement of this right breast nipple necrosis and application of wound VAC therapy.    Once the wound has healed, she will have 3D tattooing and possible nipple reconstruction (if she so desires).    All questions has been answered.  Patient is ready for surgery.    Avinash Johnson MD , FACS   Diplomate American Board of Plastic Surgery  Diplomate American Board of Surgery  Adj. Assistant Professor of Surgery  Division of Plastic & Reconstructive Surgery   [facility]  Office: [phone]   5/23/2024 at 4:38 PM

## 2024-05-23 NOTE — PROGRESS NOTES
Chief complaint:  Right breast nipple areolar complex necrosis    History of present illness:  This is a 39 year old lady who presents with right breast nipple areola complex necrosis after bilateral breast reduction 10 days ago.    She will be taken to the operating room for debridement of this necrotic nipple areolar complex (NAC) and application of wound VAC therapy.    Past medical history:  Past Medical History:   Diagnosis Date    Chronic bilateral thoracic back pain     Depressive disorder 2019    Was on Lexapro for 2 years. No current medications.    Macromastia     Neck pain     Obesity     Transfusion of blood product declined due to Jainism reason        Past surgical history:  Bilateral breast reduction    Allergies:  No known drug allergies    Medications:    Current Outpatient Medications:     HYDROcodone-acetaminophen (NORCO) 5-325 MG tablet, Take 1-2 tablets by mouth every 4 hours as needed for moderate to severe pain, Disp: 30 tablet, Rfl: 0    ibuprofen (ADVIL/MOTRIN) 800 MG tablet, Take 1 tablet (800 mg) by mouth every 6 hours as needed for moderate pain, Disp: 30 tablet, Rfl: 0    levonorgestrel (MIRENA) 20 MCG/DAY IUD, 1 each (20 mcg) by Intrauterine route once, Disp: , Rfl:     ondansetron (ZOFRAN ODT) 4 MG ODT tab, Take 1 tablet (4 mg) by mouth every 6 hours as needed for nausea, Disp: 16 tablet, Rfl: 0    oxyCODONE-acetaminophen (PERCOCET) 5-325 MG tablet, Take 1 tablet by mouth every 6 hours as needed for pain, Disp: 21 tablet, Rfl: 0    phentermine 15 MG capsule, Take 1 capsule (15 mg) by mouth every morning Due for visit May 2024., Disp: 30 capsule, Rfl: 0    senna-docusate (SENOKOT-S/PERICOLACE) 8.6-50 MG tablet, Take 1-2 tablets by mouth 2 times daily, Disp: 30 tablet, Rfl: 0    Family history:  Noncontributory    Social History:  Denies tobacco, drinks alcohol socially    Review of systems:  General ROS: No complaints or constitutional symptoms  Skin: No complaints or symptoms  "  Hematologic/Lymphatic: No symptoms or complaints  Psychiatric: No symptoms or complaints  Endocrine: No excessive fatigue, no hypermetabolic symptoms reported  Respiratory ROS: No cough, shortness of breath, or wheezing  Cardiovascular ROS: No chest pain or dyspnea on exertion  Breast ROS: Necrosis of the right breast NAC  Gastrointestinal ROS: No abdominal pain, nausea, diarrhea, or constipation  Musculoskeletal ROS: No recent injuries reported  Neurological ROS: No focal neurologic defects reported.      Physical exam:  Ht 1.575 m (5' 2\")   Wt 91.6 kg (202 lb)   LMP  (Approximate)   BMI 36.95 kg/m    General: Alert, cooperative, appears stated age   Skin: Skin color, texture, turgor normal, no rashes or lesions   Lymphatic: No obvious adenopathy, no swelling   Eyes: No scleral icterus, pupils equal  HENT: No traumatic injury to the head or face, no gross abnormalities  Lungs: Normal respiratory effort, breath sounds equal bilaterally  Heart: Regular rate and rhythm  Breasts: patient presents with good shape and symmetry bilaterally. Soriano pattern incisions are healing well with no sign of infection or dehiscence. There is no evidence of hematoma or seroma. There is no evidence of surgical site infection. The right breast nipple areolar complex has progressed to full-thickness necrosis. The left breast nipple is viable with capillary refill less than 2 seconds.   Abdomen: Soft, non-distended and non-tender to palpation  Neurologic: Grossly intact                      ASSESSMENT:    This is a 39 year old lady with right breast NAC necrosis after bilateral breast reduction.      PLAN:   Patient will be taken to the operating room next Tuesday for irrigation and debridement of this right breast nipple necrosis and application of wound VAC therapy.    I will also close the rest of the areola on the left breast.     Once the wound has healed, she will have 3D tattooing and possible nipple reconstruction (if she so " desires).    Avinash Johnson MD, FACS   Diplomate American Board of Plastic Surgery  Diplomate American Board of Surgery  [facility]  Division of Plastic & Reconstructive Surgery  Office: [phone]   5/23/2024 at 4:39 PM

## 2024-05-23 NOTE — H&P (VIEW-ONLY)
Chief complaint:  Right breast nipple areolar complex necrosis    History of present illness:  This is a 39 year old lady who presents with right breast nipple areola complex necrosis after bilateral breast reduction 10 days ago.    She will be taken to the operating room for debridement of this necrotic nipple areolar complex (NAC) and application of wound VAC therapy.    Past medical history:  Past Medical History:   Diagnosis Date    Chronic bilateral thoracic back pain     Depressive disorder 2019    Was on Lexapro for 2 years. No current medications.    Macromastia     Neck pain     Obesity     Transfusion of blood product declined due to Mandaen reason        Past surgical history:  Bilateral breast reduction    Allergies:  No known drug allergies    Medications:    Current Outpatient Medications:     HYDROcodone-acetaminophen (NORCO) 5-325 MG tablet, Take 1-2 tablets by mouth every 4 hours as needed for moderate to severe pain, Disp: 30 tablet, Rfl: 0    ibuprofen (ADVIL/MOTRIN) 800 MG tablet, Take 1 tablet (800 mg) by mouth every 6 hours as needed for moderate pain, Disp: 30 tablet, Rfl: 0    levonorgestrel (MIRENA) 20 MCG/DAY IUD, 1 each (20 mcg) by Intrauterine route once, Disp: , Rfl:     ondansetron (ZOFRAN ODT) 4 MG ODT tab, Take 1 tablet (4 mg) by mouth every 6 hours as needed for nausea, Disp: 16 tablet, Rfl: 0    oxyCODONE-acetaminophen (PERCOCET) 5-325 MG tablet, Take 1 tablet by mouth every 6 hours as needed for pain, Disp: 21 tablet, Rfl: 0    phentermine 15 MG capsule, Take 1 capsule (15 mg) by mouth every morning Due for visit May 2024., Disp: 30 capsule, Rfl: 0    senna-docusate (SENOKOT-S/PERICOLACE) 8.6-50 MG tablet, Take 1-2 tablets by mouth 2 times daily, Disp: 30 tablet, Rfl: 0    Family history:  Noncontributory    Social History:  Denies tobacco, drinks alcohol socially    Review of systems:  General ROS: No complaints or constitutional symptoms  Skin: No complaints or symptoms  "  Hematologic/Lymphatic: No symptoms or complaints  Psychiatric: No symptoms or complaints  Endocrine: No excessive fatigue, no hypermetabolic symptoms reported  Respiratory ROS: No cough, shortness of breath, or wheezing  Cardiovascular ROS: No chest pain or dyspnea on exertion  Breast ROS: Necrosis of the right breast NAC  Gastrointestinal ROS: No abdominal pain, nausea, diarrhea, or constipation  Musculoskeletal ROS: No recent injuries reported  Neurological ROS: No focal neurologic defects reported.      Physical exam:  Ht 1.575 m (5' 2\")   Wt 91.6 kg (202 lb)   LMP  (Approximate)   BMI 36.95 kg/m    General: Alert, cooperative, appears stated age   Skin: Skin color, texture, turgor normal, no rashes or lesions   Lymphatic: No obvious adenopathy, no swelling   Eyes: No scleral icterus, pupils equal  HENT: No traumatic injury to the head or face, no gross abnormalities  Lungs: Normal respiratory effort, breath sounds equal bilaterally  Heart: Regular rate and rhythm  Breasts: patient presents with good shape and symmetry bilaterally. Soriano pattern incisions are healing well with no sign of infection or dehiscence. There is no evidence of hematoma or seroma. There is no evidence of surgical site infection. The right breast nipple areolar complex has progressed to full-thickness necrosis. The left breast nipple is viable with capillary refill less than 2 seconds.   Abdomen: Soft, non-distended and non-tender to palpation  Neurologic: Grossly intact                      ASSESSMENT:    This is a 39 year old lady with right breast NAC necrosis after bilateral breast reduction.      PLAN:   Patient will be taken to the operating room next Tuesday for irrigation and debridement of this right breast nipple necrosis and application of wound VAC therapy.    I will also close the rest of the areola on the left breast.     Once the wound has healed, she will have 3D tattooing and possible nipple reconstruction (if she so " desires).    Avinash Johnson MD, FACS   Diplomate American Board of Plastic Surgery  Diplomate American Board of Surgery  [facility]  Division of Plastic & Reconstructive Surgery  Office: [phone]   5/23/2024 at 4:39 PM

## 2024-05-28 ENCOUNTER — ANESTHESIA (OUTPATIENT)
Dept: SURGERY | Facility: HOSPITAL | Age: 39
End: 2024-05-28
Payer: COMMERCIAL

## 2024-05-28 ENCOUNTER — ANESTHESIA EVENT (OUTPATIENT)
Dept: SURGERY | Facility: HOSPITAL | Age: 39
End: 2024-05-28
Payer: COMMERCIAL

## 2024-05-28 ENCOUNTER — HOSPITAL ENCOUNTER (OUTPATIENT)
Facility: HOSPITAL | Age: 39
Discharge: HOME | End: 2024-05-28
Attending: PLASTIC SURGERY | Admitting: PLASTIC SURGERY
Payer: COMMERCIAL

## 2024-05-28 VITALS
DIASTOLIC BLOOD PRESSURE: 59 MMHG | BODY MASS INDEX: 35.65 KG/M2 | SYSTOLIC BLOOD PRESSURE: 109 MMHG | OXYGEN SATURATION: 94 % | TEMPERATURE: 97.1 F | WEIGHT: 194.9 LBS | HEART RATE: 99 BPM | RESPIRATION RATE: 24 BRPM

## 2024-05-28 DIAGNOSIS — N64.59 NIPPLE PROBLEM: Primary | ICD-10-CM

## 2024-05-28 PROCEDURE — 710N000009 HC RECOVERY PHASE 1, LEVEL 1, PER MIN: Performed by: PLASTIC SURGERY

## 2024-05-28 PROCEDURE — 250N000011 HC RX IP 250 OP 636: Performed by: ANESTHESIOLOGY

## 2024-05-28 PROCEDURE — 370N000017 HC ANESTHESIA TECHNICAL FEE, PER MIN: Performed by: PLASTIC SURGERY

## 2024-05-28 PROCEDURE — 250N000009 HC RX 250: Performed by: PLASTIC SURGERY

## 2024-05-28 PROCEDURE — 11042 DBRDMT SUBQ TIS 1ST 20SQCM/<: CPT | Mod: 78 | Performed by: PLASTIC SURGERY

## 2024-05-28 PROCEDURE — 999N000141 HC STATISTIC PRE-PROCEDURE NURSING ASSESSMENT: Performed by: PLASTIC SURGERY

## 2024-05-28 PROCEDURE — 258N000003 HC RX IP 258 OP 636: Performed by: ANESTHESIOLOGY

## 2024-05-28 PROCEDURE — 250N000011 HC RX IP 250 OP 636: Performed by: PLASTIC SURGERY

## 2024-05-28 PROCEDURE — 272N000001 HC OR GENERAL SUPPLY STERILE: Performed by: PLASTIC SURGERY

## 2024-05-28 PROCEDURE — 360N000075 HC SURGERY LEVEL 2, PER MIN: Performed by: PLASTIC SURGERY

## 2024-05-28 PROCEDURE — 250N000013 HC RX MED GY IP 250 OP 250 PS 637: Performed by: ANESTHESIOLOGY

## 2024-05-28 PROCEDURE — 97605 NEG PRS WND THER DME<=50SQCM: CPT | Mod: 78 | Performed by: PLASTIC SURGERY

## 2024-05-28 PROCEDURE — 250N000009 HC RX 250: Performed by: ANESTHESIOLOGY

## 2024-05-28 PROCEDURE — 12004 RPR S/N/AX/GEN/TRK7.6-12.5CM: CPT | Mod: 78 | Performed by: PLASTIC SURGERY

## 2024-05-28 PROCEDURE — 710N000012 HC RECOVERY PHASE 2, PER MINUTE: Performed by: PLASTIC SURGERY

## 2024-05-28 PROCEDURE — 258N000003 HC RX IP 258 OP 636: Performed by: PLASTIC SURGERY

## 2024-05-28 RX ORDER — HYDROMORPHONE HCL IN WATER/PF 6 MG/30 ML
0.2 PATIENT CONTROLLED ANALGESIA SYRINGE INTRAVENOUS EVERY 5 MIN PRN
Status: DISCONTINUED | OUTPATIENT
Start: 2024-05-28 | End: 2024-05-28 | Stop reason: HOSPADM

## 2024-05-28 RX ORDER — DEXAMETHASONE SODIUM PHOSPHATE 10 MG/ML
4 INJECTION, SOLUTION INTRAMUSCULAR; INTRAVENOUS
Status: DISCONTINUED | OUTPATIENT
Start: 2024-05-28 | End: 2024-05-28 | Stop reason: HOSPADM

## 2024-05-28 RX ORDER — ONDANSETRON 2 MG/ML
4 INJECTION INTRAMUSCULAR; INTRAVENOUS EVERY 30 MIN PRN
Status: DISCONTINUED | OUTPATIENT
Start: 2024-05-28 | End: 2024-05-28 | Stop reason: HOSPADM

## 2024-05-28 RX ORDER — OXYCODONE AND ACETAMINOPHEN 5; 325 MG/1; MG/1
1 TABLET ORAL EVERY 6 HOURS PRN
COMMUNITY
End: 2024-05-31

## 2024-05-28 RX ORDER — DEXAMETHASONE SODIUM PHOSPHATE 4 MG/ML
4 INJECTION, SOLUTION INTRA-ARTICULAR; INTRALESIONAL; INTRAMUSCULAR; INTRAVENOUS; SOFT TISSUE
Status: DISCONTINUED | OUTPATIENT
Start: 2024-05-28 | End: 2024-05-28 | Stop reason: HOSPADM

## 2024-05-28 RX ORDER — LIDOCAINE 40 MG/G
CREAM TOPICAL
Status: DISCONTINUED | OUTPATIENT
Start: 2024-05-28 | End: 2024-05-28 | Stop reason: HOSPADM

## 2024-05-28 RX ORDER — KETAMINE HYDROCHLORIDE 10 MG/ML
INJECTION INTRAMUSCULAR; INTRAVENOUS PRN
Status: DISCONTINUED | OUTPATIENT
Start: 2024-05-28 | End: 2024-05-28

## 2024-05-28 RX ORDER — CEFAZOLIN SODIUM/WATER 2 G/20 ML
2 SYRINGE (ML) INTRAVENOUS
Status: DISCONTINUED | OUTPATIENT
Start: 2024-05-28 | End: 2024-05-28 | Stop reason: HOSPADM

## 2024-05-28 RX ORDER — SODIUM CHLORIDE, SODIUM LACTATE, POTASSIUM CHLORIDE, CALCIUM CHLORIDE 600; 310; 30; 20 MG/100ML; MG/100ML; MG/100ML; MG/100ML
INJECTION, SOLUTION INTRAVENOUS CONTINUOUS
Status: DISCONTINUED | OUTPATIENT
Start: 2024-05-28 | End: 2024-05-28 | Stop reason: HOSPADM

## 2024-05-28 RX ORDER — CEPHALEXIN 500 MG/1
500 CAPSULE ORAL 3 TIMES DAILY
Qty: 9 CAPSULE | Refills: 0 | Status: SHIPPED | OUTPATIENT
Start: 2024-05-28 | End: 2024-05-31

## 2024-05-28 RX ORDER — MULTIVIT WITH MINERALS/LUTEIN
1000 TABLET ORAL DAILY
COMMUNITY

## 2024-05-28 RX ORDER — DEXAMETHASONE SODIUM PHOSPHATE 10 MG/ML
INJECTION, SOLUTION INTRAMUSCULAR; INTRAVENOUS PRN
Status: DISCONTINUED | OUTPATIENT
Start: 2024-05-28 | End: 2024-05-28

## 2024-05-28 RX ORDER — PROPOFOL 10 MG/ML
INJECTION, EMULSION INTRAVENOUS PRN
Status: DISCONTINUED | OUTPATIENT
Start: 2024-05-28 | End: 2024-05-28

## 2024-05-28 RX ORDER — HYDROMORPHONE HYDROCHLORIDE 2 MG/1
2-4 TABLET ORAL EVERY 4 HOURS PRN
Qty: 18 TABLET | Refills: 0 | Status: SHIPPED | OUTPATIENT
Start: 2024-05-28 | End: 2024-06-04

## 2024-05-28 RX ORDER — PROPOFOL 10 MG/ML
INJECTION, EMULSION INTRAVENOUS CONTINUOUS PRN
Status: DISCONTINUED | OUTPATIENT
Start: 2024-05-28 | End: 2024-05-28

## 2024-05-28 RX ORDER — ONDANSETRON 4 MG/1
4 TABLET, ORALLY DISINTEGRATING ORAL EVERY 30 MIN PRN
Status: DISCONTINUED | OUTPATIENT
Start: 2024-05-28 | End: 2024-05-28 | Stop reason: HOSPADM

## 2024-05-28 RX ORDER — FENTANYL CITRATE 50 UG/ML
50 INJECTION, SOLUTION INTRAMUSCULAR; INTRAVENOUS EVERY 5 MIN PRN
Status: DISCONTINUED | OUTPATIENT
Start: 2024-05-28 | End: 2024-05-28 | Stop reason: HOSPADM

## 2024-05-28 RX ORDER — OXYCODONE HYDROCHLORIDE 5 MG/1
10 TABLET ORAL
Status: DISCONTINUED | OUTPATIENT
Start: 2024-05-28 | End: 2024-05-28 | Stop reason: HOSPADM

## 2024-05-28 RX ORDER — AMOXICILLIN 250 MG
1-2 CAPSULE ORAL 2 TIMES DAILY
Qty: 30 TABLET | Refills: 0 | Status: SHIPPED | OUTPATIENT
Start: 2024-05-28 | End: 2024-06-18

## 2024-05-28 RX ORDER — HYDROMORPHONE HCL IN WATER/PF 6 MG/30 ML
0.4 PATIENT CONTROLLED ANALGESIA SYRINGE INTRAVENOUS EVERY 5 MIN PRN
Status: DISCONTINUED | OUTPATIENT
Start: 2024-05-28 | End: 2024-05-28 | Stop reason: HOSPADM

## 2024-05-28 RX ORDER — OXYCODONE HYDROCHLORIDE 5 MG/1
5 TABLET ORAL
Status: COMPLETED | OUTPATIENT
Start: 2024-05-28 | End: 2024-05-28

## 2024-05-28 RX ORDER — ONDANSETRON 2 MG/ML
INJECTION INTRAMUSCULAR; INTRAVENOUS PRN
Status: DISCONTINUED | OUTPATIENT
Start: 2024-05-28 | End: 2024-05-28

## 2024-05-28 RX ORDER — NALOXONE HYDROCHLORIDE 0.4 MG/ML
0.1 INJECTION, SOLUTION INTRAMUSCULAR; INTRAVENOUS; SUBCUTANEOUS
Status: DISCONTINUED | OUTPATIENT
Start: 2024-05-28 | End: 2024-05-28 | Stop reason: HOSPADM

## 2024-05-28 RX ORDER — CEFAZOLIN SODIUM/WATER 2 G/20 ML
2 SYRINGE (ML) INTRAVENOUS
Status: COMPLETED | OUTPATIENT
Start: 2024-05-28 | End: 2024-05-28

## 2024-05-28 RX ORDER — GINSENG 100 MG
CAPSULE ORAL PRN
Status: DISCONTINUED | OUTPATIENT
Start: 2024-05-28 | End: 2024-05-28 | Stop reason: HOSPADM

## 2024-05-28 RX ORDER — ONDANSETRON 4 MG/1
4 TABLET, ORALLY DISINTEGRATING ORAL EVERY 6 HOURS PRN
Qty: 16 TABLET | Refills: 0 | Status: SHIPPED | OUTPATIENT
Start: 2024-05-28 | End: 2024-05-31

## 2024-05-28 RX ORDER — FENTANYL CITRATE 50 UG/ML
INJECTION, SOLUTION INTRAMUSCULAR; INTRAVENOUS PRN
Status: DISCONTINUED | OUTPATIENT
Start: 2024-05-28 | End: 2024-05-28

## 2024-05-28 RX ORDER — FENTANYL CITRATE 50 UG/ML
25 INJECTION, SOLUTION INTRAMUSCULAR; INTRAVENOUS EVERY 5 MIN PRN
Status: DISCONTINUED | OUTPATIENT
Start: 2024-05-28 | End: 2024-05-28 | Stop reason: HOSPADM

## 2024-05-28 RX ORDER — LIDOCAINE HYDROCHLORIDE 10 MG/ML
INJECTION, SOLUTION INFILTRATION; PERINEURAL PRN
Status: DISCONTINUED | OUTPATIENT
Start: 2024-05-28 | End: 2024-05-28

## 2024-05-28 RX ADMIN — FENTANYL CITRATE 25 MCG: 50 INJECTION, SOLUTION INTRAMUSCULAR; INTRAVENOUS at 11:51

## 2024-05-28 RX ADMIN — ONDANSETRON 4 MG: 2 INJECTION INTRAMUSCULAR; INTRAVENOUS at 10:52

## 2024-05-28 RX ADMIN — KETAMINE HYDROCHLORIDE 30 MG: 10 INJECTION INTRAMUSCULAR; INTRAVENOUS at 10:05

## 2024-05-28 RX ADMIN — PROPOFOL 150 MCG/KG/MIN: 10 INJECTION, EMULSION INTRAVENOUS at 10:03

## 2024-05-28 RX ADMIN — OXYCODONE HYDROCHLORIDE 5 MG: 5 TABLET ORAL at 13:00

## 2024-05-28 RX ADMIN — SODIUM CHLORIDE, POTASSIUM CHLORIDE, SODIUM LACTATE AND CALCIUM CHLORIDE: 600; 310; 30; 20 INJECTION, SOLUTION INTRAVENOUS at 12:25

## 2024-05-28 RX ADMIN — DEXAMETHASONE SODIUM PHOSPHATE 4 MG: 10 INJECTION, SOLUTION INTRAMUSCULAR; INTRAVENOUS at 10:09

## 2024-05-28 RX ADMIN — Medication 2 G: at 09:53

## 2024-05-28 RX ADMIN — SODIUM CHLORIDE, POTASSIUM CHLORIDE, SODIUM LACTATE AND CALCIUM CHLORIDE: 600; 310; 30; 20 INJECTION, SOLUTION INTRAVENOUS at 09:07

## 2024-05-28 RX ADMIN — LIDOCAINE HYDROCHLORIDE 50 MG: 10 INJECTION, SOLUTION INFILTRATION; PERINEURAL at 10:03

## 2024-05-28 RX ADMIN — PROPOFOL 200 MG: 10 INJECTION, EMULSION INTRAVENOUS at 10:03

## 2024-05-28 RX ADMIN — FENTANYL CITRATE 25 MCG: 50 INJECTION, SOLUTION INTRAMUSCULAR; INTRAVENOUS at 11:45

## 2024-05-28 RX ADMIN — MIDAZOLAM 2 MG: 1 INJECTION INTRAMUSCULAR; INTRAVENOUS at 09:53

## 2024-05-28 RX ADMIN — FENTANYL CITRATE 50 MCG: 50 INJECTION, SOLUTION INTRAMUSCULAR; INTRAVENOUS at 11:36

## 2024-05-28 RX ADMIN — PROPOFOL 100 MG: 10 INJECTION, EMULSION INTRAVENOUS at 10:04

## 2024-05-28 RX ADMIN — FENTANYL CITRATE 100 MCG: 50 INJECTION INTRAMUSCULAR; INTRAVENOUS at 10:03

## 2024-05-28 ASSESSMENT — ACTIVITIES OF DAILY LIVING (ADL)
ADLS_ACUITY_SCORE: 21

## 2024-05-28 NOTE — ANESTHESIA PREPROCEDURE EVALUATION
Anesthesia Pre-Procedure Evaluation    Patient: Andres Trivedi   MRN: [id number] : 1985        Procedure : Procedure(s):  IRRIGATION AND DEBRIDEMENT, WITH  APPLICATION OF WOUND VACUUM RIGHT NIPPLE AREOLA COMPLEX          Past Medical History:   Diagnosis Date    Chronic bilateral thoracic back pain     Depressive disorder 2019    Was on Lexapro for 2 years. No current medications.    Macromastia     Neck pain     Obesity     Transfusion of blood product declined due to Congregation reason       Past Surgical History:   Procedure Laterality Date    MAMMOPLASTY REDUCTION Bilateral 2024    Procedure: MAMMOPLASTY, REDUCTION BILATERAL;  Surgeon: Avinash Johnson MD;  Location: [facility] Main OR      No Known Allergies   Social History     Tobacco Use    Smoking status: Never     Passive exposure: Never    Smokeless tobacco: Never    Tobacco comments:     no smokers in the household   Substance Use Topics    Alcohol use: Yes     Alcohol/week: 1.0 standard drink of alcohol     Types: 1 Standard drinks or equivalent per week     Comment: occ.- social. some weeks none. 1-2 socially.      Wt Readings from Last 1 Encounters:   24 88.4 kg (194 lb 14.4 oz)        Anesthesia Evaluation   Pt has had prior anesthetic.         ROS/MED HX  ENT/Pulmonary:  - neg pulmonary ROS     Neurologic:  - neg neurologic ROS     Cardiovascular:  - neg cardiovascular ROS     METS/Exercise Tolerance:     Hematologic:  - neg hematologic  ROS     Musculoskeletal:  - neg musculoskeletal ROS     GI/Hepatic:  - neg GI/hepatic ROS     Renal/Genitourinary:  - neg Renal ROS     Endo:     (+)               Obesity,       Psychiatric/Substance Use:  - neg psychiatric ROS     Infectious Disease:  - neg infectious disease ROS     Malignancy:       Other:            Physical Exam    Airway  airway exam normal      Mallampati: II   TM distance: > 3 FB   Neck ROM: full   Mouth opening: > 3 cm    Respiratory Devices and Support         Dental      "  (+) Completely normal teeth      Cardiovascular   cardiovascular exam normal       Rhythm and rate: regular and normal     Pulmonary   pulmonary exam normal        breath sounds clear to auscultation           OUTSIDE LABS:  CBC:   Lab Results   Component Value Date    WBC 8.7 02/24/2023    HGB 13.1 02/24/2023    HCT 40.3 02/24/2023     02/24/2023     BMP:   Lab Results   Component Value Date     02/24/2023    POTASSIUM 4.2 02/24/2023    CHLORIDE 110 (H) 02/24/2023    CO2 26 02/24/2023    BUN 10 02/24/2023    CR 0.66 02/24/2023    GLC 91 05/15/2024     (H) 05/14/2024     COAGS: No results found for: \"PTT\", \"INR\", \"FIBR\"  POC: No results found for: \"BGM\", \"HCG\", \"HCGS\"  HEPATIC: No results found for: \"ALBUMIN\", \"PROTTOTAL\", \"ALT\", \"AST\", \"GGT\", \"ALKPHOS\", \"BILITOTAL\", \"BILIDIRECT\", \"CRYSTAL\"  OTHER:   Lab Results   Component Value Date    HENNA 8.6 02/24/2023    TSH 1.03 02/24/2023       Anesthesia Plan    ASA Status:  2    NPO Status:  NPO Appropriate    Anesthesia Type: General.     - Airway: LMA   Induction: Intravenous, Propofol.   Maintenance: TIVA.        Consents    Anesthesia Plan(s) and associated risks, benefits, and realistic alternatives discussed. Questions answered and patient/representative(s) expressed understanding.     - Discussed:     - Discussed with:  Patient      - Extended Intubation/Ventilatory Support Discussed: No.           Postoperative Care    Pain management: IV analgesics, Oral pain medications, Multi-modal analgesia.   PONV prophylaxis: Ondansetron (or other 5HT-3), Dexamethasone or Solumedrol     Comments:               Kris Kebede MD    I have reviewed the pertinent notes and labs in the chart from the past 30 days and (re)examined the patient.  Any updates or changes from those notes are reflected in this note.              # Obesity: Estimated body mass index is 35.65 kg/m  as calculated from the following:    Height as of 5/23/24: 1.575 m (5' 2\").    " Weight as of this encounter: 88.4 kg (194 lb 14.4 oz).

## 2024-05-28 NOTE — OP NOTE
May 28, 2024    Andres Trivedi      Preoperative diagnosis:     1) Right breast nipple areolar complex necrosis     2) Left breast nipple areolar complex opening     Postoperative diagnosis: same     Procedure:    1) Irrigation and sharp debridement of necrotic right breast nipple areolar complex    2) Application of wound VAC therapy, right breast nipple areolar complex wound, measuring 5 cm x 3 cm x 1 cm for a total of 15 cm     3) Simple closure, 11.5 cm in length, left breast nipple areolar complex      Surgeon: Avinash Johnson MD.     Assistant: Barbara Tipton CSA (there was no plastic surgery resident available to assist).     Anesthesia: General and local, utilizing a total of 35 mL of lidocaine 1% with epinephrine on bilateral nipple areolar complexes     IV fluids: 700 mL     Findings: Full-thickness necrosis of the right breast nipple areolar complex from 7:00 to 4:00.  Periareolar opening on the left breast nipple-areolar complex.                   Specimen: None (necrotic right breast nipple areolar complex was discarded)     Drains: None     Disposition: Patient tolerated procedure well, she was extubated and transferred to recovery room awake and in stable condition.     Indications:    This is a 39 year old lady who presents with right breast nipple areola complex necrosis after bilateral breast reduction approximately 14 days ago.  Despite emergency bedside release of periareolar stitches, patient developed full-thickness necrosis affecting approximately 75% of her right breast nipple-areolar complex.  Periareolar release was also performed on the left breast nipple-areolar complex, with full recovery and survival of the left breast nipple-areolar complex.     She will be taken to the operating room for debridement of this necrotic nipple areolar complex (NAC) and application of wound VAC therapy as well as definite closure of the left breast nipple areolar complex.       Procedure:    Patient  was identified in the preoperative holding area and preoperative IV antibiotics were given to her.    She was then brought to the operating room and was placed supine on the operating room table.  After general anesthesia was obtained, both nipple areolar complexes were cleansed with alcohol swab and I proceeded to infiltrate on both nipple areolar complexes, lidocaine 1% with epinephrine.  A total of 35 mL was utilized.    Then both breasts were prepped and draped in sterile surgical fashion.    On the right breast, there was full-thickness necrosis from 7:00 to 4 o'clock position.  With scalpel #10, I proceeded to perform sharp debridement of this necrotic nipple areolar complex until I visualized healthy bleeding breast parenchyma underneath.  The necrotic nipple was discarded.    Irrigation with antibiotic solution made of Ancef and gentamicin was provided on the right breast wound.  I found that hemostasis was excellent.    In order to preserve the diameter of the right breast areola after the debridement, I applied 2-0 STRATAFIX buried subcuticular stitches around the areola and this kept the shape and diameter of the areola compared to the left breast nipple areolar complex.    The resulting defect was 5 cm x 3 cm x 1 cm for a total square area 15 cm .    At this time, I directed my attention to the left breast nipple areolar complex, and I proceeded to close with 4-0 Prolene simple multiple interrupted stitches, the multiple openings around the areola.  The length of the closure was 11.5 cm.    Wound VAC sponge was applied on the right breast nipple areolar complex wound.  Excellent seal meant was obtained.    Instrument count was reported by nursing personnel as correct.  Patient tolerated procedure well, she was then extubated and transferred to recovery room awake and in stable condition.    Avinash Johnson MD , FACS   Diplomate American Board of Plastic Surgery  Diplomate American Board of Surgery  Adj.  Assistant Professor of Surgery  Division of Plastic & Reconstructive Surgery   [facility]  Office: [phone]   5/28/2024 at 4:14 PM

## 2024-05-28 NOTE — ANESTHESIA CARE TRANSFER NOTE
Patient: Andres Trivedi    Procedure: Procedure(s):  IRRIGATION AND DEBRIDEMENT, WITH  APPLICATION OF WOUND VACUUM RIGHT NIPPLE AREOLA COMPLEX, SIMPLE  CLOSURE 11.5 CM OF LEFT AREOLA COMPLEX       Diagnosis: Nipple problem [N64.59]  Diagnosis Additional Information: No value filed.    Anesthesia Type:   General     Note:    Oropharynx: oropharynx clear of all foreign objects  Level of Consciousness: drowsy  Oxygen Supplementation: face mask  Level of Supplemental Oxygen (L/min / FiO2): 6  Independent Airway: airway patency satisfactory and stable  Dentition: dentition unchanged  Vital Signs Stable: post-procedure vital signs reviewed and stable  Report to RN Given: handoff report given  Patient transferred to: PACU    Handoff Report: Identifed the Patient, Identified the Reponsible Provider, Reviewed the pertinent medical history, Discussed the surgical course, Reviewed Intra-OP anesthesia mangement and issues during anesthesia, Set expectations for post-procedure period and Allowed opportunity for questions and acknowledgement of understanding      Vitals:  Vitals Value Taken Time   /61 05/28/24 1115   Temp 97.1    Pulse 88 05/28/24 1116   Resp 19 05/28/24 1114   SpO2 100 % 05/28/24 1116   Vitals shown include unfiled device data.    Electronically Signed By: ANIL Ferrer CRNA  May 28, 2024  11:17 AM

## 2024-05-28 NOTE — INTERVAL H&P NOTE
"I have reviewed the surgical (or preoperative) H&P that is linked to this encounter, and examined the patient. There are no significant changes    Patient will undergo debridement of the right breast necrotic nipple, meaning that she will lose the right breast nipple areolar complex, a wound VAC will be applied in that area and the left areola will be closed.  She understand that she will be a candidate for 3D nipple areolar complex tattooing in the future on the right breast.    Patient has agreed to proceed.    Clinical Conditions Present on Arrival:  Clinically Significant Risk Factors Present on Admission                  # Obesity: Estimated body mass index is 35.65 kg/m  as calculated from the following:    Height as of 5/23/24: 1.575 m (5' 2\").    Weight as of this encounter: 88.4 kg (194 lb 14.4 oz).       Avinash Johnosn MD , FACS   Diplomate American Board of Plastic Surgery  Diplomate American Board of Surgery  Adj. Assistant Professor of Surgery  Division of Plastic & Reconstructive Surgery   [facility]  Office: [phone]   5/28/2024 at 9:41 AM     "

## 2024-05-28 NOTE — ANESTHESIA PROCEDURE NOTES
Airway       Patient location during procedure: OR       Procedure Start/Stop Times: 5/28/2024 10:03 AM and 5/28/2024 10:07 AM  Staff -        CRNA: Nayely Jose APRN CRNA       Performed By: CRNA  Consent for Airway        Urgency: elective  Indications and Patient Condition       Indications for airway management: adelia-procedural       Induction type:intravenous       Mask difficulty assessment: 1 - vent by mask    Final Airway Details       Final airway type: supraglottic airway    Supraglottic Airway Details        Type: LMA       Brand: Ambu AuraGain       LMA size: 4    Post intubation assessment        Placement verified by: capnometry, equal breath sounds and chest rise        Number of attempts at approach: 1       Number of other approaches attempted: 0       Secured with: tape       Ease of procedure: easy       Dentition: Intact    Medication(s) Administered   Medication Administration Time: 5/28/2024 10:03 AM

## 2024-05-28 NOTE — ANESTHESIA POSTPROCEDURE EVALUATION
Patient: Andres Trivedi    Procedure: Procedure(s):  IRRIGATION AND DEBRIDEMENT, WITH  APPLICATION OF WOUND VACUUM RIGHT NIPPLE AREOLA COMPLEX, SIMPLE  CLOSURE 11.5 CM OF LEFT AREOLA COMPLEX       Anesthesia Type:  General    Note:  Disposition: Outpatient   Postop Pain Control: Uneventful            Sign Out: Well controlled pain   PONV: No   Neuro/Psych: Uneventful            Sign Out: Acceptable/Baseline neuro status   Airway/Respiratory: Uneventful            Sign Out: Acceptable/Baseline resp. status   CV/Hemodynamics: Uneventful            Sign Out: Acceptable CV status; No obvious hypovolemia; No obvious fluid overload   Other NRE: NONE   DID A NON-ROUTINE EVENT OCCUR? No           Last vitals:  Vitals Value Taken Time   /67 05/28/24 1217   Temp 36.2  C (97.1  F) 05/28/24 1113   Pulse 86 05/28/24 1222   Resp 22 05/28/24 1222   SpO2 93 % 05/28/24 1221   Vitals shown include unfiled device data.    Electronically Signed By: Kris Kebede MD  May 28, 2024  1:34 PM

## 2024-05-31 ENCOUNTER — MYC MEDICAL ADVICE (OUTPATIENT)
Dept: PLASTIC SURGERY | Facility: AMBULATORY SURGERY CENTER | Age: 39
End: 2024-05-31

## 2024-05-31 ENCOUNTER — OFFICE VISIT (OUTPATIENT)
Dept: PLASTIC SURGERY | Facility: AMBULATORY SURGERY CENTER | Age: 39
End: 2024-05-31
Payer: COMMERCIAL

## 2024-05-31 DIAGNOSIS — Z98.890 STATUS POST BILATERAL BREAST REDUCTION: Primary | ICD-10-CM

## 2024-05-31 PROCEDURE — 99024 POSTOP FOLLOW-UP VISIT: CPT | Performed by: PLASTIC SURGERY

## 2024-05-31 NOTE — NURSING NOTE
Proof of delivery has been signed and faxed to [facility] at [phone].    Ashley Vega RN on 5/31/2024 at 11:19 AM

## 2024-05-31 NOTE — LETTER
5/31/2024         RE: Andres Trivedi  [address] [address] [facility] [address]        Dear Colleague,    Thank you for referring your patient, Andres Trivedi, to the [facility] [place]. Please see a copy of my visit note below.    Andres is a 39 years old lady status post irrigation and debridement of right breast nipple necrosis with application of wound VAC as well as closure of the left nipple areolar complex.  She is 3 days out from surgery.  She is doing well.    At the physical exam, wound VAC has been removed, the right breast nipple-areolar complex area is healing well.  No sign of infection or necrosis.  Good shape on the areola and the remaining nipple is viable with capillary refill less than 2 seconds.  The left nipple areolar complex presents with capillary refill less than 2 seconds.  Also good shape on the left nipple areolar complex.    New wound VAC has been applied on the right nipple with excellent seal meant.    Plan: Return next week for wound VAC exchange.  Patient continues to do well.    Avinash Johnson MD , FACS   Diplomate American Board of Plastic Surgery  Diplomate American Board of Surgery  Adj. Assistant Professor of Surgery  Division of Plastic & Reconstructive Surgery   [facility]  Office: [phone]   5/31/2024 at 12:19 PM       Again, thank you for allowing me to participate in the care of your patient.        Sincerely,        Avinash Johnson MD

## 2024-05-31 NOTE — PROGRESS NOTES
Andres is a 39 years old lady status post irrigation and debridement of right breast nipple necrosis with application of wound VAC as well as closure of the left nipple areolar complex.  She is 3 days out from surgery.  She is doing well.    At the physical exam, wound VAC has been removed, the right breast nipple-areolar complex area is healing well.  No sign of infection or necrosis.  Good shape on the areola and the remaining nipple is viable with capillary refill less than 2 seconds.  The left nipple areolar complex presents with capillary refill less than 2 seconds.  Also good shape on the left nipple areolar complex.    New wound VAC has been applied on the right nipple with excellent seal meant.    Plan: Return next week for wound VAC exchange.  Patient continues to do well.    Avinash Johnson MD , FACS   Diplomate American Board of Plastic Surgery  Diplomate American Board of Surgery  Adj. Assistant Professor of Surgery  Division of Plastic & Reconstructive Surgery   [facility]  Office: [phone]   5/31/2024 at 12:19 PM

## 2024-06-02 ENCOUNTER — MYC MEDICAL ADVICE (OUTPATIENT)
Dept: FAMILY MEDICINE | Facility: CLINIC | Age: 39
End: 2024-06-02
Payer: COMMERCIAL

## 2024-06-03 ENCOUNTER — MYC MEDICAL ADVICE (OUTPATIENT)
Dept: PLASTIC SURGERY | Facility: AMBULATORY SURGERY CENTER | Age: 39
End: 2024-06-03
Payer: COMMERCIAL

## 2024-06-03 RX ORDER — OXYCODONE AND ACETAMINOPHEN 5; 325 MG/1; MG/1
1 TABLET ORAL EVERY 6 HOURS PRN
Qty: 18 TABLET | Refills: 0 | Status: SHIPPED | OUTPATIENT
Start: 2024-06-03 | End: 2024-06-08

## 2024-06-04 ENCOUNTER — OFFICE VISIT (OUTPATIENT)
Dept: PLASTIC SURGERY | Facility: AMBULATORY SURGERY CENTER | Age: 39
End: 2024-06-04
Payer: COMMERCIAL

## 2024-06-04 DIAGNOSIS — N64.59 NIPPLE PROBLEM: ICD-10-CM

## 2024-06-04 DIAGNOSIS — Z98.890 STATUS POST BILATERAL BREAST REDUCTION: Primary | ICD-10-CM

## 2024-06-04 PROCEDURE — 99207 PR NO CHARGE NURSE ONLY: CPT

## 2024-06-04 NOTE — PROGRESS NOTES
Patient here today for wound vac dressing change.     Patient is S/P Mammoplasty Reduction on 05/13/2024 and S/P Irrigation and sharp debridement of necrotic right breast nipple areolar complex on 05/28/2024    Patient is doing well, reports better pain management with narcotic. Encouraged also using Tylenol and Ibuprofen.    The following measurements were taken during this visit:  Wound #1   4cm x 3 cm x 1 cm  (Length x Width x Depth)    Patient tolerate dressing change well. Follow-up appointment scheduled.     Ashley Vega RN on 6/4/2024 at 8:25 AM

## 2024-06-07 ENCOUNTER — OFFICE VISIT (OUTPATIENT)
Dept: PLASTIC SURGERY | Facility: AMBULATORY SURGERY CENTER | Age: 39
End: 2024-06-07
Payer: COMMERCIAL

## 2024-06-07 ENCOUNTER — TELEPHONE (OUTPATIENT)
Dept: PLASTIC SURGERY | Facility: CLINIC | Age: 39
End: 2024-06-07

## 2024-06-07 DIAGNOSIS — Z98.890 S/P BILATERAL BREAST REDUCTION: Primary | ICD-10-CM

## 2024-06-07 PROCEDURE — 99024 POSTOP FOLLOW-UP VISIT: CPT | Performed by: PLASTIC SURGERY

## 2024-06-07 RX ORDER — KETOROLAC TROMETHAMINE 10 MG/1
10 TABLET, FILM COATED ORAL EVERY 6 HOURS PRN
Qty: 20 TABLET | Refills: 0 | Status: SHIPPED | OUTPATIENT
Start: 2024-06-07 | End: 2024-06-12

## 2024-06-07 RX ORDER — IBUPROFEN 800 MG/1
800 TABLET, FILM COATED ORAL EVERY 8 HOURS PRN
Qty: 30 TABLET | Refills: 0 | Status: SHIPPED | OUTPATIENT
Start: 2024-06-07 | End: 2024-06-07

## 2024-06-07 NOTE — NURSING NOTE
Patient here today for wound vac change and post-op follow-up. She is doing well overall but still reports discomfort. Would like to discuss alternate to narcotic. Will talk to provider about Toradol.     Patient also requesting letter for work to discuss half day restrictions for all of next week and then on Tuesday's and Fridays when medical appointments are. Letter has been written and copy given to patient.     Paperwork requested from [employer] Work Note has been faxed to [phone].      Ashley Vega RN on 6/7/2024 at 8:22 AM

## 2024-06-07 NOTE — TELEPHONE ENCOUNTER
[facility] Call Center    Phone Message    May a detailed message be left on voicemail: yes     Reason for Call: Other: [place] calling about drug interactions to Toradol and ibuprofen. Please call to discuss.       Action Taken: Message routed to:  [facility]urg    Travel Screening: Not Applicable     Date of Service:

## 2024-06-07 NOTE — PROGRESS NOTES
Andres is a 39 years old lady is status post bilateral breast reduction.  She did sustain partial full-thickness necrosis of the right nipple areolar complex.  She is 3 weeks out from her original surgery.  Then about 10 days ago she underwent debridement of this right nipple areolar complex with application of a wound VAC.  She returns today for reevaluation and wound VAC exchange.    At the physical exam, the right breast nipple areolar complex from 9 o'clock position to 3 o'clock position is healing well.  The wound is shallow and showing early granulation tissue.  No sign of infection or further necrosis.  The breast parenchyma that is exposed showed some oxidation of the exposed tissue which is normal under the circumstances.  There is some skin irritation due to the dressing of the wound VAC.            Plan: Continue with wound VAC therapy.  I anticipate wound VAC therapy for the next 2 to 3 weeks and then we will transition to dressing changes because the wound is getting shallow and is healing.    I will see her next week.  She is healing well.    Avinash Johnson MD , FACS   Diplomate American Board of Plastic Surgery  Diplomate American Board of Surgery  Adj. Assistant Professor of Surgery  Division of Plastic & Reconstructive Surgery   [facility]  Office: [phone]   6/7/2024 at 8:56 AM

## 2024-06-07 NOTE — LETTER
[facility] PLASTIC SURGERY CLINIC [place]  [address], [address]  [place] [address]  [phone]          June 7, 2024    RE:  Andres Trivedi                                                                                                                                                       [address] [address] [address]            To whom it may concern:    Andres Trivedi is under my professional care for a surgical procedure on 05/28/2024. Due to the procedure the patient will need to work a reduced work schedule.   She may return to work half days starting 06/10/2024 through 06/14/2024. The weeks following we kindly ask for half days on Tuesdays and Fridays so she can attend medical appointments for dressing changes. We are unable to determine how long these appointments will be needed. Once healed we will send a new letter letting you know the patient is able to return to work full time without restriction.       Sincerely,        Avinash Johnson MD

## 2024-06-07 NOTE — LETTER
6/7/2024      Andres Trivedi  [address] [address]      Dear Colleague,    Thank you for referring your patient, Andres Trivedi, to the [facility] PLASTIC SURGERY CLINIC [place]. Please see a copy of my visit note below.    Andres is a 39 years old lady is status post bilateral breast reduction.  She did sustain partial full-thickness necrosis of the right nipple areolar complex.  She is 3 weeks out from her original surgery.  Then about 10 days ago she underwent debridement of this right nipple areolar complex with application of a wound VAC.  She returns today for reevaluation and wound VAC exchange.    At the physical exam, the right breast nipple areolar complex from 9 o'clock position to 3 o'clock position is healing well.  The wound is shallow and showing early granulation tissue.  No sign of infection or further necrosis.  The breast parenchyma that is exposed showed some oxidation of the exposed tissue which is normal under the circumstances.  There is some skin irritation due to the dressing of the wound VAC.            Plan: Continue with wound VAC therapy.  I anticipate wound VAC therapy for the next 2 to 3 weeks and then we will transition to dressing changes because the wound is getting shallow and is healing.    I will see her next week.  She is healing well.    Avinash Johnson MD , FACS   Diplomate American Board of Plastic Surgery  Diplomate American Board of Surgery  Adj. Assistant Professor of Surgery  Division of Plastic & Reconstructive Surgery   [facility]  Office: [phone]   6/7/2024 at 8:56 AM     Again, thank you for allowing me to participate in the care of your patient.        Sincerely,        Avinash Johnson MD

## 2024-06-11 ENCOUNTER — ALLIED HEALTH/NURSE VISIT (OUTPATIENT)
Dept: PLASTIC SURGERY | Facility: AMBULATORY SURGERY CENTER | Age: 39
End: 2024-06-11
Payer: COMMERCIAL

## 2024-06-11 DIAGNOSIS — N64.59 NIPPLE PROBLEM: Primary | ICD-10-CM

## 2024-06-11 PROCEDURE — 99207 PR NO CHARGE NURSE ONLY: CPT

## 2024-06-11 NOTE — PROGRESS NOTES
Patient here today for wound vac dressing change.     Patient is S/P Mammoplasty Reduction on 5/3/2024 and S/P irrigation and sharp debridement of necrotic right breast nipple areolar complex on 5/28/2024.     Patient is doing well, better pain management, and there was some red irritated skin present around the wound. Patient was encouraged to use Benadryl to help with the inflammation.     The following measurements were taken during this visit:  Wound #1   3.8 cm x 3 cm x 1 cm  (Length x Width x Depth)    Patient tolerate dressing change well. Follow-up appointment scheduled for 6/14/2024.    Ana See RN on 6/11/2024 at 8:53 AM

## 2024-06-14 ENCOUNTER — OFFICE VISIT (OUTPATIENT)
Dept: PLASTIC SURGERY | Facility: AMBULATORY SURGERY CENTER | Age: 39
End: 2024-06-14
Payer: COMMERCIAL

## 2024-06-14 DIAGNOSIS — Z98.890 STATUS POST BILATERAL BREAST REDUCTION: Primary | ICD-10-CM

## 2024-06-14 PROCEDURE — 99024 POSTOP FOLLOW-UP VISIT: CPT | Performed by: PLASTIC SURGERY

## 2024-06-14 NOTE — LETTER
6/14/2024      Andres Trivedi  [address] [address]      Dear Colleague,    Thank you for referring your patient, Andres Trivedi, to the [facility] PLASTIC SURGERY CLINIC [place]. Please see a copy of my visit note below.    Ms. Trivedi is a 39 years old lady status post bilateral breast reduction.  She is 4 weeks out from surgery.  Patient did experience partial necrosis of the right breast nipple-areolar complex (NAC).  Said necrosis has been treated with debridement, complex closure and application of wound VAC therapy.    At the physical exam, the upper half of the NAC is presenting with early granulation tissue.  There is some oxidated breast parenchyma.  However no sign of infection or necrosis.    The soft tissue defect is actually getting smaller and shallow.  There is preserved diameter and shape of the NAC.        Plan:  Since the wound is smaller and shallow, I am going to discontinue VAC therapy.  I will initiate local wound care with Aquaphor.    I will see the patient again in 2 weeks and at that time I will evaluate the wound for possible definite closure with stitches in the operating room.    Avinash Johnson MD , FACS   Diplomate American Board of Plastic Surgery  Diplomate American Board of Surgery  Adj. Assistant Professor of Surgery  Division of Plastic & Reconstructive Surgery   [facility]  Office: [phone]   6/14/2024 at 4:40 PM       Again, thank you for allowing me to participate in the care of your patient.        Sincerely,        Avinash Johnson MD

## 2024-06-14 NOTE — PROGRESS NOTES
Ms. Trivedi is a 39 years old lady status post bilateral breast reduction.  She is 4 weeks out from surgery.  Patient did experience partial necrosis of the right breast nipple-areolar complex (NAC).  Said necrosis has been treated with debridement, complex closure and application of wound VAC therapy.    At the physical exam, the upper half of the NAC is presenting with early granulation tissue.  There is some oxidated breast parenchyma.  However no sign of infection or necrosis.    The soft tissue defect is actually getting smaller and shallow.  There is preserved diameter and shape of the NAC.        Plan:  Since the wound is smaller and shallow, I am going to discontinue VAC therapy.  I will initiate local wound care with Aquaphor.    I will see the patient again in 2 weeks and at that time I will evaluate the wound for possible definite closure with stitches in the operating room.    Avinash Johnson MD , FACS   Diplomate American Board of Plastic Surgery  Diplomate American Board of Surgery  Adj. Assistant Professor of Surgery  Division of Plastic & Reconstructive Surgery   [facility]  Office: [phone]   6/14/2024 at 4:40 PM

## 2024-06-14 NOTE — NURSING NOTE
Patient here today for wound vac change. Doing well overall. Reports that Toradol is working but she does not notice much of a difference from when she was taking Ibuprofen.    Ashley Vega RN on 6/14/2024 at 8:13 AM

## 2024-06-17 NOTE — TELEPHONE ENCOUNTER
Return call placed to pharmacist to discuss Toradol and Ibuprofen interaction.     Ibuprofen has been discontinued and Toradol will be filled. Pharmacist will discuss not taking Ibuprofen while on Toradol with patient during .       Ashley Vega RN on 6/7/2024 at 9:42 AM     [FreeTextEntry2] : Follow up- Right Knee Pain  WC: 01/13/14

## 2024-06-18 ENCOUNTER — OFFICE VISIT (OUTPATIENT)
Dept: PLASTIC SURGERY | Facility: AMBULATORY SURGERY CENTER | Age: 39
End: 2024-06-18
Payer: COMMERCIAL

## 2024-06-18 DIAGNOSIS — Z98.890 STATUS POST BILATERAL BREAST REDUCTION: Primary | ICD-10-CM

## 2024-06-18 PROCEDURE — 99024 POSTOP FOLLOW-UP VISIT: CPT | Performed by: PLASTIC SURGERY

## 2024-06-18 NOTE — LETTER
6/18/2024      Andres Trivedi  [address] [place] [address]      Dear Colleague,    Thank you for referring your patient, Andres Trivedi, to the [facility] PLASTIC SURGERY CLINIC [place]. Please see a copy of my visit note below.    Andres is a 39 years old patient status post bilateral breast reduction.  She did develop partial full-thickness necrosis of the right breast nipple-areolar complex.  This was debrided and application of wound VAC therapy was initiated approximately 3 weeks ago.    She returns today for reevaluation.  A week ago I removed the wound VAC.  However, patient is reporting that the exposed breast parenchyma is oxidated and she would like to rule out any infection.    At the physical exam, the exposed breast Parenchyma on the upper half of the areola looks oxidated.  However there are no sign of infection like purulence or erythema.    I performed cleansing with hydrogen peroxide and sterile saline and I have reapplied the wound VAC.    Plan: Continue with wound VAC therapy for the next couple of weeks and then closure of the defect in the operating room.  I will see the patient again this Friday for another wound VAC therapy exchange.    Avinash Johnson MD , FACS   Diplomate American Board of Plastic Surgery  Diplomate American Board of Surgery  Adj. Assistant Professor of Surgery  Division of Plastic & Reconstructive Surgery   [facility]  Office: [phone]   6/18/2024 at 4:23 PM       Again, thank you for allowing me to participate in the care of your patient.        Sincerely,        Avinash Johnson MD

## 2024-06-18 NOTE — PROGRESS NOTES
Andres is a 39 years old patient status post bilateral breast reduction.  She did develop partial full-thickness necrosis of the right breast nipple-areolar complex.  This was debrided and application of wound VAC therapy was initiated approximately 3 weeks ago.    She returns today for reevaluation.  A week ago I removed the wound VAC.  However, patient is reporting that the exposed breast parenchyma is oxidated and she would like to rule out any infection.    At the physical exam, the exposed breast Parenchyma on the upper half of the areola looks oxidated.  However there are no sign of infection like purulence or erythema.    I performed cleansing with hydrogen peroxide and sterile saline and I have reapplied the wound VAC.    Plan: Continue with wound VAC therapy for the next couple of weeks and then closure of the defect in the operating room.  I will see the patient again this Friday for another wound VAC therapy exchange.    Avinash Johnson MD , FACS   Diplomate American Board of Plastic Surgery  Diplomate American Board of Surgery  Adj. Assistant Professor of Surgery  Division of Plastic & Reconstructive Surgery   [facility]  Office: [phone]   6/18/2024 at 4:23 PM

## 2024-06-21 ENCOUNTER — OFFICE VISIT (OUTPATIENT)
Dept: PLASTIC SURGERY | Facility: AMBULATORY SURGERY CENTER | Age: 39
End: 2024-06-21
Payer: COMMERCIAL

## 2024-06-21 DIAGNOSIS — Z98.890 STATUS POST BILATERAL BREAST REDUCTION: Primary | ICD-10-CM

## 2024-06-21 PROCEDURE — 99024 POSTOP FOLLOW-UP VISIT: CPT | Performed by: PLASTIC SURGERY

## 2024-06-21 NOTE — PROGRESS NOTES
Andres is a 39 years old patient status post bilateral breast reduction.  She is 5 weeks out from her original surgery.  Patient did develop partial full-thickness necrosis of the right nipple areolar complex.  Then 3 weeks ago, I took her back to the operating room for partial debridement of the necrotic nipple areolar complex, partial closure and application of wound VAC therapy.  She returns today for reevaluation.    At the physical exam, the defect in the nipple areolar complex is getting smaller.  There is good granulation tissue present in the periphery of the defect.  No sign of further necrosis.  There is no sign of infection.        Plan: Continue with wound VAC therapy so that the defect continue to become smaller and shallow.  Follow-up with my nurse on Tuesday and with me next Friday.  I anticipate definite closure of the defect with sutures in the operating room within the next 2 weeks.    Avinash Johnson MD , FACS   Diplomate American Board of Plastic Surgery  Diplomate American Board of Surgery  Adj. Assistant Professor of Surgery  Division of Plastic & Reconstructive Surgery   [facility]  Office: [phone]   6/21/2024 at 8:36 AM

## 2024-06-21 NOTE — LETTER
6/21/2024      And[place]riv[place]  [address] [address] [address]      Dear Colleague,    Thank you for referring your patient, Andres Trivedi, to the [facility] PLASTIC SURGERY CLINIC [place]. Please see a copy of my visit note below.    Andres is a 39 years old patient status post bilateral breast reduction.  She is 5 weeks out from her original surgery.  Patient did develop partial full-thickness necrosis of the right nipple areolar complex.  Then 3 weeks ago, I took her back to the operating room for partial debridement of the necrotic nipple areolar complex, partial closure and application of wound VAC therapy.  She returns today for reevaluation.    At the physical exam, the defect in the nipple areolar complex is getting smaller.  There is good granulation tissue present in the periphery of the defect.  No sign of further necrosis.  There is no sign of infection.        Plan: Continue with wound VAC therapy so that the defect continue to become smaller and shallow.  Follow-up with my nurse on Tuesday and with me next Friday.  I anticipate definite closure of the defect with sutures in the operating room within the next 2 weeks.    Avinash Johnson MD , FACS   Diplomate American Board of Plastic Surgery  Diplomate American Board of Surgery  Adj. Assistant Professor of Surgery  Division of Plastic & Reconstructive Surgery   [facility]  Office: [phone]   6/21/2024 at 8:36 AM       Again, thank you for allowing me to participate in the care of your patient.        Sincerely,        Avinash Johnson MD

## 2024-06-25 ENCOUNTER — ALLIED HEALTH/NURSE VISIT (OUTPATIENT)
Dept: PLASTIC SURGERY | Facility: AMBULATORY SURGERY CENTER | Age: 39
End: 2024-06-25
Payer: COMMERCIAL

## 2024-06-25 DIAGNOSIS — Z98.890 STATUS POST BILATERAL BREAST REDUCTION: ICD-10-CM

## 2024-06-25 DIAGNOSIS — N64.59 NIPPLE PROBLEM: Primary | ICD-10-CM

## 2024-06-25 PROCEDURE — 99207 PR NO CHARGE NURSE ONLY: CPT

## 2024-06-25 NOTE — NURSING NOTE
Updated wound measurement faxed to [facility] /  at [phone].    Ashley Vega RN on 6/25/2024 at 8:35 AM

## 2024-06-25 NOTE — PROGRESS NOTES
Patient here today for wound vac dressing change.     Patient is S/P Mammoplasty Reduction on 05/13/2024 and   S/P Irrigation and sharp debridement of necrotic right breast nipple areolar complex on 05/28/2024    Patient is doing well, no concerns or complaints at this time.     The following measurements were taken during this visit:  Wound #1   4 cm x 2.75 cm x 0.75 cm  (Length x Width x Depth)    Patient tolerate dressing change well. Follow-up appointment scheduled.     Ashley Vega RN on 6/25/2024 at 8:25 AM

## 2024-06-28 ENCOUNTER — OFFICE VISIT (OUTPATIENT)
Dept: PLASTIC SURGERY | Facility: AMBULATORY SURGERY CENTER | Age: 39
End: 2024-06-28
Payer: COMMERCIAL

## 2024-06-28 DIAGNOSIS — N64.59 NIPPLE PROBLEM: Primary | ICD-10-CM

## 2024-06-28 PROCEDURE — 99024 POSTOP FOLLOW-UP VISIT: CPT | Performed by: PLASTIC SURGERY

## 2024-06-28 NOTE — LETTER
6/28/2024      Andres Trivedi  [address] [address]      Dear Colleague,    Thank you for referring your patient, Andres Trivedi, to the [facility] PLASTIC SURGERY CLINIC [place]. Please see a copy of my visit note below.    Andres is the 39 years old patient with history of bilateral breast reduction.  She is 6 weeks out from her original surgery.  Patient did experience partial full-thickness necrosis of the right nipple areolar complex.  This has been treated with multiple wound VAC therapy exchanges.    At the physical exam, the defect continued to become shallower and smaller.  Good granulation tissue present all around.  Tissue is softer.    Plan: Now that the defect is shallower, a smaller and inflammation is less intense, I will take the patient back to the operating room in a week for primary closure.  In the meantime continue with wound VAC therapy and I will see her again next Wednesday 2 days prior to her surgery.    I have explained to her that it is possible that I will not be able to close the whole defect, however, if that is the case, then she will have a smaller wound that we will be able to close by secondary intention without the need of the wound VAC.  In the future she may need 3D tattooing of the upper half of the right breast NAC.    She continues to do well.    Avinash Johnson MD , FACS   Diplomate American Board of Plastic Surgery  Diplomate American Board of Surgery  Adj. Assistant Professor of Surgery  Division of Plastic & Reconstructive Surgery   [facility]  Office: [phone]   6/28/2024 at 8:59 AM     Again, thank you for allowing me to participate in the care of your patient.        Sincerely,        Avinash Johnson MD

## 2024-06-28 NOTE — PROGRESS NOTES
Andres is the 39 years old patient with history of bilateral breast reduction.  She is 6 weeks out from her original surgery.  Patient did experience partial full-thickness necrosis of the right nipple areolar complex.  This has been treated with multiple wound VAC therapy exchanges.    At the physical exam, the defect continued to become shallower and smaller.  Good granulation tissue present all around.  Tissue is softer.    Plan: Now that the defect is shallower, a smaller and inflammation is less intense, I will take the patient back to the operating room in a week for primary closure.  In the meantime continue with wound VAC therapy and I will see her again next Wednesday 2 days prior to her surgery.    I have explained to her that it is possible that I will not be able to close the whole defect, however, if that is the case, then she will have a smaller wound that we will be able to close by secondary intention without the need of the wound VAC.  In the future she may need 3D tattooing of the upper half of the right breast NAC.    She continues to do well.    Avinash Johnson MD , FACS   Diplomate American Board of Plastic Surgery  Diplomate American Board of Surgery  Adj. Assistant Professor of Surgery  Division of Plastic & Reconstructive Surgery   [facility]  Office: [phone]   6/28/2024 at 8:59 AM

## 2024-06-28 NOTE — LETTER
[facility] PLASTIC SURGERY CLINIC [place]  [address], [address]  [place] [address]  [phone]          June 28, 2024    RE:  Andres Trivedi                                                                                                                                                       [address] [address] [address]            To whom it may concern:    Andres Trivedi is under my professional care for a surgical procedure scheduled for 07/05/2024, she will need a full day off. Prior to this appointment she will need to come in for a wound vac change on 07/03/2024, so please allow her a half day off to attend this appointment. . Post-operatively she will have visits on Friday mornings until July 26th. We kindly ask you allow half days on Fridays throughout July for her to attend post-op visits.         Sincerely,        Avinash Johnson MD     16

## 2024-07-03 ENCOUNTER — OFFICE VISIT (OUTPATIENT)
Dept: PLASTIC SURGERY | Facility: AMBULATORY SURGERY CENTER | Age: 39
End: 2024-07-03
Payer: COMMERCIAL

## 2024-07-03 DIAGNOSIS — N64.59 NIPPLE PROBLEM: Primary | ICD-10-CM

## 2024-07-03 PROCEDURE — 99024 POSTOP FOLLOW-UP VISIT: CPT | Performed by: PLASTIC SURGERY

## 2024-07-03 NOTE — NURSING NOTE
Patients wound vac has been discontinued as of 07/03/2024.    [employer] has been called at [phone] and  has been scheduled via [employer] for Friday 07/05/2024.    Ashley Vega RN on 7/3/2024 at 8:57 AM

## 2024-07-03 NOTE — PROGRESS NOTES
Chief complaint:  Open wound right breast nipple areolar complex (NAC)    History of present illness:  This is a 39 year old lady who presents with an open wound on her right breast NAC.  She is a status post bilateral breast reduction 7 weeks ago.  She did develop partial full-thickness necrosis of the upper half of her right breast NAC.  This has been treated with necrosectomy as well as local wound care and wound VAC therapy.    Patient is now ready for closure in the operating room.    Past medical history:  Past Medical History:   Diagnosis Date    Chronic bilateral thoracic back pain     Depressive disorder 2019    Was on Lexapro for 2 years. No current medications.    Macromastia     Neck pain     Obesity     Transfusion of blood product declined due to [employer] reason        Past surgical history:  Bilateral breast reduction, irrigation and debridement right breast NAC    Allergies:  No known drug allergies    Medications:    Current Outpatient Medications:     ibuprofen (ADVIL/MOTRIN) 800 MG tablet, Take 1 tablet (800 mg) by mouth every 6 hours as needed for moderate pain, Disp: 30 tablet, Rfl: 0    levonorgestrel (MIRENA) 20 MCG/DAY IUD, 1 each (20 mcg) by Intrauterine route once, Disp: , Rfl:     vitamin C (ASCORBIC ACID) 1000 MG TABS, Take 1,000 mg by mouth daily, Disp: , Rfl:     Zinc Sulfate (ZINC 15 PO), , Disp: , Rfl:     phentermine 15 MG capsule, Take 1 capsule (15 mg) by mouth every morning Due for visit May 2024. (Patient not taking: Reported on 7/3/2024), Disp: 30 capsule, Rfl: 0    Family history:  Noncontributory    Social History:  Denies tobacco, drinks alcohol socially    Review of systems:  General ROS: No complaints or constitutional symptoms  Skin: No complaints or symptoms   Hematologic/Lymphatic: No symptoms or complaints  Psychiatric: No symptoms or complaints  Endocrine: No excessive fatigue, no hypermetabolic symptoms reported  Respiratory ROS: No cough, shortness of breath, or  wheezing  Cardiovascular ROS: No chest pain or dyspnea on exertion  Breast ROS: Open wound right breast NAC  Gastrointestinal ROS: No abdominal pain, nausea, diarrhea, or constipation  Musculoskeletal ROS: No recent injuries reported  Neurological ROS: No focal neurologic defects reported.      Physical exam:  LMP  (LMP Unknown)   General: Alert, cooperative, appears stated age   Skin: Skin color, texture, turgor normal, no rashes or lesions   Lymphatic: No obvious adenopathy, no swelling   Eyes: No scleral icterus, pupils equal  HENT: No traumatic injury to the head or face, no gross abnormalities  Lungs: Normal respiratory effort, breath sounds equal bilaterally  Heart: Regular rate and rhythm  Breasts: Soft tissue defect, full-thickness, with exposed oxidated breast parenchyma in the upper half of her right breast NAC.  No sign of infection like purulence.  The remaining lower half of the NAC is viable.  There is inflammation in the periphery of this open wound.  Vertical limb and horizontal limb of the right breast Soriano pattern is well-healed.  The left breast incisions are also well-healed.  The right breast NAC defect measures approximately 3 cm x 4 cm x 0.5 cm.  He has become significantly shallower in the last few weeks.  Abdomen: Soft, non-distended and non-tender to palpation  Neurologic: Grossly intact      ASSESSMENT:    This is a 39 year old lady with open wound, right breast nipple-areolar complex.    She is a status post bilateral breast reduction and sustained a partial full-thickness necrosis of the upper half of the right breast NAC.      PLAN:      Patient will be taken to the operating room for attempt permanent closure with purse string closure/complex closure of this right breast NAC.    I have explained to the patient that I cannot guarantee that I will be able to completely close the defect.  However, the defect would become significantly smaller after closure and this could heal by secondary  intention.    Patient has agreed to proceed.    Eventually, I have explained to her that she will need a 3D tattooing on this right breast NAC.  However we will have to wait at least 3 months for this tattooing so that the inflammation subsides.    Avinash Johnson MD, FACS   Diplomate American Board of Plastic Surgery  Diplomate American Board of Surgery  [facility]  Division of Plastic & Reconstructive Surgery  Office: [phone]   7/3/2024 at 9:23 AM

## 2024-07-03 NOTE — H&P (VIEW-ONLY)
Chief complaint:  Open wound right breast nipple areolar complex (NAC)    History of present illness:  This is a 39 year old lady who presents with an open wound on her right breast NAC.  She is a status post bilateral breast reduction 7 weeks ago.  She did develop partial full-thickness necrosis of the upper half of her right breast NAC.  This has been treated with necrosectomy as well as local wound care and wound VAC therapy.    Patient is now ready for closure in the operating room.    Past medical history:  Past Medical History:   Diagnosis Date    Chronic bilateral thoracic back pain     Depressive disorder 2019    Was on Lexapro for 2 years. No current medications.    Macromastia     Neck pain     Obesity     Transfusion of blood product declined due to [employer] reason        Past surgical history:  Bilateral breast reduction, irrigation and debridement right breast NAC    Allergies:  No known drug allergies    Medications:    Current Outpatient Medications:     ibuprofen (ADVIL/MOTRIN) 800 MG tablet, Take 1 tablet (800 mg) by mouth every 6 hours as needed for moderate pain, Disp: 30 tablet, Rfl: 0    levonorgestrel (MIRENA) 20 MCG/DAY IUD, 1 each (20 mcg) by Intrauterine route once, Disp: , Rfl:     vitamin C (ASCORBIC ACID) 1000 MG TABS, Take 1,000 mg by mouth daily, Disp: , Rfl:     Zinc Sulfate (ZINC 15 PO), , Disp: , Rfl:     phentermine 15 MG capsule, Take 1 capsule (15 mg) by mouth every morning Due for visit May 2024. (Patient not taking: Reported on 7/3/2024), Disp: 30 capsule, Rfl: 0    Family history:  Noncontributory    Social History:  Denies tobacco, drinks alcohol socially    Review of systems:  General ROS: No complaints or constitutional symptoms  Skin: No complaints or symptoms   Hematologic/Lymphatic: No symptoms or complaints  Psychiatric: No symptoms or complaints  Endocrine: No excessive fatigue, no hypermetabolic symptoms reported  Respiratory ROS: No cough, shortness of breath, or  wheezing  Cardiovascular ROS: No chest pain or dyspnea on exertion  Breast ROS: Open wound right breast NAC  Gastrointestinal ROS: No abdominal pain, nausea, diarrhea, or constipation  Musculoskeletal ROS: No recent injuries reported  Neurological ROS: No focal neurologic defects reported.      Physical exam:  LMP  (LMP Unknown)   General: Alert, cooperative, appears stated age   Skin: Skin color, texture, turgor normal, no rashes or lesions   Lymphatic: No obvious adenopathy, no swelling   Eyes: No scleral icterus, pupils equal  HENT: No traumatic injury to the head or face, no gross abnormalities  Lungs: Normal respiratory effort, breath sounds equal bilaterally  Heart: Regular rate and rhythm  Breasts: Soft tissue defect, full-thickness, with exposed oxidated breast parenchyma in the upper half of her right breast NAC.  No sign of infection like purulence.  The remaining lower half of the NAC is viable.  There is inflammation in the periphery of this open wound.  Vertical limb and horizontal limb of the right breast Soriano pattern is well-healed.  The left breast incisions are also well-healed.  The right breast NAC defect measures approximately 3 cm x 4 cm x 0.5 cm.  He has become significantly shallower in the last few weeks.  Abdomen: Soft, non-distended and non-tender to palpation  Neurologic: Grossly intact      ASSESSMENT:    This is a 39 year old lady with open wound, right breast nipple-areolar complex.    She is a status post bilateral breast reduction and sustained a partial full-thickness necrosis of the upper half of the right breast NAC.      PLAN:      Patient will be taken to the operating room for attempt permanent closure with purse string closure/complex closure of this right breast NAC.    I have explained to the patient that I cannot guarantee that I will be able to completely close the defect.  However, the defect would become significantly smaller after closure and this could heal by secondary  intention.    Patient has agreed to proceed.    Eventually, I have explained to her that she will need a 3D tattooing on this right breast NAC.  However we will have to wait at least 3 months for this tattooing so that the inflammation subsides.    Avinash Johnson MD, FACS   Diplomate American Board of Plastic Surgery  Diplomate American Board of Surgery  [facility]  Division of Plastic & Reconstructive Surgery  Office: [phone]   7/3/2024 at 9:23 AM

## 2024-07-03 NOTE — LETTER
7/3/2024      Andres Trivedi  [id number] [address] [address]      Dear Colleague,    Thank you for referring your patient, Andres Trivedi, to the [facility] [place]. Please see a copy of my visit note below.    Chief complaint:  Open wound right breast nipple areolar complex (NAC)    History of present illness:  This is a 39 year old lady who presents with an open wound on her right breast NAC.  She is a status post bilateral breast reduction 7 weeks ago.  She did develop partial full-thickness necrosis of the upper half of her right breast NAC.  This has been treated with necrosectomy as well as local wound care and wound VAC therapy.    Patient is now ready for closure in the operating room.    Past medical history:  Past Medical History:   Diagnosis Date     Chronic bilateral thoracic back pain      Depressive disorder 2019    Was on Lexapro for 2 years. No current medications.     Macromastia      Neck pain      Obesity      Transfusion of blood product declined due to [employer] reason        Past surgical history:  Bilateral breast reduction, irrigation and debridement right breast NAC    Allergies:  No known drug allergies    Medications:    Current Outpatient Medications:      ibuprofen (ADVIL/MOTRIN) 800 MG tablet, Take 1 tablet (800 mg) by mouth every 6 hours as needed for moderate pain, Disp: 30 tablet, Rfl: 0     levonorgestrel (MIRENA) 20 MCG/DAY IUD, 1 each (20 mcg) by Intrauterine route once, Disp: , Rfl:      vitamin C (ASCORBIC ACID) 1000 MG TABS, Take 1,000 mg by mouth daily, Disp: , Rfl:      Zinc Sulfate (ZINC 15 PO), , Disp: , Rfl:      phentermine 15 MG capsule, Take 1 capsule (15 mg) by mouth every morning Due for visit May 2024. (Patient not taking: Reported on 7/3/2024), Disp: 30 capsule, Rfl: 0    Family history:  Noncontributory    Social History:  Denies tobacco, drinks alcohol socially    Review of systems:  General ROS: No complaints or  constitutional symptoms  Skin: No complaints or symptoms   Hematologic/Lymphatic: No symptoms or complaints  Psychiatric: No symptoms or complaints  Endocrine: No excessive fatigue, no hypermetabolic symptoms reported  Respiratory ROS: No cough, shortness of breath, or wheezing  Cardiovascular ROS: No chest pain or dyspnea on exertion  Breast ROS: Open wound right breast NAC  Gastrointestinal ROS: No abdominal pain, nausea, diarrhea, or constipation  Musculoskeletal ROS: No recent injuries reported  Neurological ROS: No focal neurologic defects reported.      Physical exam:  LMP  (LMP Unknown)   General: Alert, cooperative, appears stated age   Skin: Skin color, texture, turgor normal, no rashes or lesions   Lymphatic: No obvious adenopathy, no swelling   Eyes: No scleral icterus, pupils equal  HENT: No traumatic injury to the head or face, no gross abnormalities  Lungs: Normal respiratory effort, breath sounds equal bilaterally  Heart: Regular rate and rhythm  Breasts: Soft tissue defect, full-thickness, with exposed oxidated breast parenchyma in the upper half of her right breast NAC.  No sign of infection like purulence.  The remaining lower half of the NAC is viable.  There is inflammation in the periphery of this open wound.  Vertical limb and horizontal limb of the right breast Soriano pattern is well-healed.  The left breast incisions are also well-healed.  The right breast NAC defect measures approximately 3 cm x 4 cm x 0.5 cm.  He has become significantly shallower in the last few weeks.  Abdomen: Soft, non-distended and non-tender to palpation  Neurologic: Grossly intact      ASSESSMENT:    This is a 39 year old lady with open wound, right breast nipple-areolar complex.    She is a status post bilateral breast reduction and sustained a partial full-thickness necrosis of the upper half of the right breast NAC.      PLAN:      Patient will be taken to the operating room for attempt permanent closure with purse  string closure/complex closure of this right breast NAC.    I have explained to the patient that I cannot guarantee that I will be able to completely close the defect.  However, the defect would become significantly smaller after closure and this could heal by secondary intention.    Patient has agreed to proceed.    Eventually, I have explained to her that she will need a 3D tattooing on this right breast NAC.  However we will have to wait at least 3 months for this tattooing so that the inflammation subsides.    Avinash Johnson MD, FACS   Diplomate American Board of Plastic Surgery  Diplomate American Board of Surgery  [facility]  Division of Plastic & Reconstructive Surgery  Office: [phone]   7/3/2024 at 9:23 AM      Again, thank you for allowing me to participate in the care of your patient.        Sincerely,        Avinash Johnson MD

## 2024-07-04 ENCOUNTER — ANESTHESIA EVENT (OUTPATIENT)
Dept: SURGERY | Facility: HOSPITAL | Age: 39
End: 2024-07-04
Payer: COMMERCIAL

## 2024-07-05 ENCOUNTER — ANESTHESIA (OUTPATIENT)
Dept: SURGERY | Facility: HOSPITAL | Age: 39
End: 2024-07-05
Payer: COMMERCIAL

## 2024-07-05 ENCOUNTER — HOSPITAL ENCOUNTER (OUTPATIENT)
Facility: HOSPITAL | Age: 39
Discharge: HOME | End: 2024-07-05
Attending: PLASTIC SURGERY | Admitting: PLASTIC SURGERY
Payer: COMMERCIAL

## 2024-07-05 VITALS
BODY MASS INDEX: 36.36 KG/M2 | SYSTOLIC BLOOD PRESSURE: 118 MMHG | DIASTOLIC BLOOD PRESSURE: 63 MMHG | WEIGHT: 198.8 LBS | RESPIRATION RATE: 21 BRPM | OXYGEN SATURATION: 94 % | TEMPERATURE: 98 F | HEART RATE: 98 BPM

## 2024-07-05 DIAGNOSIS — N64.59 NIPPLE PROBLEM: Primary | ICD-10-CM

## 2024-07-05 PROCEDURE — 258N000003 HC RX IP 258 OP 636: Performed by: ANESTHESIOLOGY

## 2024-07-05 PROCEDURE — 710N000009 HC RECOVERY PHASE 1, LEVEL 1, PER MIN: Performed by: PLASTIC SURGERY

## 2024-07-05 PROCEDURE — 250N000011 HC RX IP 250 OP 636: Performed by: PLASTIC SURGERY

## 2024-07-05 PROCEDURE — 13101 CMPLX RPR TRUNK 2.6-7.5 CM: CPT | Performed by: ANESTHESIOLOGY

## 2024-07-05 PROCEDURE — 710N000012 HC RECOVERY PHASE 2, PER MINUTE: Performed by: PLASTIC SURGERY

## 2024-07-05 PROCEDURE — 272N000001 HC OR GENERAL SUPPLY STERILE: Performed by: PLASTIC SURGERY

## 2024-07-05 PROCEDURE — 250N000011 HC RX IP 250 OP 636: Performed by: ANESTHESIOLOGY

## 2024-07-05 PROCEDURE — 250N000009 HC RX 250: Performed by: ANESTHESIOLOGY

## 2024-07-05 PROCEDURE — 250N000013 HC RX MED GY IP 250 OP 250 PS 637: Performed by: ANESTHESIOLOGY

## 2024-07-05 PROCEDURE — 13102 CMPLX RPR TRUNK ADDL 5CM/<: CPT | Mod: 78 | Performed by: PLASTIC SURGERY

## 2024-07-05 PROCEDURE — 250N000009 HC RX 250: Performed by: PLASTIC SURGERY

## 2024-07-05 PROCEDURE — 999N000141 HC STATISTIC PRE-PROCEDURE NURSING ASSESSMENT: Performed by: PLASTIC SURGERY

## 2024-07-05 PROCEDURE — 13101 CMPLX RPR TRUNK 2.6-7.5 CM: CPT | Mod: 78 | Performed by: PLASTIC SURGERY

## 2024-07-05 PROCEDURE — 13101 CMPLX RPR TRUNK 2.6-7.5 CM: CPT

## 2024-07-05 PROCEDURE — 360N000075 HC SURGERY LEVEL 2, PER MIN: Performed by: PLASTIC SURGERY

## 2024-07-05 PROCEDURE — 370N000017 HC ANESTHESIA TECHNICAL FEE, PER MIN: Performed by: PLASTIC SURGERY

## 2024-07-05 RX ORDER — ONDANSETRON 4 MG/1
4 TABLET, ORALLY DISINTEGRATING ORAL EVERY 30 MIN PRN
Status: DISCONTINUED | OUTPATIENT
Start: 2024-07-05 | End: 2024-07-05 | Stop reason: HOSPADM

## 2024-07-05 RX ORDER — PHENYLEPHRINE HCL IN 0.9% NACL 50MG/250ML
PLASTIC BAG, INJECTION (ML) INTRAVENOUS
Status: DISCONTINUED
Start: 2024-07-05 | End: 2024-07-05 | Stop reason: HOSPADM

## 2024-07-05 RX ORDER — MAGNESIUM HYDROXIDE 1200 MG/15ML
LIQUID ORAL PRN
Status: DISCONTINUED | OUTPATIENT
Start: 2024-07-05 | End: 2024-07-05 | Stop reason: HOSPADM

## 2024-07-05 RX ORDER — CEFAZOLIN SODIUM/WATER 2 G/20 ML
2 SYRINGE (ML) INTRAVENOUS
Status: COMPLETED | OUTPATIENT
Start: 2024-07-05 | End: 2024-07-05

## 2024-07-05 RX ORDER — MAGNESIUM SULFATE 4 G/50ML
4 INJECTION INTRAVENOUS ONCE
Status: COMPLETED | OUTPATIENT
Start: 2024-07-05 | End: 2024-07-05

## 2024-07-05 RX ORDER — FENTANYL CITRATE 50 UG/ML
25 INJECTION, SOLUTION INTRAMUSCULAR; INTRAVENOUS
Status: DISCONTINUED | OUTPATIENT
Start: 2024-07-05 | End: 2024-07-05 | Stop reason: HOSPADM

## 2024-07-05 RX ORDER — ONDANSETRON 2 MG/ML
4 INJECTION INTRAMUSCULAR; INTRAVENOUS EVERY 30 MIN PRN
Status: DISCONTINUED | OUTPATIENT
Start: 2024-07-05 | End: 2024-07-05 | Stop reason: HOSPADM

## 2024-07-05 RX ORDER — DIMENHYDRINATE 50 MG/ML
25 INJECTION, SOLUTION INTRAMUSCULAR; INTRAVENOUS
Status: DISCONTINUED | OUTPATIENT
Start: 2024-07-05 | End: 2024-07-05 | Stop reason: HOSPADM

## 2024-07-05 RX ORDER — PROPOFOL 10 MG/ML
INJECTION, EMULSION INTRAVENOUS CONTINUOUS PRN
Status: DISCONTINUED | OUTPATIENT
Start: 2024-07-05 | End: 2024-07-05

## 2024-07-05 RX ORDER — GINSENG 100 MG
CAPSULE ORAL PRN
Status: DISCONTINUED | OUTPATIENT
Start: 2024-07-05 | End: 2024-07-05 | Stop reason: HOSPADM

## 2024-07-05 RX ORDER — DEXMEDETOMIDINE HYDROCHLORIDE 4 UG/ML
INJECTION, SOLUTION INTRAVENOUS
Status: DISCONTINUED
Start: 2024-07-05 | End: 2024-07-05 | Stop reason: HOSPADM

## 2024-07-05 RX ORDER — ACETAMINOPHEN 325 MG/1
975 TABLET ORAL ONCE
Status: COMPLETED | OUTPATIENT
Start: 2024-07-05 | End: 2024-07-05

## 2024-07-05 RX ORDER — NALOXONE HYDROCHLORIDE 1 MG/ML
0.1 INJECTION INTRAMUSCULAR; INTRAVENOUS; SUBCUTANEOUS
Status: DISCONTINUED | OUTPATIENT
Start: 2024-07-05 | End: 2024-07-05 | Stop reason: HOSPADM

## 2024-07-05 RX ORDER — OXYCODONE HYDROCHLORIDE 5 MG/1
5 TABLET ORAL
Status: COMPLETED | OUTPATIENT
Start: 2024-07-05 | End: 2024-07-05

## 2024-07-05 RX ORDER — ONDANSETRON 4 MG/1
4 TABLET, ORALLY DISINTEGRATING ORAL EVERY 6 HOURS PRN
Qty: 16 TABLET | Refills: 0 | Status: SHIPPED | OUTPATIENT
Start: 2024-07-05 | End: 2024-07-26

## 2024-07-05 RX ORDER — SODIUM CHLORIDE, SODIUM LACTATE, POTASSIUM CHLORIDE, CALCIUM CHLORIDE 600; 310; 30; 20 MG/100ML; MG/100ML; MG/100ML; MG/100ML
INJECTION, SOLUTION INTRAVENOUS CONTINUOUS
Status: DISCONTINUED | OUTPATIENT
Start: 2024-07-05 | End: 2024-07-05 | Stop reason: HOSPADM

## 2024-07-05 RX ORDER — AMOXICILLIN 250 MG
1-2 CAPSULE ORAL 2 TIMES DAILY
Qty: 30 TABLET | Refills: 0 | Status: SHIPPED | OUTPATIENT
Start: 2024-07-05 | End: 2024-07-12

## 2024-07-05 RX ORDER — HALOPERIDOL 5 MG/ML
1 INJECTION INTRAMUSCULAR
Status: DISCONTINUED | OUTPATIENT
Start: 2024-07-05 | End: 2024-07-05 | Stop reason: HOSPADM

## 2024-07-05 RX ORDER — FENTANYL CITRATE 50 UG/ML
50 INJECTION, SOLUTION INTRAMUSCULAR; INTRAVENOUS EVERY 5 MIN PRN
Status: DISCONTINUED | OUTPATIENT
Start: 2024-07-05 | End: 2024-07-05 | Stop reason: HOSPADM

## 2024-07-05 RX ORDER — FENTANYL CITRATE 50 UG/ML
25 INJECTION, SOLUTION INTRAMUSCULAR; INTRAVENOUS EVERY 5 MIN PRN
Status: DISCONTINUED | OUTPATIENT
Start: 2024-07-05 | End: 2024-07-05 | Stop reason: HOSPADM

## 2024-07-05 RX ORDER — KETAMINE HYDROCHLORIDE 10 MG/ML
INJECTION INTRAMUSCULAR; INTRAVENOUS PRN
Status: DISCONTINUED | OUTPATIENT
Start: 2024-07-05 | End: 2024-07-05

## 2024-07-05 RX ORDER — FENTANYL CITRATE 50 UG/ML
INJECTION, SOLUTION INTRAMUSCULAR; INTRAVENOUS PRN
Status: DISCONTINUED | OUTPATIENT
Start: 2024-07-05 | End: 2024-07-05

## 2024-07-05 RX ORDER — HYDROCODONE BITARTRATE AND ACETAMINOPHEN 5; 325 MG/1; MG/1
1-2 TABLET ORAL EVERY 4 HOURS PRN
Qty: 20 TABLET | Refills: 0 | Status: SHIPPED | OUTPATIENT
Start: 2024-07-05 | End: 2024-07-26

## 2024-07-05 RX ORDER — NALOXONE HYDROCHLORIDE 0.4 MG/ML
0.1 INJECTION, SOLUTION INTRAMUSCULAR; INTRAVENOUS; SUBCUTANEOUS
Status: DISCONTINUED | OUTPATIENT
Start: 2024-07-05 | End: 2024-07-05 | Stop reason: HOSPADM

## 2024-07-05 RX ORDER — DEXAMETHASONE SODIUM PHOSPHATE 10 MG/ML
INJECTION, SOLUTION INTRAMUSCULAR; INTRAVENOUS PRN
Status: DISCONTINUED | OUTPATIENT
Start: 2024-07-05 | End: 2024-07-05

## 2024-07-05 RX ORDER — OXYCODONE HYDROCHLORIDE 5 MG/1
10 TABLET ORAL
Status: DISCONTINUED | OUTPATIENT
Start: 2024-07-05 | End: 2024-07-05 | Stop reason: HOSPADM

## 2024-07-05 RX ORDER — CEPHALEXIN 500 MG/1
500 CAPSULE ORAL 3 TIMES DAILY
Qty: 9 CAPSULE | Refills: 0 | Status: SHIPPED | OUTPATIENT
Start: 2024-07-05 | End: 2024-07-08

## 2024-07-05 RX ORDER — PROPOFOL 10 MG/ML
INJECTION, EMULSION INTRAVENOUS PRN
Status: DISCONTINUED | OUTPATIENT
Start: 2024-07-05 | End: 2024-07-05

## 2024-07-05 RX ORDER — LIDOCAINE 40 MG/G
CREAM TOPICAL
Status: DISCONTINUED | OUTPATIENT
Start: 2024-07-05 | End: 2024-07-05 | Stop reason: HOSPADM

## 2024-07-05 RX ADMIN — FENTANYL CITRATE 25 MCG: 50 INJECTION, SOLUTION INTRAMUSCULAR; INTRAVENOUS at 09:50

## 2024-07-05 RX ADMIN — KETAMINE HYDROCHLORIDE 40 MG: 10 INJECTION INTRAMUSCULAR; INTRAVENOUS at 07:30

## 2024-07-05 RX ADMIN — PHENYLEPHRINE HYDROCHLORIDE 100 MCG: 10 INJECTION INTRAVENOUS at 07:31

## 2024-07-05 RX ADMIN — PHENYLEPHRINE HYDROCHLORIDE 100 MCG: 10 INJECTION INTRAVENOUS at 08:08

## 2024-07-05 RX ADMIN — FENTANYL CITRATE 100 MCG: 50 INJECTION INTRAMUSCULAR; INTRAVENOUS at 07:30

## 2024-07-05 RX ADMIN — ACETAMINOPHEN 975 MG: 325 TABLET ORAL at 06:51

## 2024-07-05 RX ADMIN — MIDAZOLAM 2 MG: 1 INJECTION INTRAMUSCULAR; INTRAVENOUS at 07:23

## 2024-07-05 RX ADMIN — OXYCODONE HYDROCHLORIDE 5 MG: 5 TABLET ORAL at 10:03

## 2024-07-05 RX ADMIN — ONDANSETRON 4 MG: 2 INJECTION INTRAMUSCULAR; INTRAVENOUS at 08:31

## 2024-07-05 RX ADMIN — MAGNESIUM SULFATE HEPTAHYDRATE 4 G: 80 INJECTION, SOLUTION INTRAVENOUS at 06:45

## 2024-07-05 RX ADMIN — LIDOCAINE HYDROCHLORIDE 50 MG: 10 INJECTION, SOLUTION INFILTRATION; PERINEURAL at 07:29

## 2024-07-05 RX ADMIN — PHENYLEPHRINE HYDROCHLORIDE 100 MCG: 10 INJECTION INTRAVENOUS at 07:53

## 2024-07-05 RX ADMIN — PHENYLEPHRINE HYDROCHLORIDE 100 MCG: 10 INJECTION INTRAVENOUS at 07:50

## 2024-07-05 RX ADMIN — SODIUM CHLORIDE, POTASSIUM CHLORIDE, SODIUM LACTATE AND CALCIUM CHLORIDE: 600; 310; 30; 20 INJECTION, SOLUTION INTRAVENOUS at 06:52

## 2024-07-05 RX ADMIN — Medication 2 G: at 07:23

## 2024-07-05 RX ADMIN — PROPOFOL 180 MCG/KG/MIN: 10 INJECTION, EMULSION INTRAVENOUS at 07:32

## 2024-07-05 RX ADMIN — SODIUM CHLORIDE, POTASSIUM CHLORIDE, SODIUM LACTATE AND CALCIUM CHLORIDE: 600; 310; 30; 20 INJECTION, SOLUTION INTRAVENOUS at 09:02

## 2024-07-05 RX ADMIN — HYDROMORPHONE HYDROCHLORIDE 0.3 MG: 1 INJECTION, SOLUTION INTRAMUSCULAR; INTRAVENOUS; SUBCUTANEOUS at 09:04

## 2024-07-05 RX ADMIN — FENTANYL CITRATE 25 MCG: 50 INJECTION, SOLUTION INTRAMUSCULAR; INTRAVENOUS at 09:56

## 2024-07-05 RX ADMIN — PROPOFOL 200 MG: 10 INJECTION, EMULSION INTRAVENOUS at 07:30

## 2024-07-05 RX ADMIN — DEXAMETHASONE SODIUM PHOSPHATE 10 MG: 10 INJECTION, SOLUTION INTRAMUSCULAR; INTRAVENOUS at 07:31

## 2024-07-05 RX ADMIN — KETAMINE HYDROCHLORIDE 10 MG: 10 INJECTION INTRAMUSCULAR; INTRAVENOUS at 08:23

## 2024-07-05 ASSESSMENT — ACTIVITIES OF DAILY LIVING (ADL)
ADLS_ACUITY_SCORE: 22
ADLS_ACUITY_SCORE: 22
ADLS_ACUITY_SCORE: 32
ADLS_ACUITY_SCORE: 22

## 2024-07-05 NOTE — INTERVAL H&P NOTE
I have reviewed the surgical (or preoperative) H&P that is linked to this encounter, and examined the patient. There are no significant changes    Clinical Conditions Present on Arrival:  Clinically Significant Risk Factors Present on Admission                       Avinash Johnson MD , FACS   Diplomate American Board of Plastic Surgery  Diplomate American Board of Surgery  Adj. Assistant Professor of Surgery  Division of Plastic & Reconstructive Surgery   [facility]  Office: [phone]   7/5/2024 at 6:52 AM

## 2024-07-05 NOTE — ANESTHESIA PROCEDURE NOTES
Airway       Patient location during procedure: OR  Staff -        CRNA: Norberto Aguirre MD       Performed By: anesthesiologist  Consent for Airway        Urgency: elective  Indications and Patient Condition       Indications for airway management: adelia-procedural       Induction type:intravenous       Mask difficulty assessment: 0 - not attempted    Final Airway Details       Final airway type: supraglottic airway    Supraglottic Airway Details        Type: LMA       Brand: Ambu AuraGain       LMA size: 4    Post intubation assessment        Placement verified by: capnometry, equal breath sounds and chest rise        Number of attempts at approach: 1       Number of other approaches attempted: 0       Secured with: commercial tube hamilton and tape       Ease of procedure: easy       Dentition: Intact and Unchanged

## 2024-07-05 NOTE — OP NOTE
July 5, 2024    Andres Trivedi      Preoperative diagnosis: Chronic open wound, right breast nipple areolar complex (NAC)     Postoperative diagnosis: same     Procedure:    1) Irrigation and debridement right breast open wound on the NAC    2) Complex closure, 9 cm length, right breast NAC      Surgeon: Avinash Johnson MD.     Assistant: Barbara Tipton CSA (there was no plastic surgery resident available to assist).     Anesthesia: General and local, utilizing lidocaine 1% with epinephrine, 18 mL, in the periphery of the right breast NAC     IV fluids: 1000 mL     Findings: Open chronic wound, right breast NAC, with necrotic and oxidated breast parenchyma just cranial to the remaining nipple.  No sign of infection.         Specimen: None     Drains: #10 Yoruba Blaine drain right breast     Disposition: Patient tolerated procedure well, she was then extubated and transferred to recovery room awake and in stable condition.     Indications:  This is a 39 year old lady who presents with an open wound on her right breast NAC.  She is a status post bilateral breast reduction 7 weeks ago.  She did develop partial full-thickness necrosis of the upper half of her right breast NAC.  This has been treated with necrosectomy as well as local wound care and wound VAC therapy.     Patient is now ready for closure in the operating room.    I have explained to the patient that I cannot guarantee that I will be able to completely close the defect.  However, the defect would become significantly smaller after closure and this could heal by secondary intention.     Patient has agreed to proceed.    Procedure:    Patient was identified in the preoperative holding area and preoperative antibiotics were given to the patient.    She was then brought back to the operating room and was placed supine on the operating room table.  After general anesthesia was obtained, the right breast NAC was cleansed with alcohol swab and I proceeded to  infiltrate 18 mL of lidocaine 1% with epinephrine in the periphery of the right breast NAC wound.    The right breast was then prepped and draped in sterile surgical fashion.    Utilizing a scalpel #15, I proceeded to excise approximately 3 mm width, full-thickness, of the chronic scar around the periphery of this open wound.    There was evidence of necrotic breast parenchyma in the center of the wound and just cranial to the remaining nipple.  I proceeded to excise this necrotic tissue with electrocautery down to the underlying pectoralis major muscle fascia underneath.    This necrotic tissue was discarded.    At this time, copious irrigation with antibiotic solution made of 1 g of Ancef and 80 mg of gentamicin was provided to the wound followed by chlorhexidine.    I then proceeded to perform extensive undermining with electrocautery in the periphery of this open wound, I undermined approximately 2 to 3 cm from the edges of the wound.  Part of the undermining was performed on the breast parenchyma.    Once again, irrigation was provided and I found that hemostasis was excellent.    A small stab incision with a scalpel #15 was performed on the lateral aspect of the horizontal limb of the Wise pattern and a 10 Afghan Blaine drain was introduced and secured to the skin with 3-0 silk suture.    The breast parenchyma was approximated with multiple 2-0 PDS buried interrupted stitches.  The subcutaneous tissues were also approximated with 3-0 Monocryl buried interrupted stitches.  I then proceeded to  apply 2-0 STRATAFIX subcuticular stitches in the periphery of this open wound as a pursue string suture.  Finally the skin was loosely approximated with 3-0 Prolene simple and multiple interrupted stitches.    Length of closure was 9 cm.    Instrument count was reported by nursing personnel as correct.    Patient tolerated procedure well, she was then extubated and transferred to recovery room awake and in stable  condition.    Avinash Johnson MD , FACS   Diplomate American Board of Plastic Surgery  Diplomate American Board of Surgery  Adj. Assistant Professor of Surgery  Division of Plastic & Reconstructive Surgery   [facility]  Office: [phone]   7/5/2024 at 3:31 PM

## 2024-07-05 NOTE — ANESTHESIA CARE TRANSFER NOTE
Patient: Andres Trivedi    Procedure: Procedure(s):  Complex closure right breast wound       Diagnosis: Nipple problem [N64.59]  Diagnosis Additional Information: No value filed.    Anesthesia Type:   General     Note:    Oropharynx: oropharynx clear of all foreign objects  Level of Consciousness: awake  Oxygen Supplementation: face mask  Level of Supplemental Oxygen (L/min / FiO2): 6  Independent Airway: airway patency satisfactory and stable  Dentition: dentition unchanged  Vital Signs Stable: post-procedure vital signs reviewed and stable  Report to RN Given: handoff report given  Patient transferred to: PACU    Handoff Report: Identifed the Patient, Identified the Reponsible Provider, Reviewed the pertinent medical history, Discussed the surgical course, Reviewed Intra-OP anesthesia mangement and issues during anesthesia, Set expectations for post-procedure period and Allowed opportunity for questions and acknowledgement of understanding      Vitals:  Vitals Value Taken Time   /58 07/05/24 0922   Temp     Pulse 107 07/05/24 0923   Resp 16    SpO2 97 % 07/05/24 0923   Vitals shown include unfiled device data.    Electronically Signed By: ANIL Barrera CRNA  July 5, 2024  9:25 AM

## 2024-07-05 NOTE — ANESTHESIA PREPROCEDURE EVALUATION
Anesthesia Pre-Procedure Evaluation    Patient: Andres Trivedi   MRN: [id number] : 1985        Procedure : Procedure(s):  Complex closure right breast wound          Past Medical History:   Diagnosis Date    Chronic bilateral thoracic back pain     Depressive disorder 2019    Was on Lexapro for 2 years. No current medications.    Macromastia     Neck pain     Obesity     Transfusion of blood product declined due to Jew reason       Past Surgical History:   Procedure Laterality Date    APPLY WOUND VAC Bilateral 2024    Procedure: APPLICATION OF WOUND VACUUM RIGHT NIPPLE AREOLA COMPLEX, SIMPLE  CLOSURE 11.5 CM OF LEFT AREOLA COMPLEX;  Surgeon: Avinash Johnson MD;  [place] [facility] [place]    IRRIGATION AND DEBRIDEMENT BREAST Right 2024    Procedure: Irrigation and sharp debridement of necrotic right breast nipple areolar complex;  Surgeon: Avinash Johnson MD;  [place] [facility] [place]    MAMMOPLASTY REDUCTION Bilateral 2024    Procedure: MAMMOPLASTY, REDUCTION BILATERAL;  Surgeon: Avinash Johnson MD;  [place] [facility] [place]      No Known Allergies   Social History     Tobacco Use    Smoking status: Never     Passive exposure: Never    Smokeless tobacco: Never    Tobacco comments:     no smokers in the household   Substance Use Topics    Alcohol use: Yes     Alcohol/week: 1.0 standard drink of alcohol     Types: 1 Standard drinks or equivalent per week     Comment: occ.- social. some weeks none. 1-2 socially.      Wt Readings from Last 1 Encounters:   24 90.2 kg (198 lb 12.8 oz)        Anesthesia Evaluation   Pt has had prior anesthetic.     No history of anesthetic complications       ROS/MED HX  ENT/Pulmonary:  - neg pulmonary ROS     Neurologic:  - neg neurologic ROS     Cardiovascular:  - neg cardiovascular ROS     METS/Exercise Tolerance: >4 METS    Hematologic:  - neg hematologic  ROS     Musculoskeletal:  - neg musculoskeletal ROS     GI/Hepatic:  - neg GI/hepatic  "ROS     Renal/Genitourinary:  - neg Renal ROS     Endo:     (+)               Obesity,       Psychiatric/Substance Use:  - neg psychiatric ROS     Infectious Disease:  - neg infectious disease ROS     Malignancy:  - neg malignancy ROS     Other:  - neg other ROS          Physical Exam    Airway  airway exam normal      Mallampati: II   TM distance: > 3 FB   Neck ROM: full   Mouth opening: > 3 cm    Respiratory Devices and Support         Dental  no notable dental history         Cardiovascular   cardiovascular exam normal          Pulmonary   pulmonary exam normal                OUTSIDE LABS:  CBC:   Lab Results   Component Value Date    WBC 8.7 02/24/2023    HGB 13.1 02/24/2023    HCT 40.3 02/24/2023     02/24/2023     BMP:   Lab Results   Component Value Date     02/24/2023    POTASSIUM 4.2 02/24/2023    CHLORIDE 110 (H) 02/24/2023    CO2 26 02/24/2023    BUN 10 02/24/2023    CR 0.66 02/24/2023    GLC 91 05/15/2024     (H) 05/14/2024     COAGS: No results found for: \"PTT\", \"INR\", \"FIBR\"  POC: No results found for: \"BGM\", \"HCG\", \"HCGS\"  HEPATIC: No results found for: \"ALBUMIN\", \"PROTTOTAL\", \"ALT\", \"AST\", \"GGT\", \"ALKPHOS\", \"BILITOTAL\", \"BILIDIRECT\", \"CRYSTAL\"  OTHER:   Lab Results   Component Value Date    HENNA 8.6 02/24/2023    TSH 1.03 02/24/2023       Anesthesia Plan    ASA Status:  2    NPO Status:  NPO Appropriate    Anesthesia Type: General.     - Airway: ETT   Induction: Intravenous, Propofol.   Maintenance: TIVA.        Consents    Anesthesia Plan(s) and associated risks, benefits, and realistic alternatives discussed. Questions answered and patient/representative(s) expressed understanding.     - Discussed:     - Discussed with:  Patient      - Extended Intubation/Ventilatory Support Discussed: No.      - Patient is DNR/DNI Status: No     Use of blood products discussed: No .     Postoperative Care    Pain management: IV analgesics, Multi-modal analgesia.     - Plan for long acting post-op " opioid use   PONV prophylaxis: Ondansetron (or other 5HT-3), Dexamethasone or Solumedrol, Droperidol or Haldol     Comments:    Other Comments: 4 grams magnesium IV.  40 mg ketamine IV on induction.             Norberto Aguirre MD    I have reviewed the pertinent notes and labs in the chart from the past 30 days and (re)examined the patient.  Any updates or changes from those notes are reflected in this note.

## 2024-07-05 NOTE — ANESTHESIA POSTPROCEDURE EVALUATION
Patient: Andres Trivedi    Procedure: Procedure(s):  Complex closure right breast wound       Anesthesia Type:  General    Note:  Disposition: Outpatient   Postop Pain Control: Uneventful            Sign Out: Well controlled pain   PONV: No   Neuro/Psych: Uneventful            Sign Out: Acceptable/Baseline neuro status   Airway/Respiratory: Uneventful            Sign Out: Acceptable/Baseline resp. status   CV/Hemodynamics: Uneventful            Sign Out: Acceptable CV status; No obvious hypovolemia; No obvious fluid overload   Other NRE: NONE   DID A NON-ROUTINE EVENT OCCUR? No           Last vitals:  Vitals Value Taken Time   /70 07/05/24 1015   Temp 36.5  C (97.7  F) 07/05/24 1015   Pulse 92 07/05/24 1020   Resp 14 07/05/24 1020   SpO2 93 % 07/05/24 1020   Vitals shown include unfiled device data.    Electronically Signed By: Boris Venegas MD  July 5, 2024  12:42 PM

## 2024-07-09 ENCOUNTER — DOCUMENTATION ONLY (OUTPATIENT)
Dept: PLASTIC SURGERY | Facility: CLINIC | Age: 39
End: 2024-07-09
Payer: COMMERCIAL

## 2024-07-09 NOTE — PROGRESS NOTES
Wound vac has not yet been picked up. [facility] [employer] is not able to  so they will have a rep from the company to pick it up. My direct line was given to be called once  has been established.     Ashley Vega RN on 7/9/2024 at 2:55 PM

## 2024-07-12 ENCOUNTER — DOCUMENTATION ONLY (OUTPATIENT)
Dept: PLASTIC SURGERY | Facility: CLINIC | Age: 39
End: 2024-07-12

## 2024-07-12 ENCOUNTER — OFFICE VISIT (OUTPATIENT)
Dept: PLASTIC SURGERY | Facility: AMBULATORY SURGERY CENTER | Age: 39
End: 2024-07-12
Payer: COMMERCIAL

## 2024-07-12 DIAGNOSIS — Z98.890 STATUS POST BILATERAL BREAST REDUCTION: Primary | ICD-10-CM

## 2024-07-12 PROCEDURE — 99024 POSTOP FOLLOW-UP VISIT: CPT | Performed by: PLASTIC SURGERY

## 2024-07-12 NOTE — PROGRESS NOTES
Andres is a 39 years old lady status post complex closure of right breast open wound.  She is 7 days out from surgery.  She is doing well.    At the physical exam, dressings has been removed as well of right breast Blaine drain secondary to low output.  The wound where the nipple areolar complex used to be located is completely closed.  No sign of infection or wound dehiscence.  There is no evidence of hematoma or seroma.  Patient does present with edema and swelling on the right breast which is expected after her recent surgery.    Plan: May have regular showers, start applying either cocoa butter or Aquaphor along the scars to prevent dry skin and follow-up with me in 2 weeks for removal of stitches.  Patient is doing well from plastic surgery standpoint.    Avinash Johnson MD , FACS   Diplomate American Board of Plastic Surgery  Diplomate American Board of Surgery  Adj. Assistant Professor of Surgery  Division of Plastic & Reconstructive Surgery   [facility]  Office: [phone]   7/12/2024 at 8:46 AM    [Dear  ___] : Dear  [unfilled], [Consult Letter:] : I had the pleasure of evaluating your patient, [unfilled]. [Please see my note below.] : Please see my note below. [Consult Closing:] : Thank you very much for allowing me to participate in the care of this patient.  If you have any questions, please do not hesitate to contact me. [Sincerely,] : Sincerely, [DrRosalie  ___] : Dr. TILLMAN [FreeTextEntry3] : Clarence Mccarthy MD, FACS\par Professor of Otolaryngology, [facility] at [facility]\par Director, [facility], Department of Otolaryngology, [facility]\par , Head & Neck Service Line, [facility]\par

## 2024-07-12 NOTE — LETTER
7/12/2024      Andres Trivedi  [address] [address] [facility] [address]      Dear Colleague,    Thank you for referring your patient, Andres Trivedi, to the [facility] [place]. Please see a copy of my visit note below.    Andres is a 39 years old lady status post complex closure of right breast open wound.  She is 7 days out from surgery.  She is doing well.    At the physical exam, dressings has been removed as well of right breast Blaine drain secondary to low output.  The wound where the nipple areolar complex used to be located is completely closed.  No sign of infection or wound dehiscence.  There is no evidence of hematoma or seroma.  Patient does present with edema and swelling on the right breast which is expected after her recent surgery.    Plan: May have regular showers, start applying either cocoa butter or Aquaphor along the scars to prevent dry skin and follow-up with me in 2 weeks for removal of stitches.  Patient is doing well from plastic surgery standpoint.    Avinash Johnson MD , FACS   Diplomate American Board of Plastic Surgery  Diplomate American Board of Surgery  Adj. Assistant Professor of Surgery  Division of Plastic & Reconstructive Surgery   [facility]  Office: [phone]   7/12/2024 at 8:46 AM       Again, thank you for allowing me to participate in the care of your patient.        Sincerely,        Avinash Johnson MD

## 2024-07-12 NOTE — PROGRESS NOTES
Leandro Gonzalez from [facility] called to inform the clinic that they will be sending someone to  the wound vac today.      team notified.     Ashley Vega RN on 7/12/2024 at 9:06 AM

## 2024-07-26 ENCOUNTER — OFFICE VISIT (OUTPATIENT)
Dept: PLASTIC SURGERY | Facility: AMBULATORY SURGERY CENTER | Age: 39
End: 2024-07-26
Payer: COMMERCIAL

## 2024-07-26 DIAGNOSIS — Z98.890 STATUS POST BILATERAL BREAST REDUCTION: Primary | ICD-10-CM

## 2024-07-26 PROCEDURE — 99024 POSTOP FOLLOW-UP VISIT: CPT | Performed by: PLASTIC SURGERY

## 2024-07-26 NOTE — PROGRESS NOTES
Andres is a 39 years old lady status post bilateral reduction mammoplasty and history of right breast nipple areolar complex necrosis status post debridement and complex closure of this right breast nipple areolar complex.  She is almost 4 weeks out from this complex closure.  She is doing well.    At the physical exam, all incisions are well-healed.  No sign of hypertrophic scarring or keloid.  The nipple-areolar complex on the right breast is completely healed.  Patient present with good shape and symmetry bilaterally.                          Plan: Continue to apply cocoa butter lotion along the incisions, continue with scar massage, resume all normal activities.  May begin swimming in 1 month.  Follow-up with our PAs in 3-month.  She is doing excellent from plastic surgery standpoint and she is happy with her results.  We discussed of possible 3D tattooing of her right breast nipple areolar complex in 3 to 6-month.    Avinash Johnson MD , FACS   Diplomate American Board of Plastic Surgery  Diplomate American Board of Surgery  Adj. Assistant Professor of Surgery  Division of Plastic & Reconstructive Surgery   [facility]  Office: [phone]   7/26/2024 at 8:59 AM

## 2024-07-26 NOTE — NURSING NOTE
Return to work letter written faxed to [facility] at [phone].    Ashley Vega RN on 7/26/2024 at 9:00 AM

## 2024-07-26 NOTE — LETTER
7/26/2024      Andres Trivedi  [address] [facility] [address]      Dear Colleague,    Thank you for referring your patient, Andres Trivedi, to the [facility]ERY [facility] [place]. Please see a copy of my visit note below.    Andres is a 39 years old lady status post bilateral reduction mammoplasty and history of right breast nipple areolar complex necrosis status post debridement and complex closure of this right breast nipple areolar complex.  She is almost 4 weeks out from this complex closure.  She is doing well.    At the physical exam, all incisions are well-healed.  No sign of hypertrophic scarring or keloid.  The nipple-areolar complex on the right breast is completely healed.  Patient present with good shape and symmetry bilaterally.                          Plan: Continue to apply cocoa butter lotion along the incisions, continue with scar massage, resume all normal activities.  May begin swimming in 1 month.  Follow-up with our PAs in 3-month.  She is doing excellent from plastic surgery standpoint and she is happy with her results.  We discussed of possible 3D tattooing of her right breast nipple areolar complex in 3 to 6-month.    Avinash Johnson MD , FACS   Diplomate American Board of Plastic Surgery  Diplomate American Board of Surgery  Adj. Assistant Professor of Surgery  Division of Plastic & Reconstructive Surgery   [facility]  Office: [phone]   7/26/2024 at 8:59 AM       Again, thank you for allowing me to participate in the care of your patient.        Sincerely,        Avinash Johnson MD

## 2024-07-26 NOTE — LETTER
[facility]LAST[facility] [place] [id number], [address]  [place] [address]  [phone]          July 26, 2024    RE:  Andres Trivedi                                                                                                                                                       [address] [address] [facility] [address]            To whom it may concern:    Andres Trivedi is under my professional care for a surgical wound closure on 07/05/2024. She is able to return to work full time without any restrictions.           Sincerely,        Avinash Johnson MD

## 2024-07-29 ENCOUNTER — TELEPHONE (OUTPATIENT)
Dept: PLASTIC SURGERY | Facility: CLINIC | Age: 39
End: 2024-07-29
Payer: COMMERCIAL

## 2024-07-29 NOTE — TELEPHONE ENCOUNTER
Left Voicemail (1st Attempt), sent myc for the patient to call back and schedule the following:    Appointment type: Return Plastic Surgery  Provider: Keisha Franks or Gardenia Anderson  Return date: 3 months - October 2024  Specialty phone number: [phone]  Additional appointment(s) needed: n/a  Additonal Notes: per staff message from Keisha Franks/Dr. Johnson  For / Appt Notes: final follow-up in 3-month per Dr. Johnson    Left direct #

## 2024-07-31 NOTE — TELEPHONE ENCOUNTER
Pt lvm to schedule. Writer reached out and talked to pt      Patient confirmed scheduled appointment:  Date: 10/22/24  Time: 8:40 am  Visit type: Return Plastic Surgery  Provider: Keisha Franks  Location: [facility]  Testing/imaging: n/a  Additional notes: n/a

## 2024-08-27 DIAGNOSIS — E66.812 CLASS 2 OBESITY DUE TO EXCESS CALORIES WITH BODY MASS INDEX (BMI) OF 38.0 TO 38.9 IN ADULT, UNSPECIFIED WHETHER SERIOUS COMORBIDITY PRESENT: ICD-10-CM

## 2024-08-27 DIAGNOSIS — E66.09 CLASS 2 OBESITY DUE TO EXCESS CALORIES WITH BODY MASS INDEX (BMI) OF 38.0 TO 38.9 IN ADULT, UNSPECIFIED WHETHER SERIOUS COMORBIDITY PRESENT: ICD-10-CM

## 2024-08-30 RX ORDER — PHENTERMINE HYDROCHLORIDE 15 MG/1
15 CAPSULE ORAL EVERY MORNING
Qty: 30 CAPSULE | Refills: 0 | OUTPATIENT
Start: 2024-08-30

## 2024-08-30 RX ORDER — PHENTERMINE HYDROCHLORIDE 15 MG/1
15 CAPSULE ORAL EVERY MORNING
Qty: 30 CAPSULE | Refills: 0 | Status: SHIPPED | OUTPATIENT
Start: 2024-08-30 | End: 2024-09-26

## 2024-08-30 NOTE — TELEPHONE ENCOUNTER
Pt scheduled for upcoming visit.  Filling bridge to get to appt.   Future Appointments 8/30/2024 - 2/26/2025        Date Visit Type Length Department Provider     9/26/2024  7:30 AM OFFICE VISIT 30 min [facility] Paulina Phipps PA-C    Location Instructions:     [facility] is located at [address] [address], one mile north of the [address] [facility] [address] exit off of [place] [address]. From [address] [facility], exit to turn west on [address] [place] [address], then turn south on [address].               10/22/2024  8:40 AM RETURN PLASTIC SURGERY 20 min [facility] Keisha Franks PA-C    Location Instructions:     The Clinics and Surgery Center ([facility]) is in a dense urban area with multiple transportation and parking options. You may wish to review options for  service and self-parking in more detail on the [facility] s website at [email or web address].&nbsp;                        Paulina Phipps PA-C

## 2024-09-21 ENCOUNTER — MYC MEDICAL ADVICE (OUTPATIENT)
Dept: FAMILY MEDICINE | Facility: CLINIC | Age: 39
End: 2024-09-21
Payer: COMMERCIAL

## 2024-09-26 ENCOUNTER — OFFICE VISIT (OUTPATIENT)
Dept: FAMILY MEDICINE | Facility: CLINIC | Age: 39
End: 2024-09-26
Payer: COMMERCIAL

## 2024-09-26 VITALS
HEIGHT: 63 IN | RESPIRATION RATE: 14 BRPM | OXYGEN SATURATION: 100 % | BODY MASS INDEX: 35.53 KG/M2 | WEIGHT: 200.5 LBS | TEMPERATURE: 97.2 F | DIASTOLIC BLOOD PRESSURE: 72 MMHG | SYSTOLIC BLOOD PRESSURE: 115 MMHG | HEART RATE: 105 BPM

## 2024-09-26 DIAGNOSIS — E66.812 CLASS 2 OBESITY DUE TO EXCESS CALORIES WITHOUT SERIOUS COMORBIDITY WITH BODY MASS INDEX (BMI) OF 35.0 TO 35.9 IN ADULT: Primary | ICD-10-CM

## 2024-09-26 DIAGNOSIS — Z98.890 S/P BILATERAL BREAST REDUCTION: ICD-10-CM

## 2024-09-26 DIAGNOSIS — E66.09 CLASS 2 OBESITY DUE TO EXCESS CALORIES WITHOUT SERIOUS COMORBIDITY WITH BODY MASS INDEX (BMI) OF 35.0 TO 35.9 IN ADULT: Primary | ICD-10-CM

## 2024-09-26 PROCEDURE — 90656 IIV3 VACC NO PRSV 0.5 ML IM: CPT | Performed by: PHYSICIAN ASSISTANT

## 2024-09-26 PROCEDURE — 99214 OFFICE O/P EST MOD 30 MIN: CPT | Mod: 25 | Performed by: PHYSICIAN ASSISTANT

## 2024-09-26 PROCEDURE — 90480 ADMN SARSCOV2 VAC 1/ONLY CMP: CPT | Performed by: PHYSICIAN ASSISTANT

## 2024-09-26 PROCEDURE — 90471 IMMUNIZATION ADMIN: CPT | Performed by: PHYSICIAN ASSISTANT

## 2024-09-26 PROCEDURE — 91320 SARSCV2 VAC 30MCG TRS-SUC IM: CPT | Performed by: PHYSICIAN ASSISTANT

## 2024-09-26 RX ORDER — PHENTERMINE HYDROCHLORIDE 15 MG/1
15 CAPSULE ORAL EVERY MORNING
Qty: 30 CAPSULE | Refills: 1 | Status: SHIPPED | OUTPATIENT
Start: 2024-09-26

## 2024-09-26 RX ORDER — PHENTERMINE HYDROCHLORIDE 15 MG/1
15 CAPSULE ORAL EVERY MORNING
Qty: 30 CAPSULE | Refills: 0 | Status: CANCELLED | OUTPATIENT
Start: 2024-09-26

## 2024-09-26 ASSESSMENT — PAIN SCALES - GENERAL: PAINLEVEL: NO PAIN (0)

## 2024-09-26 NOTE — PATIENT INSTRUCTIONS
Instructions from Today's Visit:  Please send me a [employer] Message in 4 & 8 weeks with the following information:    What is your current medication dose?  Are you having any side effects or concerns with the medication?   What is your current weight?  What are you doing for exercise?  Tell me a little bit about your eating habits     Thank you!      General Instructions After Your Visit  If you have been seen for a concern and are worse or not improving, please schedule a follow-up visit or reach out to a member of our care team or nurses if it is urgent.  Nurse advice is available 24/7 by calling [phone].  For emergencies, please call [phone].    Test results  You may see your lab or test results before we can make recommendations. This is common, as sometimes we are awaiting other labs to return or we are out of office on a particular day. Please be patient, and if you don't see a response from me or one of my colleagues within 2-3 business days, and you have a specific concern, please send us a message.    Refills  If you have run out of refills, please schedule follow-up visits. This is generally an indication that you are due for a follow-up visit. All prescriptions are only valid for 1 year and need a visit annually. Most mental health and chronic diseases we are treating (diabetes, high blood pressure, etc) require some type of visit every 6 months. We are now offering telephone or video visits! However, if a physical exams are needed or it is a complex concern, we may ask you to be seen in person.    Physicals & Preventative Visits   These appointment slots fill up fast. Please consider scheduling these 2-3 months in advance to allow for the appointment time that fits you best. If you have medications ordered or other issues addressed that are not preventive at these visits, please be aware there are extra costs associated with this.

## 2024-09-26 NOTE — PROGRESS NOTES
"  Assessment & Plan     Class 2 obesity due to excess calories without serious comorbidity with body mass index (BMI) of 35.0 to 35.9 in adult  Has tolerated phentermine well. Took a few months off around time of her breast reduction surgery. Restarted a few weeks ago. Finding it helpful. Tolerating well. No side effects. Refilled 15mg dose for 30d with 1 refill. She will send MyCHart updates monthly. Could consider dose increase if needed. Cont healthy eating and exercise habits. Recheck in 3 mo.   - phentermine 15 MG capsule; Take 1 capsule (15 mg) by mouth every morning.    S/P bilateral breast reduction  She has a firm area in the right breast that has been present since her surgery. Size is stable, not growing/changing. No infection concerns. Suspect scar tissue , healing tissue. She is seeing the plastic surgery team for follow up in 3 months. Will reach out to see if they would like any imaging prior to the visit.           BMI  Estimated body mass index is 35.97 kg/m  as calculated from the following:    Height as of this encounter: 1.59 m (5' 2.6\").    Weight as of this encounter: 90.9 kg (200 lb 8 oz).   Weight management plan: as above      Follow Up: see above. Additionally patient was instructed to contact clinic for worsening symptoms, non-improvement in time frame discussed, and for questions regarding treatment plan.   For virtual visits, the patient was advised to be seen for in person evaluation if symptoms or condition are worsening or non-improvement as expected.   CRISTIAN Humphrey   Andres is a 39 year old, presenting for the following health issues:  Recheck Medication      9/26/2024     7:22 AM   Additional Questions   Roomed by AG   Accompanied by self     History of Present Illness       Reason for visit:  Med check, Flu & Covid shot    She eats 4 or more servings of fruits and vegetables daily.She consumes 0 sweetened beverage(s) daily.She exercises with enough effort " "to increase her heart rate 20 to 29 minutes per day.  She exercises with enough effort to increase her heart rate 4 days per week.   She is taking medications regularly.       Medication Followup of phentermine  Taking Medication as prescribed: yes  Side Effects:  None  Medication Helping Symptoms:  yes  Took break with phentermine prior breast reduction surgery and post-op. She restarted about 4 weeks ago mid-August.  When I was off it , I noticed a difference- days where I constantly wanted to eat/snack. Since being back on it, that urge is much less. She is not sure what her starting weight was 4 weeks ago when restarted. Today weight is 200 lb. Exercise- long walks w/my big dogs. Eating habits- body weight strength training of lower body. Diet- not counting calories or special eating habits. Did Noom prior.   No side effects. Feels like she is tolerating well. No palpitations, no anxiety. Sleep is good.   She thinks the 15mg dose is good for now.       Breast reduction surgery-   Ended up staying overnight vs outpatient. The next day, right nipple tissue was black/necrotic. Required second surgery to debride tissue, needed wound vac. Then needed 3rd surgery. Her surgeon left [facility]. She is seeing someone else on the team for follow up in 10/22/2024. She has a firm area that has been present since postop under and around region of right nipple. It is not expanding but not sure that is has been lessening in size with time. Not painful. She is wondering if that is expected.         Review of Systems  Constitutional, HEENT, cardiovascular, pulmonary, gi and gu systems are negative, except as otherwise noted.      Objective    /72   Pulse 105   Temp 97.2  F (36.2  C) (Temporal)   Resp 14   Ht 1.59 m (5' 2.6\")   Wt 90.9 kg (200 lb 8 oz)   LMP 07/15/2024 (Approximate)   SpO2 100%   BMI 35.97 kg/m    Body mass index is 35.97 kg/m .  Physical Exam   GENERAL: alert and no distress  EYES: Eyes " grossly normal to inspection, PERRL and conjunctivae and sclerae normal  HENT: ear canals and TM's normal, nose and mouth without ulcers or lesions  NECK: no adenopathy, no asymmetry, masses, or scars  RESP: lungs clear to auscultation - no rales, rhonchi or wheezes  BREAST: post-reduction scars. Left breast no masses or tenderness. Right breast- firm tissue w/irregular contour below the right areola, no discharge, or redness, and no palpable axillary masses or adenopathy  CV: regular rate and rhythm, normal S1 S2, no S3 or S4, no murmur, click or rub, no peripheral edema  ABDOMEN: soft, nontender, no hepatosplenomegaly, no masses and bowel sounds normal  MS: no gross musculoskeletal defects noted, no edema  NEURO: Normal strength and tone, mentation intact and speech normal  PSYCH: mentation appears normal, affect normal/bright            Signed Electronically by: Paulina Phipps PA-C     Suturegard Body: The suture ends were repeatedly re-tightened and re-clamped to achieve the desired tissue expansion.

## 2024-09-26 NOTE — NURSING NOTE
Prior to immunization administration, verified patients identity using patient s name and date of birth. Please see Immunization Activity for additional information.     Screening Questionnaire for Adult Immunization    Are you sick today?   No   Do you have allergies to medications, food, a vaccine component or latex?   No   Have you ever had a serious reaction after receiving a vaccination?   No   Do you have a long-term health problem with heart, lung, kidney, or metabolic disease (e.g., diabetes), asthma, a blood disorder, no spleen, complement component deficiency, a cochlear implant, or a spinal fluid leak?  Are you on long-term aspirin therapy?   No   Do you have cancer, leukemia, HIV/AIDS, or any other immune system problem?   No   Do you have a parent, brother, or sister with an immune system problem?   No   In the past 3 months, have you taken medications that affect  your immune system, such as prednisone, other steroids, or anticancer drugs; drugs for the treatment of rheumatoid arthritis, Crohn s disease, or psoriasis; or have you had radiation treatments?   No   Have you had a seizure, or a brain or other nervous system problem?   No   During the past year, have you received a transfusion of blood or blood    products, or been given immune (gamma) globulin or antiviral drug?   No   For women: Are you pregnant or is there a chance you could become       pregnant during the next month?   No   Have you received any vaccinations in the past 4 weeks?   No     Immunization questionnaire answers were all negative.      Patient instructed to remain in clinic for 15 minutes afterwards, and to report any adverse reactions.     Screening performed by Monique Garcia MA on 9/26/2024 at 7:58 AM.

## 2024-10-04 ENCOUNTER — MYC MEDICAL ADVICE (OUTPATIENT)
Dept: PLASTIC SURGERY | Facility: CLINIC | Age: 39
End: 2024-10-04
Payer: COMMERCIAL

## 2024-10-04 NOTE — TELEPHONE ENCOUNTER
Spoke with pt, went over the history of her BRM and R breast wound issues. Denies feeling ill, denies spreading redness, or hot to the touch, no fevers. The breast has been draining again for the last week, feels hard but this is stable from surgery. Pt does not feel she needs to be seen today, apt scheduled for next week to check on this.

## 2024-10-08 ENCOUNTER — OFFICE VISIT (OUTPATIENT)
Dept: PLASTIC SURGERY | Facility: CLINIC | Age: 39
End: 2024-10-08
Payer: COMMERCIAL

## 2024-10-08 VITALS
HEART RATE: 120 BPM | RESPIRATION RATE: 16 BRPM | SYSTOLIC BLOOD PRESSURE: 129 MMHG | WEIGHT: 200 LBS | OXYGEN SATURATION: 100 % | BODY MASS INDEX: 35.88 KG/M2 | TEMPERATURE: 98.4 F | DIASTOLIC BLOOD PRESSURE: 86 MMHG

## 2024-10-08 DIAGNOSIS — N64.59 NIPPLE PROBLEM: ICD-10-CM

## 2024-10-08 DIAGNOSIS — Z98.890 STATUS POST BILATERAL BREAST REDUCTION: Primary | ICD-10-CM

## 2024-10-08 PROCEDURE — 99213 OFFICE O/P EST LOW 20 MIN: CPT | Performed by: PLASTIC SURGERY

## 2024-10-08 ASSESSMENT — PAIN SCALES - GENERAL: PAINLEVEL: MILD PAIN (2)

## 2024-10-08 NOTE — PROGRESS NOTES
PRESENTING COMPLAINT:  Follow up visit for bilateral breast reduction with nipple healing issues more on the right than the left done by my colleague Dr. Johnson back in May 2024.     HISTORY OF PRESENTING COMPLAINT: The patient is here for follow up.  The patient is being seen in the presence of my nurse.     The patient is seeing me as Dr. Johnson has left the institution.  Patient had significant right nipple necrosis requiring debridements and VAC therapy and secondary intention healing along with delayed primary closure.  Last surgery was in July 2024.  Patient has subsequently developed a small suture abscess and is here to have this looked at.  Overall feels the right side is slightly smaller than the left side and slightly misshapen.    On exam: Vital signs stable afebrile.  Left breast is healed.  Right breast is healed but has a small periareolar suture abscess.  No infection.  The right breast is firm and square.     ASSESSMENT AND PLAN:  Based upon the above findings, the patient is here for follow up.     I under sterile conditions remove the suture.  I have advised to let this area heal in secondarily.  I have advised not doing anything surgical for another 9 months to 12 months to allow everything to settle and heal and mature.  At that time revisional surgeries like rounding out the breast, fat grafting and nipple areolar tattooing can be done.  She understood and agreed.  Will see her back in 2 to 4 weeks to monitor her progress.    All questions were answered.  The patient was happy with the visit.    Total time spent in the encounter today including chart review visit itself and postvisit paperwork was 20 minutes.

## 2024-10-08 NOTE — NURSING NOTE
"Oncology Rooming Note    October 8, 2024 10:36 AM   Andres Trivedi is a 39 year old female who presents for:    Chief Complaint   Patient presents with    Oncology Clinic Visit     RTN for Post OP Breast Wound     Initial Vitals: /86 (BP Location: Right arm, Patient Position: Right side, Cuff Size: Adult Large)   Pulse 120   Temp 98.4  F (36.9  C) (Oral)   Resp 16   Wt 90.7 kg (200 lb)   LMP 07/15/2024 (Approximate)   SpO2 100%   BMI 35.88 kg/m   Estimated body mass index is 35.88 kg/m  as calculated from the following:    Height as of 9/26/24: 1.59 m (5' 2.6\").    Weight as of this encounter: 90.7 kg (200 lb). Body surface area is 2 meters squared.  Mild Pain (2) Comment: Data Unavailable   Patient's last menstrual period was 07/15/2024 (approximate).  Allergies reviewed: Yes  Medications reviewed: Yes    Medications: Medication refills not needed today.  Pharmacy name entered into [facility]: [id number] - [place], [place] - [address] [place] [address]    Frailty Screening:   Is the patient here for a new oncology consult visit in cancer care? 2. No      Clinical concerns: none       Hetal Fernandez MA             "

## 2024-10-22 ENCOUNTER — OFFICE VISIT (OUTPATIENT)
Dept: PLASTIC SURGERY | Facility: CLINIC | Age: 39
End: 2024-10-22
Payer: COMMERCIAL

## 2024-10-22 VITALS
SYSTOLIC BLOOD PRESSURE: 120 MMHG | BODY MASS INDEX: 35.88 KG/M2 | TEMPERATURE: 98.1 F | HEART RATE: 113 BPM | HEIGHT: 63 IN | DIASTOLIC BLOOD PRESSURE: 73 MMHG | OXYGEN SATURATION: 98 %

## 2024-10-22 DIAGNOSIS — N64.59 NIPPLE PROBLEM: ICD-10-CM

## 2024-10-22 DIAGNOSIS — Z98.890 STATUS POST BILATERAL BREAST REDUCTION: Primary | ICD-10-CM

## 2024-10-22 PROCEDURE — 99212 OFFICE O/P EST SF 10 MIN: CPT | Performed by: PHYSICIAN ASSISTANT

## 2024-10-22 ASSESSMENT — PAIN SCALES - GENERAL: PAINLEVEL: MILD PAIN (2)

## 2024-10-22 NOTE — NURSING NOTE
"Chief Complaint   Patient presents with    RECHECK     Breast reduction follow-up.       Vitals:    10/22/24 0836   BP: 120/73   BP Location: Right arm   Patient Position: Sitting   Cuff Size: Adult Large   Pulse: 113   Temp: 98.1  F (36.7  C)   TempSrc: Oral   SpO2: 98%   Height: 1.59 m (5' 2.6\")       Body mass index is 35.88 kg/m .    Patient reports mild R breast pain/tenderness (2/10).    Rajinder Siegel, EMT    "

## 2024-10-22 NOTE — LETTER
"10/22/2024       RE: Andres Trivdei  [address] [facility] [id number]     Dear Colleague,    Thank you for referring your patient, Andres Trivedi, to the [facility] [place] at [facility]. Please see a copy of my visit note below.    Plastic Surgery Outpatient Visit    ID: Andres Trivedi is a 39 year old female s/p Complex closure right breast wound - Right 7/5/2024 with Dr. Johnson. Seen 2 weeks ago for suture abscess.     S: she is doing ok, feeling emotional about the long course of healing to R breast. R breast wound has healed, using vaseline daily.     O:  /73 (BP Location: Right arm, Patient Position: Sitting, Cuff Size: Adult Large)   Pulse 113   Temp 98.1  F (36.7  C) (Oral)   Ht 1.59 m (5' 2.6\")   LMP 07/15/2024 (Approximate)   SpO2 98%   BMI 35.88 kg/m     General: NAD, intermittently tearful, answers questions appropriately  Chest: R breast superior edge of areola with epithelialized purple, scar. Palpable buried stitch lateral to previous spitting stitch. L breast healed.     A/P:  -healing since last visit  -discussed removing buried palpable stitch, generally we do not create a new wound to remove these. There is still a chance it dissolves and doesn't spit/cause suture abscess. Discussed removal of stitch at home if it does come through skin.   -consider follow up in 6-9 months if desires revision to R breast. Also discussed sending a photo of any concerns and can be seen anytime in the future if the stitch causes a problem.     Keisha Franks PA-C  Plastic and Reconstructive Surgery    15 minutes spent on the date of the encounter doing chart review, history and physical, dressing changes, documentation and further activity as noted above.      Again, thank you for allowing me to participate in the care of your patient.      Sincerely,    Keisha Franks PA-C    "

## 2024-10-27 ENCOUNTER — MYC MEDICAL ADVICE (OUTPATIENT)
Dept: FAMILY MEDICINE | Facility: CLINIC | Age: 39
End: 2024-10-27
Payer: COMMERCIAL

## 2024-10-31 ENCOUNTER — MYC MEDICAL ADVICE (OUTPATIENT)
Dept: FAMILY MEDICINE | Facility: CLINIC | Age: 39
End: 2024-10-31
Payer: COMMERCIAL

## 2024-10-31 DIAGNOSIS — E66.812 CLASS 2 OBESITY DUE TO EXCESS CALORIES WITHOUT SERIOUS COMORBIDITY WITH BODY MASS INDEX (BMI) OF 35.0 TO 35.9 IN ADULT: ICD-10-CM

## 2024-10-31 DIAGNOSIS — E66.09 CLASS 2 OBESITY DUE TO EXCESS CALORIES WITHOUT SERIOUS COMORBIDITY WITH BODY MASS INDEX (BMI) OF 35.0 TO 35.9 IN ADULT: ICD-10-CM

## 2024-11-01 RX ORDER — PHENTERMINE HYDROCHLORIDE 15 MG/1
15 CAPSULE ORAL EVERY MORNING
Qty: 30 CAPSULE | Refills: 1 | Status: SHIPPED | OUTPATIENT
Start: 2024-11-01

## 2024-11-06 ENCOUNTER — MYC MEDICAL ADVICE (OUTPATIENT)
Dept: FAMILY MEDICINE | Facility: CLINIC | Age: 39
End: 2024-11-06
Payer: COMMERCIAL

## 2024-11-07 NOTE — TELEPHONE ENCOUNTER
Pharmacy change, forgot prior to leaving [place].    New pharmacy on sig and Cub called to place back on shelf.    Bella Hernandez RN  [facility] - Registered Nurse  Clinic Triage [place]   November 7, 2024

## 2025-01-02 NOTE — PROGRESS NOTES
[facility] Dermatology Note  Encounter Date: Apr 14, 2021  Office Visit     Dermatology Problem List:  1. Inflamed skin tag -s/p cryo 4/14/21    Family history: Negative for skin cancer.  Social hx: uses tanning beds  ____________________________________________    Assessment & Plan:  # Cherry angioma(s).    - No further intervention needed.    # Multiple clinically benign nevi on the extremities.    - No further intervention needed.   - ABCD's of melanoma were reviewed with patient and handout provided.   - Sunscreen: Apply 20 minutes prior to going outdoors and reapply every two hours, when wet or sweating. We recommend using an SPF 30 or higher, and to use one that is water resistant.   - Strongly discouraged tanning bed use      # Inflamed skin tag, L neck x1  - See procedure note.    Procedures Performed:   - Cryotherapy procedure note, location(s): right shoulder. After verbal consent and discussion of risks and benefits including, but not limited to, dyspigmentation/scar, blister, and pain, 1 lesion(s) was(were) treated with 1-2 mm freeze border for 1-2 cycles with liquid nitrogen. Post cryotherapy instructions were provided.    Follow-up: prn for new or changing lesions    Staff and Scribe:     Scribe Disclosure:   I, Jing Benson, am serving as a scribe to document services personally performed by Shyanne Worthy PA-C, based on data collection and the provider's statements to me.    Provider Disclosure:   The documentation recorded by the scribe accurately reflects the services I personally performed and the decisions made by me.    All risks, benefits and alternatives were discussed with patient.  Patient is in agreement and understands the assessment and plan.  All questions were answered.    Shyanne Worthy PA-C, MPAS  [facility] [place]: Phone: [phone], Fax: [phone]  [facility]: Phone:  Patient calling in to verify his Trulicity he states he currently has a  half of  box of 1.5mg and a full box of 3mg.   [phone],  Fax: [phone]  ____________________________________________    CC: Derm Problem (Spot check on left neck, left shoulder, right knee. Patient has never seen dermatology previously. No known family history of skin cancer.)    HPI:  Ms. Andres Trivedi is a(n) 36 year old female who presents today as a new patient for spot check.    Self-referred.     Today, notes concerns on the left neck, left shoulder, and right knee. The lesion on right medial leg has appeared in the last 2 years or so. Patient has been tanning.    Patient is otherwise feeling well, without additional concerns.    Labs:  NA    Physical Exam:  Vitals: There were no vitals taken for this visit.  SKIN: Focused examination of upper and lower extremities and back was performed.  - 7mm flesh colored papule with some brown pigment centrally on the left shoulder.  - On the right medial knee there is a 4 mm pinkish brown papule  - There are dome shaped bright red papules on the lower extremity.   - Multiple regular brown pigmented macules and papules are identified on the trunk and extremities.    - There is(are) skin colored pedunculated papules on the left side of the neck.   - Brothers's skin type I-II  - No other lesions of concern on areas examined.     Medications:  No current outpatient medications on file.     No current facility-administered medications for this visit.       Past Medical History:   Patient Active Problem List   Diagnosis     BMI 35.0-35.9,adult     Past Medical History:   Diagnosis Date     Depression        CC Dr. Pendleton on close of this encounter.

## 2025-02-02 ENCOUNTER — HEALTH MAINTENANCE LETTER (OUTPATIENT)
Age: 40
End: 2025-02-02

## 2025-02-10 ENCOUNTER — MYC REFILL (OUTPATIENT)
Dept: FAMILY MEDICINE | Facility: CLINIC | Age: 40
End: 2025-02-10
Payer: COMMERCIAL

## 2025-02-10 DIAGNOSIS — E66.09 CLASS 2 OBESITY DUE TO EXCESS CALORIES WITHOUT SERIOUS COMORBIDITY WITH BODY MASS INDEX (BMI) OF 35.0 TO 35.9 IN ADULT: ICD-10-CM

## 2025-02-10 DIAGNOSIS — E66.812 CLASS 2 OBESITY DUE TO EXCESS CALORIES WITHOUT SERIOUS COMORBIDITY WITH BODY MASS INDEX (BMI) OF 35.0 TO 35.9 IN ADULT: ICD-10-CM

## 2025-02-10 RX ORDER — PHENTERMINE HYDROCHLORIDE 15 MG/1
15 CAPSULE ORAL EVERY MORNING
Qty: 30 CAPSULE | Refills: 0 | OUTPATIENT
Start: 2025-02-10

## 2025-03-10 SDOH — HEALTH STABILITY: PHYSICAL HEALTH: ON AVERAGE, HOW MANY DAYS PER WEEK DO YOU ENGAGE IN MODERATE TO STRENUOUS EXERCISE (LIKE A BRISK WALK)?: 2 DAYS

## 2025-03-10 SDOH — HEALTH STABILITY: PHYSICAL HEALTH: ON AVERAGE, HOW MANY MINUTES DO YOU ENGAGE IN EXERCISE AT THIS LEVEL?: 30 MIN

## 2025-03-10 ASSESSMENT — SOCIAL DETERMINANTS OF HEALTH (SDOH): HOW OFTEN DO YOU GET TOGETHER WITH FRIENDS OR RELATIVES?: TWICE A WEEK

## 2025-03-13 ENCOUNTER — OFFICE VISIT (OUTPATIENT)
Dept: FAMILY MEDICINE | Facility: CLINIC | Age: 40
End: 2025-03-13
Attending: PHYSICIAN ASSISTANT
Payer: COMMERCIAL

## 2025-03-13 VITALS
DIASTOLIC BLOOD PRESSURE: 82 MMHG | WEIGHT: 206.5 LBS | BODY MASS INDEX: 38 KG/M2 | HEART RATE: 103 BPM | RESPIRATION RATE: 12 BRPM | SYSTOLIC BLOOD PRESSURE: 132 MMHG | HEIGHT: 62 IN | OXYGEN SATURATION: 100 % | TEMPERATURE: 97 F

## 2025-03-13 DIAGNOSIS — Z00.00 ROUTINE GENERAL MEDICAL EXAMINATION AT A HEALTH CARE FACILITY: Primary | ICD-10-CM

## 2025-03-13 DIAGNOSIS — H57.02 ANISOCORIA: ICD-10-CM

## 2025-03-13 DIAGNOSIS — E66.09 CLASS 2 OBESITY DUE TO EXCESS CALORIES WITHOUT SERIOUS COMORBIDITY WITH BODY MASS INDEX (BMI) OF 35.0 TO 35.9 IN ADULT: ICD-10-CM

## 2025-03-13 DIAGNOSIS — E66.812 CLASS 2 OBESITY DUE TO EXCESS CALORIES WITHOUT SERIOUS COMORBIDITY WITH BODY MASS INDEX (BMI) OF 35.0 TO 35.9 IN ADULT: ICD-10-CM

## 2025-03-13 DIAGNOSIS — R51.9 ACUTE NONINTRACTABLE HEADACHE, UNSPECIFIED HEADACHE TYPE: ICD-10-CM

## 2025-03-13 LAB
CHOLEST SERPL-MCNC: 154 MG/DL
FASTING STATUS PATIENT QL REPORTED: YES
FASTING STATUS PATIENT QL REPORTED: YES
GLUCOSE SERPL-MCNC: 95 MG/DL (ref 70–99)
HDLC SERPL-MCNC: 64 MG/DL
LDLC SERPL CALC-MCNC: 81 MG/DL
NONHDLC SERPL-MCNC: 90 MG/DL
TRIGL SERPL-MCNC: 44 MG/DL
TSH SERPL DL<=0.005 MIU/L-ACNC: 0.9 UIU/ML (ref 0.3–4.2)

## 2025-03-13 RX ORDER — PHENTERMINE HYDROCHLORIDE 15 MG/1
15 CAPSULE ORAL EVERY MORNING
Qty: 30 CAPSULE | Refills: 0 | Status: CANCELLED | OUTPATIENT
Start: 2025-03-13

## 2025-03-13 ASSESSMENT — PAIN SCALES - GENERAL: PAINLEVEL_OUTOF10: NO PAIN (0)

## 2025-03-13 NOTE — PATIENT INSTRUCTIONS
Patient Education   Preventive Care Advice   This is general advice given by our system to help you stay healthy. However, your care team may have specific advice just for you. Please talk to your care team about your preventive care needs.  Nutrition  Eat 5 or more servings of fruits and vegetables each day.  Try wheat bread, brown rice and whole grain pasta (instead of white bread, rice, and pasta).  Get enough calcium and vitamin D. Check the label on foods and aim for 100% of the RDA (recommended daily allowance).  Lifestyle  Exercise at least 150 minutes each week  (30 minutes a day, 5 days a week).  Do muscle strengthening activities 2 days a week. These help control your weight and prevent disease.  No smoking.  Wear sunscreen to prevent skin cancer.  Have a dental exam and cleaning every 6 months.  Yearly exams  See your health care team every year to talk about:  Any changes in your health.  Any medicines your care team has prescribed.  Preventive care, family planning, and ways to prevent chronic diseases.  Shots (vaccines)   HPV shots (up to age 26), if you've never had them before.  Hepatitis B shots (up to age 59), if you've never had them before.  COVID-19 shot: Get this shot when it's due.  Flu shot: Get a flu shot every year.  Tetanus shot: Get a tetanus shot every 10 years.  Pneumococcal, hepatitis A, and RSV shots: Ask your care team if you need these based on your risk.  Shingles shot (for age 50 and up)  General health tests  Diabetes screening:  Starting at age 35, Get screened for diabetes at least every 3 years.  If you are younger than age 35, ask your care team if you should be screened for diabetes.  Cholesterol test: At age 39, start having a cholesterol test every 5 years, or more often if advised.  Bone density scan (DEXA): At age 50, ask your care team if you should have this scan for osteoporosis (brittle bones).  Hepatitis C: Get tested at least once in your life.  STIs (sexually  transmitted infections)  Before age 24: Ask your care team if you should be screened for STIs.  After age 24: Get screened for STIs if you're at risk. You are at risk for STIs (including HIV) if:  You are sexually active with more than one person.  You don't use condoms every time.  You or a partner was diagnosed with a sexually transmitted infection.  If you are at risk for HIV, ask about PrEP medicine to prevent HIV.  Get tested for HIV at least once in your life, whether you are at risk for HIV or not.  Cancer screening tests  Cervical cancer screening: If you have a cervix, begin getting regular cervical cancer screening tests starting at age 21.  Breast cancer scan (mammogram): If you've ever had breasts, begin having regular mammograms starting at age 40. This is a scan to check for breast cancer.  Colon cancer screening: It is important to start screening for colon cancer at age 45.  Have a colonoscopy test every 10 years (or more often if you're at risk) Or, ask your provider about stool tests like a FIT test every year or Cologuard test every 3 years.  To learn more about your testing options, visit:   .  For help making a decision, visit:   https://bit.ly/ap16488.  Prostate cancer screening test: If you have a prostate, ask your care team if a prostate cancer screening test (PSA) at age 55 is right for you.  Lung cancer screening: If you are a current or former smoker ages 50 to 80, ask your care team if ongoing lung cancer screenings are right for you.  For informational purposes only. Not to replace the advice of your health care provider. Copyright   2023 [employer]. All rights reserved. Clinically reviewed by the [facility] Transitions Program. [employer] [id number] - REV 01/24.

## 2025-03-13 NOTE — PROGRESS NOTES
Preventive Care Visit  [facility] OZIEL Phipps PA-C, Family Medicine  Mar 13, 2025      Assessment & Plan     Routine general medical examination at a health care facility  Reviewed VS, weight/BMI   Routine screenings see orders  Immunizations: see orders and documentation in HPI. Discussed vaccines coverage as pharmacy benefit.  Health and cancer screening recommendations delivered according to the USPSTF and other appropriate society guidelines  Nutrition and exercise counseling performed.    - REVIEW OF HEALTH MAINTENANCE PROTOCOL ORDERS  - TSH with free T4 reflex; Future  - Lipid panel reflex to direct LDL Fasting; Future  - Glucose; Future  - TSH with free T4 reflex  - Lipid panel reflex to direct LDL Fasting  - Glucose    Class 2 obesity due to excess calories without serious comorbidity with body mass index (BMI) of 35.0 to 35.9 in adult  Wants to discontinue the phentermine and would prefer not to try any new meds while she is having the headaches and anisocoria.   She will cont with diet and exercise management   She does not think her insurance covers GLP1 medications    Anisocoria  Acute nonintractable headache, unspecified headache type  Reviewed ER notes and ophthalmology notes.  Ref to neurology. She has referral to [facility] of neuro also if they can accommodate sooner.   Reviewed warning s/sx for which to be seen back in the ER- misha syndrome features and other focal neuro sx.   - Adult Neurology  Referral; Future    Patient has been advised of split billing requirements and indicates understanding: Yes      MED REC REQUIRED  Post Medication Reconciliation Status:     Counseling  Appropriate preventive services were addressed with this patient via screening, questionnaire, or discussion as appropriate for fall prevention, nutrition, physical activity, Tobacco-use cessation, social engagement, weight loss and cognition.  Checklist reviewing preventive  services available has been given to the patient.  Reviewed patient's diet, addressing concerns and/or questions.   She is at risk for lack of exercise and has been provided with information to increase physical activity for the benefit of her well-being.   The patient was instructed to see the dentist every 6 months.       Follow Up: see above. Additionally patient was instructed to contact clinic for worsening symptoms, non-improvement in time frame discussed, and for questions regarding treatment plan.   For virtual visits, the patient was advised to be seen for in person evaluation if symptoms or condition are worsening or non-improvement as expected.   CRISTIAN Humphrey   Andres is a 39 year old, presenting for the following:  Physical, Recheck Medication, and ER F/U        3/13/2025     7:46 AM   Additional Questions   Roomed by Kasia RODRIGUEZ   Accompanied by Marcel         3/13/2025     7:46 AM   Patient Reported Additional Medications   Patient reports taking the following new medications NA        HPI  Routine Health Maintenance:  Immunizations UTD   Fasting: yes    GYN Hx:  Pap: Last: 12/2020- repeat in 5 yr.  Wants to put off to later in the year  Contraception: Mirena IUD placed 08/16/2022. Bleeding  - 1x per month usually light   Sexually active/STD screening concerns: not currently, no concerns   Denies GYN concerns of PCB, pelvic pain, dyspareunia, vaginal discharge    ED/UC Followup:  Facility:  [facility]   Date of visit: 3/10/25  Reason for visit: Headache and unequal pupils   Current Status: still having symptoms     Headache and Anisocoria-    Has had more intermittent headaches in last week and noticed her left pupil was bigger and vision in left eye was blurry. Saw the eye doctor 2 days later who noted the difference in pupil size but otherwise normal exam. He advised an MRI. She was seen in the ER at [facility]. They did an MRI which was normal. Was advised neurology  referral and follow up with ophthalmology. No recent head injury.  No drooping of eye lids, no facial weakness with opening/closing eye. No focal neuro sx of weakness, N/T, etc.       Medication Followup of Phentermine   Taking Medication as prescribed: NO-stopped 1 week ago   Side Effects:  possible headaches and unequal pupil dilation   Medication Helping Symptoms:  yes  Has stopped it due to headaches.   Thinks her insurance will not cover GLP1 meds. Wants to not add in anything new with above sx.   Wants to lifestyle manage.         Advance Care Planning  Patient has a Health Care Directive on file  Advance care planning document is on file and is current.      3/10/2025   General Health   How would you rate your overall physical health? (!) FAIR   Feel stress (tense, anxious, or unable to sleep) Only a little   (!) STRESS CONCERN      3/10/2025   Nutrition   Three or more servings of calcium each day? Yes   Diet: Regular (no restrictions)   How many servings of fruit and vegetables per day? (!) 2-3   How many sweetened beverages each day? 0-1         3/10/2025   Exercise   Days per week of moderate/strenous exercise 2 days   Average minutes spent exercising at this level 30 min   (!) EXERCISE CONCERN      3/10/2025   Social Factors   Frequency of gathering with friends or relatives Twice a week   Worry food won't last until get money to buy more No   Food not last or not have enough money for food? No   Do you have housing? (Housing is defined as stable permanent housing and does not include staying ouside in a car, in a tent, in an abandoned building, in an overnight shelter, or couch-surfing.) Yes   Are you worried about losing your housing? No   Lack of transportation? No   Unable to get utilities (heat,electricity)? No         3/10/2025   Dental   Dentist two times every year? (!) NO           Today's PHQ-2 Score:       3/12/2025     9:47 AM   PHQ-2 ( 1999 [employer])   Q1: Little interest or pleasure in doing  things 0   Q2: Feeling down, depressed or hopeless 0   PHQ-2 Score 0    Q1: Little interest or pleasure in doing things Not at all   Q2: Feeling down, depressed or hopeless Not at all   PHQ-2 Score 0       Patient-reported           3/10/2025   Substance Use   Alcohol more than 3/day or more than 7/wk No   Do you use any other substances recreationally? No     Social History     Tobacco Use    Smoking status: Never     Passive exposure: Never    Smokeless tobacco: Never    Tobacco comments:     no smokers in the household   Vaping Use    Vaping status: Never Used   Substance Use Topics    Alcohol use: Yes     Alcohol/week: 1.0 standard drink of alcohol     Types: 1 Standard drinks or equivalent per week     Comment: occ.- social. some weeks none. 1-2 socially.    Drug use: Never           12/27/2023   LAST FHS-7 RESULTS   1st degree relative breast or ovarian cancer No   Any relative bilateral breast cancer Unknown   Any male have breast cancer No   Any ONE woman have BOTH breast AND ovarian cancer Yes   Any woman with breast cancer before 50yrs Unknown   2 or more relatives with breast AND/OR ovarian cancer No   2 or more relatives with breast AND/OR bowel cancer No        Mammogram Screening - Patient under 40 years of age: Routine Mammogram Screening not recommended.         3/10/2025   STI Screening   New sexual partner(s) since last STI/HIV test? No     History of abnormal Pap smear: No - age 30- 64 PAP with HPV every 5 years recommended        Latest Ref Rng & Units 12/18/2020    11:40 AM 12/18/2020    11:22 AM   PAP / HPV   PAP (Historical)  NIL     HPV 16 DNA NEG^Negative  Negative    HPV 18 DNA NEG^Negative  Negative    Other HR HPV NEG^Negative  Negative            3/10/2025   Contraception/Family Planning   Questions about contraception or family planning No        Reviewed and updated as needed this visit by Provider                    Past Medical History:   Diagnosis Date    Chronic bilateral thoracic  back pain     Depressive disorder 2019    Was on Lexapro for 2 years. No current medications.    Macromastia     Neck pain     Obesity     Transfusion of blood product declined due to Confucianism reason      Past Surgical History:   Procedure Laterality Date    APPLY WOUND VAC Bilateral 5/28/2024    Procedure: APPLICATION OF WOUND VACUUM RIGHT NIPPLE AREOLA COMPLEX, SIMPLE  CLOSURE 11.5 CM OF LEFT AREOLA COMPLEX;  Surgeon: Avinash Johnson MD;  Location: [facility] OR    IRRIGATION AND DEBRIDEMENT BREAST Right 5/28/2024    Procedure: Irrigation and sharp debridement of necrotic right breast nipple areolar complex;  Surgeon: Avinash Johnson MD;  Location: [facility] OR    IRRIGATION AND DEBRIDEMENT BREAST Right 7/5/2024    Procedure: Complex closure right breast wound;  Surgeon: Avinash Johnson MD;  Location: [facility] OR    MAMMOPLASTY REDUCTION Bilateral 05/13/2024    Procedure: MAMMOPLASTY, REDUCTION BILATERAL;  Surgeon: Avinash Johnson MD;  Location: [facility] OR     Lab work is in process  Labs reviewed in EPIC  BP Readings from Last 3 Encounters:   03/13/25 132/82   10/22/24 120/73   10/08/24 129/86    Wt Readings from Last 3 Encounters:   03/13/25 93.7 kg (206 lb 8 oz)   10/08/24 90.7 kg (200 lb)   09/26/24 90.9 kg (200 lb 8 oz)                  Patient Active Problem List   Diagnosis    BMI 35.0-35.9,adult    IUD (intrauterine device) in place    Neck pain    Chronic bilateral thoracic back pain    Class 2 obesity due to excess calories with body mass index (BMI) of 38.0 to 38.9 in adult, unspecified whether serious comorbidity present    Transfusion of blood product declined due to Confucianism reason    S/P bilateral breast reduction    Nipple problem     Past Surgical History:   Procedure Laterality Date    APPLY WOUND VAC Bilateral 5/28/2024    Procedure: APPLICATION OF WOUND VACUUM RIGHT NIPPLE AREOLA COMPLEX, SIMPLE  CLOSURE 11.5 CM OF LEFT AREOLA COMPLEX;  Surgeon: Avinash Johnson MD;   Location: [facility] OR    IRRIGATION AND DEBRIDEMENT BREAST Right 2024    Procedure: Irrigation and sharp debridement of necrotic right breast nipple areolar complex;  Surgeon: Avinash Johnson MD;  Location: [facility] OR    IRRIGATION AND DEBRIDEMENT BREAST Right 2024    Procedure: Complex closure right breast wound;  Surgeon: Avinash Johnson MD;  Location: [facility] OR    MAMMOPLASTY REDUCTION Bilateral 2024    Procedure: MAMMOPLASTY, REDUCTION BILATERAL;  Surgeon: Avinash Johnson MD;  Location: [facility] OR       Social History     Tobacco Use    Smoking status: Never     Passive exposure: Never    Smokeless tobacco: Never    Tobacco comments:     no smokers in the household   Substance Use Topics    Alcohol use: Yes     Alcohol/week: 1.0 standard drink of alcohol     Types: 1 Standard drinks or equivalent per week     Comment: occ.- social. some weeks none. 1-2 socially.     Family History   Problem Relation Age of Onset    Food Allergy Mother         shrimp    Hyperlipidemia Father     Hypertension Father     Diabetes Father         Pre-Diabetic    Other Cancer Father         BCC    No Known Problems Sister     Autoimmune Disease Sister     Food Allergy Maternal Grandmother         shrimp    Dementia Maternal Grandfather     Diabetes Paternal Grandmother             Cardiovascular Paternal Grandmother     Diabetes Paternal Grandfather             Cardiovascular Paternal Grandfather          Current Outpatient Medications   Medication Sig Dispense Refill    levonorgestrel (MIRENA) 20 MCG/DAY IUD 1 each (20 mcg) by Intrauterine route once      vitamin C (ASCORBIC ACID) 1000 MG TABS Take 1,000 mg by mouth daily      Zinc Sulfate (ZINC 15 PO)        No Known Allergies      Review of Systems  Constitutional, HEENT, cardiovascular, pulmonary, gi and gu systems are negative, except as otherwise noted.     Objective    Exam  /82   Pulse 103   Temp 97  F (36.1  C)  "(Temporal)   Resp 12   Ht 1.58 m (5' 2.21\")   Wt 93.7 kg (206 lb 8 oz)   SpO2 100%   BMI 37.52 kg/m     Estimated body mass index is 37.52 kg/m  as calculated from the following:    Height as of this encounter: 1.58 m (5' 2.21\").    Weight as of this encounter: 93.7 kg (206 lb 8 oz).    Physical Exam  GENERAL: alert and no distress  EYES: Eyes grossly normal to inspection, right pupil slightly more sluggish, left pupil subtly larger than right; and conjunctivae and sclerae normal. Lids normal.   HENT: ear canals and TM's normal, nose and mouth without ulcers or lesions  NECK: no adenopathy, no asymmetry, masses, or scars  RESP: lungs clear to auscultation - no rales, rhonchi or wheezes  BREAST: s/p reduction, scars noted and healing tissue, otherwise normal without masses, tenderness or nipple discharge and no palpable axillary masses or adenopathy  CV: regular rate and rhythm, normal S1 S2, no S3 or S4, no murmur, click or rub, no peripheral edema  ABDOMEN: soft, nontender, no hepatosplenomegaly, no masses and bowel sounds normal  MS: no gross musculoskeletal defects noted, no edema  SKIN: no suspicious lesions or rashes  NEURO: Normal strength and tone, mentation intact and speech normal  PSYCH: mentation appears normal, affect normal/bright        Signed Electronically by: Paulina Phipps PA-C    "

## 2025-03-17 NOTE — TELEPHONE ENCOUNTER
Records Requested   March 17, 2025 9:19 AM   [id number]   Facility  [facility]    Outcome 9:21 am Sent request for imaging to be pushed to PACS. -     RECORDS RECEIVED FROM: Care Everywhere   REASON FOR VISIT: Anisocoria, Acute nonintractable headache, unspecified headache type   PROVIDER: Yvonne Rosenberg APRN CNP   DATE OF APPT: 5/23/25 @ 7:00 am    NOTES (FOR ALL VISITS) STATUS DETAILS   OFFICE NOTE from referring provider Internal 3/13/25 Paulina Phipps PA-C @[facility]     OFFICE NOTE from other specialist Internal 3/7/25 Ankit Lynn OD @[facility]     DISCHARGE REPORT from the ER Care Everywhere 3/10/25 Vahid Sahu MD  @[facility]     MEDICATION LIST Internal    IMAGING  (FOR ALL VISITS)     MRI (HEAD, NECK, SPINE) [place] [facility]  3/10/25 MR Facial/Orbits  3/10/25 [facility] Brain

## 2025-03-24 ENCOUNTER — TELEPHONE (OUTPATIENT)
Dept: NEUROLOGY | Facility: CLINIC | Age: 40
End: 2025-03-24
Payer: COMMERCIAL

## 2025-03-24 NOTE — TELEPHONE ENCOUNTER
[facility] Call Center    Phone Message    May a detailed message be left on voicemail: yes     Reason for Call: Appointment Intake    Referring Provider Name:   Paulina Phipps PA-C     Diagnosis and/or Symptoms:   Anisocoria  Acute nonintractable headache, unspecified headache type    Writer requesting review of referral.  Should patient be scheduled with General or is Headache provider appropriate?    Please advise    Action Taken: Other: Neurology    Travel Screening: Not Applicable

## 2025-03-24 NOTE — TELEPHONE ENCOUNTER
Headache is appropriate. She is scheduled for soonest available in May with Yvonne Rosenberg. Nothing further needed.

## 2025-03-26 ENCOUNTER — TELEPHONE (OUTPATIENT)
Dept: OPHTHALMOLOGY | Facility: CLINIC | Age: 40
End: 2025-03-26
Payer: COMMERCIAL

## 2025-03-26 NOTE — TELEPHONE ENCOUNTER
Spoke with patient regarding scheduling for an earlier appointment on 3/28/25. Patient rescheduled as offered and will [facility]Per Patient

## 2025-05-13 ENCOUNTER — MYC MEDICAL ADVICE (OUTPATIENT)
Dept: FAMILY MEDICINE | Facility: CLINIC | Age: 40
End: 2025-05-13
Payer: COMMERCIAL

## 2025-05-13 DIAGNOSIS — J30.1 SEASONAL ALLERGIC RHINITIS DUE TO POLLEN: Primary | ICD-10-CM

## 2025-05-13 RX ORDER — LORATADINE 10 MG/1
10 TABLET ORAL DAILY
Qty: 90 TABLET | Refills: 3 | Status: SHIPPED | OUTPATIENT
Start: 2025-05-13

## 2025-05-23 ENCOUNTER — PRE VISIT (OUTPATIENT)
Dept: NEUROLOGY | Facility: CLINIC | Age: 40
End: 2025-05-23

## 2025-06-19 ENCOUNTER — VIRTUAL VISIT (OUTPATIENT)
Dept: FAMILY MEDICINE | Facility: CLINIC | Age: 40
End: 2025-06-19
Payer: COMMERCIAL

## 2025-06-19 DIAGNOSIS — E66.812 CLASS 2 OBESITY DUE TO EXCESS CALORIES WITHOUT SERIOUS COMORBIDITY WITH BODY MASS INDEX (BMI) OF 35.0 TO 35.9 IN ADULT: Primary | ICD-10-CM

## 2025-06-19 DIAGNOSIS — E66.09 CLASS 2 OBESITY DUE TO EXCESS CALORIES WITHOUT SERIOUS COMORBIDITY WITH BODY MASS INDEX (BMI) OF 35.0 TO 35.9 IN ADULT: Primary | ICD-10-CM

## 2025-06-19 NOTE — PROGRESS NOTES
"Andres is a 40 year old who is being evaluated via a billable video visit.    How would you like to obtain your AVS? MyChart  If the video visit is dropped, the invitation should be resent by: Text to cell phone: [phone]  Will anyone else be joining your video visit? No      Assessment & Plan     Class 2 obesity due to excess calories without serious comorbidity with body mass index (BMI) of 35.0 to 35.9 in adult  Avoiding phentermine since the headaches and anisocoria.  Dicsussed other options- GLP1 would be best. Will see if covered with her insurance plan. Rx sent for zepbound. Did discuss option of sublingual semaglutide from [facility] pharmacy for cash pay.  She will consider other oral med options. No CI to any of remaining options.  Get back to regular exercise and healthy eating.  - tirzepatide-Weight Management (ZEPBOUND) 2.5 MG/0.5ML prefilled pen; Inject 0.5 mLs (2.5 mg) subcutaneously every 7 days.          BMI  Estimated body mass index is 37.52 kg/m  as calculated from the following:    Height as of 3/13/25: 1.58 m (5' 2.21\").    Weight as of 3/13/25: 93.7 kg (206 lb 8 oz).   Weight management plan: Discussed healthy diet and exercise guidelines      Follow Up: see above. Additionally patient was instructed to contact clinic for worsening symptoms, non-improvement in time frame discussed, and for questions regarding treatment plan.   For virtual visits, the patient was advised to be seen for in person evaluation if symptoms or condition are worsening or non-improvement as expected.   Paulina Phipps PA-C    Subjective   Andres is a 40 year old, presenting for the following health issues:  Weight Management        6/19/2025     7:02 AM   Additional Questions   Roomed by Kasia RODRIGUEZ   Accompanied by None         6/19/2025     7:02 AM   Patient Reported Additional Medications   Patient reports taking the following new medications NA     History of Present Illness       Reason for visit:  Discuss " phentermine / weighloss meds    She eats 2-3 servings of fruits and vegetables daily.She consumes 0 sweetened beverage(s) daily.She exercises with enough effort to increase her heart rate 9 or less minutes per day.  She exercises with enough effort to increase her heart rate 3 or less days per week.   She is taking medications regularly.        Obesity management-      Met with neurologist for the headache and anisocoria 05/23/2025.   Since then the headaches have gotten a lot less. The anisocoria is less noticeable. Told as long as things are improving to monitor.  Has not been taking the phentermine.    Since stopped phentermine regained the weight she lost-  not happy with it. Can feel it in clothing.   Current weight- 217 lb  On phentermine to down to 197lb    Eating- not changed much.   Exercise- not as much lately. I need to get back into it.     She does not think her insurance plan covers GLP1 medications.      No fam or personal hx of thyroid cancer. No personal hx of pancreatitis.   No hx of kidney stones  No hx of seizures.     Comorbid- back pain, neck pain. No GERD.       Review of Systems  Constitutional, HEENT, cardiovascular, pulmonary, gi and gu systems are negative, except as otherwise noted.      Objective           Vitals:  No vitals were obtained today due to virtual visit.    Physical Exam   GENERAL: alert and no distress  EYES: Eyes grossly normal to inspection.  No discharge or erythema, or obvious scleral/conjunctival abnormalities.  HENT: Normal cephalic/atraumatic.  External ears, nose and mouth without ulcers or lesions.  No nasal drainage visible.  RESP: No audible wheeze, cough, or visible cyanosis.    SKIN: Visible skin clear. No significant rash, abnormal pigmentation or lesions.  NEURO: Cranial nerves grossly intact.  Mentation and speech appropriate for age.  PSYCH: Appropriate affect, tone, and pace of words          Video-Visit Details    Type of service:  Video Visit    Originating Location (pt. Location): Home    Distant Location (provider location):  On-site  Platform used for Video Visit: Laney  Signed Electronically by: Paulina Phipps PA-C

## 2025-06-19 NOTE — PATIENT INSTRUCTIONS
Instructions from Today's Visit:  Will see if the insurance will cover the zepbound.     General Instructions After Your Visit  If you have been seen for a concern and are worsening or not improving as expected, please schedule a follow-up visit or reach out to a member of our care team or nurses if it is urgent.  We have Nurse advice available 24/7 by calling [phone].  For emergencies, please call [phone].    My Clinic Hours  I am in the office Mondays, Wednesdays, and Thursdays. Messages received outside of normal clinic hours and on my days out of the office will be reviewed by our [place] care team, but may not be addressed by me personally until I am back in the office. If you have concerns that cannot wait for my return please call the Nurse advise line [phone].    Test results  You may see your lab or test results before we can make recommendations. This is common, as sometimes we are awaiting on other labs to return or we are out of office on a particular day. Please be patient, and if you don't see a response from me or one of my colleagues within 2-3 business days, and you have a specific concern, please send us a message.    Refills  If you have run out of refills, please schedule a visit. This is generally an indication that you are due for a follow-up visit. All prescriptions are only valid for 1 year from the date written and need a visit annually to renew. Most mental health and chronic diseases we are treating (diabetes, high blood pressure, etc) require some type of visit every 6 months. We are now offering video visits! However, if physical exams are needed or it is a complex concern, we may ask you to be seen in person.    Physicals & Preventative Visits   These appointment slots fill up fast. Please consider scheduling these 2-3 months in advance to allow for the appointment time that fits you best. If you have medications ordered or other issues addressed that are not preventive at  these visits, please be aware there are extra costs associated with this.

## (undated) DEVICE — DRSG GAUZE 4X4" 3033

## (undated) DEVICE — SYR 10ML FINGER CONTROL W/O NDL 309695

## (undated) DEVICE — HOLDER DRAINAGE BULB 0814-8220

## (undated) DEVICE — DRAPE SHEET REV FOLD 3/4 9349

## (undated) DEVICE — DRESSING XEROFORM PETROLATUM 5X9 33605

## (undated) DEVICE — DRSG GAUZE 4X4" TRAY 6939

## (undated) DEVICE — SU SILK 2-0 FS-1 18" 685G

## (undated) DEVICE — DRAPE CHEST 100X72X124 89227

## (undated) DEVICE — SU STRATAFIX PDS PLUS 2-0 SPIRAL VIOLET SPXX1B415

## (undated) DEVICE — CONTAINER URINE SPEC 4OZ STRL 1053

## (undated) DEVICE — Device

## (undated) DEVICE — DRSG KERLIX 4 1/2"X4YDS ROLL 6715

## (undated) DEVICE — SOL NACL 0.9% IRRIG 1000ML BOTTLE 2F7124

## (undated) DEVICE — SU STRATAFIX PDS PLUS 2-0 SPIRAL SH 23CM SXPP1B433

## (undated) DEVICE — SPONGE LAP 18X18" X8435

## (undated) DEVICE — SU MONOCRYL 3-0 SH 27" UND Y416H

## (undated) DEVICE — SU PROLENE 4-0 PS-2 18" 8682G

## (undated) DEVICE — DRAPE IOBAN INCISE 23X17" 6650EZ

## (undated) DEVICE — GLOVE PI ULTRATCH M LF SZ 6.5 PF CUFF TEXT STRL LF 42665

## (undated) DEVICE — STPL SKIN 35W 6.9MM  PXW35

## (undated) DEVICE — ESU GROUND PAD ADULT REM W/15' CORD E7507DB

## (undated) DEVICE — SU MONOCRYL 3-0 PS-2 18" UND Y497G

## (undated) DEVICE — DRSG ABD TNDRSRB WET PRUF 8IN X 10IN STRL  9194A

## (undated) DEVICE — BLADE KNIFE SURG 15 371115

## (undated) DEVICE — DRSG DRAIN 4X4" 7086

## (undated) DEVICE — SYR BULB IRRIG DOVER 60 ML LATEX FREE 67000

## (undated) DEVICE — ADH LIQUID MASTISOL TOPICAL VIAL 2-3ML 0523-48

## (undated) DEVICE — PREP POVIDONE-IODINE 10% SOLUTION 4OZ BOTTLE MDS093944

## (undated) DEVICE — PAD CHUX UNDERPAD 30X36" P3036C

## (undated) DEVICE — MARKER SURG SKIN 2 TIP STRL SPP99DT2AA

## (undated) DEVICE — CLEANER CAUTERY TIP E2401

## (undated) DEVICE — GOWN LG DISP 9515

## (undated) DEVICE — NDL 25GA 1.5" 305127

## (undated) DEVICE — CUSTOM PACK GEN MAJOR SBA5BGMHEA

## (undated) DEVICE — DRSG STERI STRIP 1/2X4" R1547

## (undated) DEVICE — SOL WATER IRRIG 1000ML BOTTLE 2F7114

## (undated) DEVICE — ESU PENCIL SMOKE EVAC W/ROCKER SWITCH 0703-047-000

## (undated) DEVICE — SUCTION MANIFOLD NEPTUNE 2 SYS 1 PORT 702-025-000

## (undated) DEVICE — DRSG XEROFORM 1X8"

## (undated) DEVICE — PREP CHLORAPREP 26ML TINTED HI-LITE ORANGE 930815

## (undated) DEVICE — SU PDS II 2-0 CT 36"  Z357H

## (undated) DEVICE — DRAIN RESERVOIR 100ML JP 0070740

## (undated) DEVICE — SU MONOCRYL+ 4-0 18IN PS2 UND MCP496G

## (undated) DEVICE — DRSG TELFA 3X8" 1238

## (undated) DEVICE — SUCTION TIP YANKAUER W/O VENT K86

## (undated) DEVICE — PREP POVIDONE-IODINE 7.5% SCRUB 4OZ BOTTLE MDS093945

## (undated) DEVICE — DRAIN RND 1/8 10FR SIL 0070210

## (undated) DEVICE — GLOVE GAMMEX NEOPRENE ULTRA SZ 7.5 LF 8515

## (undated) DEVICE — PEN MARKING SKIN W/LABELS 31145918

## (undated) DEVICE — BLADE KNIFE SURG 10 371110

## (undated) DEVICE — DRAIN BLAKE 15FR SIL 2229

## (undated) DEVICE — GLOVE BIOGEL PI INDICATOR 8.0 LF 41680

## (undated) DEVICE — GLOVE UNDER INDICATOR PI SZ 7.0 LF 41670

## (undated) DEVICE — BNDG ELASTIC 6"X5YDS UNSTERILE 6611-60

## (undated) DEVICE — SU PROLENE 3-0 SH-1 30" 8762H

## (undated) DEVICE — SU SILK 3-0 FS-1 18" 684G

## (undated) DEVICE — NEEDLE HYPO 25X1 SAFETY 305916

## (undated) DEVICE — CATH TRAY FOLEY SURESTEP 16FR DRAIN BAG STATOCK A899916

## (undated) DEVICE — GOWN XLG DISP 9545

## (undated) DEVICE — SU PROLENE 2-0 SH 30" 8833H

## (undated) DEVICE — TUBING INFUSION INFILTRATION LIPOSUCTION 156" 24-6008

## (undated) DEVICE — SU STRATAFIX MONOCRYL 4-0 SPIRAL PS-2 SXMP1B118

## (undated) DEVICE — NEEDLE HYPO 18X1-1/2 SAFETY 305918

## (undated) DEVICE — SUTURE VICRYL+ 2-0 27IN SH UND VCP417H

## (undated) RX ORDER — PROPOFOL 10 MG/ML
INJECTION, EMULSION INTRAVENOUS
Status: DISPENSED
Start: 2024-07-05

## (undated) RX ORDER — PROPOFOL 10 MG/ML
INJECTION, EMULSION INTRAVENOUS
Status: DISPENSED
Start: 2024-05-28

## (undated) RX ORDER — ONDANSETRON 2 MG/ML
INJECTION INTRAMUSCULAR; INTRAVENOUS
Status: DISPENSED
Start: 2024-07-05

## (undated) RX ORDER — FENTANYL CITRATE 50 UG/ML
INJECTION, SOLUTION INTRAMUSCULAR; INTRAVENOUS
Status: DISPENSED
Start: 2024-05-13

## (undated) RX ORDER — DEXAMETHASONE SODIUM PHOSPHATE 10 MG/ML
INJECTION, SOLUTION INTRAMUSCULAR; INTRAVENOUS
Status: DISPENSED
Start: 2024-05-28

## (undated) RX ORDER — ONDANSETRON 2 MG/ML
INJECTION INTRAMUSCULAR; INTRAVENOUS
Status: DISPENSED
Start: 2024-05-13

## (undated) RX ORDER — CEFAZOLIN SODIUM 1 G/3ML
INJECTION, POWDER, FOR SOLUTION INTRAMUSCULAR; INTRAVENOUS
Status: DISPENSED
Start: 2024-05-13

## (undated) RX ORDER — PROPOFOL 10 MG/ML
INJECTION, EMULSION INTRAVENOUS
Status: DISPENSED
Start: 2024-05-13

## (undated) RX ORDER — GINSENG 100 MG
CAPSULE ORAL
Status: DISPENSED
Start: 2024-07-05

## (undated) RX ORDER — LIDOCAINE HYDROCHLORIDE 10 MG/ML
INJECTION, SOLUTION EPIDURAL; INFILTRATION; INTRACAUDAL; PERINEURAL
Status: DISPENSED
Start: 2024-07-05

## (undated) RX ORDER — KETOROLAC TROMETHAMINE 30 MG/ML
INJECTION, SOLUTION INTRAMUSCULAR; INTRAVENOUS
Status: DISPENSED
Start: 2024-05-13

## (undated) RX ORDER — LIDOCAINE HYDROCHLORIDE 10 MG/ML
INJECTION, SOLUTION EPIDURAL; INFILTRATION; INTRACAUDAL; PERINEURAL
Status: DISPENSED
Start: 2024-05-13

## (undated) RX ORDER — GINSENG 100 MG
CAPSULE ORAL
Status: DISPENSED
Start: 2024-05-13

## (undated) RX ORDER — DEXAMETHASONE SODIUM PHOSPHATE 10 MG/ML
INJECTION, SOLUTION INTRAMUSCULAR; INTRAVENOUS
Status: DISPENSED
Start: 2024-07-05

## (undated) RX ORDER — DEXAMETHASONE SODIUM PHOSPHATE 10 MG/ML
INJECTION, SOLUTION INTRAMUSCULAR; INTRAVENOUS
Status: DISPENSED
Start: 2024-05-13

## (undated) RX ORDER — FENTANYL CITRATE 50 UG/ML
INJECTION, SOLUTION INTRAMUSCULAR; INTRAVENOUS
Status: DISPENSED
Start: 2024-07-05

## (undated) RX ORDER — LIDOCAINE HYDROCHLORIDE 10 MG/ML
INJECTION, SOLUTION EPIDURAL; INFILTRATION; INTRACAUDAL; PERINEURAL
Status: DISPENSED
Start: 2024-05-28

## (undated) RX ORDER — GINSENG 100 MG
CAPSULE ORAL
Status: DISPENSED
Start: 2024-05-28

## (undated) RX ORDER — ONDANSETRON 2 MG/ML
INJECTION INTRAMUSCULAR; INTRAVENOUS
Status: DISPENSED
Start: 2024-05-28

## (undated) RX ORDER — FENTANYL CITRATE 50 UG/ML
INJECTION, SOLUTION INTRAMUSCULAR; INTRAVENOUS
Status: DISPENSED
Start: 2024-05-28